# Patient Record
Sex: FEMALE | Race: WHITE | NOT HISPANIC OR LATINO | Employment: OTHER | URBAN - METROPOLITAN AREA
[De-identification: names, ages, dates, MRNs, and addresses within clinical notes are randomized per-mention and may not be internally consistent; named-entity substitution may affect disease eponyms.]

---

## 2019-04-12 DIAGNOSIS — R52 PAIN: ICD-10-CM

## 2019-04-12 LAB
ANION GAP SERPL CALC-SCNC: 11 MEQ/L (ref 3–15)
BUN SERPL-MCNC: 19 MG/DL (ref 8–20)
CALCIUM SERPL-MCNC: 8.9 MG/DL (ref 8.9–10.3)
CHLORIDE SERPL-SCNC: 101 MEQ/L (ref 98–109)
CO2 SERPL-SCNC: 24 MEQ/L (ref 22–32)
CREAT SERPL-MCNC: 0.9 MG/DL
ERYTHROCYTE [DISTWIDTH] IN BLOOD BY AUTOMATED COUNT: 14.7 % (ref 11.7–14.4)
GFR SERPL CREATININE-BSD FRML MDRD: 59.2 ML/MIN/1.73M*2
GLUCOSE SERPL-MCNC: 89 MG/DL (ref 70–99)
HCT VFR BLDCO AUTO: 36.7 %
HGB BLD-MCNC: 11.7 G/DL
MCH RBC QN AUTO: 29.6 PG (ref 28–33.2)
MCHC RBC AUTO-ENTMCNC: 31.9 G/DL (ref 32.2–35.5)
MCV RBC AUTO: 92.9 FL (ref 83–98)
PDW BLD AUTO: 9.7 FL (ref 9.4–12.3)
PLATELET # BLD AUTO: 389 K/UL
POTASSIUM SERPL-SCNC: 4.8 MEQ/L (ref 3.6–5.1)
RBC # BLD AUTO: 3.95 M/UL (ref 3.93–5.22)
SODIUM SERPL-SCNC: 136 MEQ/L (ref 136–144)
WBC # BLD AUTO: 7.13 K/UL

## 2019-04-12 PROCEDURE — 85027 COMPLETE CBC AUTOMATED: CPT | Performed by: INTERNAL MEDICINE

## 2019-04-12 PROCEDURE — 80048 BASIC METABOLIC PNL TOTAL CA: CPT | Performed by: INTERNAL MEDICINE

## 2019-04-12 PROCEDURE — 36415 COLL VENOUS BLD VENIPUNCTURE: CPT

## 2019-04-18 DIAGNOSIS — I10 ESSENTIAL (PRIMARY) HYPERTENSION: ICD-10-CM

## 2019-04-18 DIAGNOSIS — R35.0 URINARY FREQUENCY: ICD-10-CM

## 2019-04-18 LAB
ANION GAP SERPL CALC-SCNC: 10 MEQ/L (ref 3–15)
BACTERIA URNS QL MICRO: ABNORMAL /HPF
BILIRUB UR QL STRIP.AUTO: NEGATIVE MG/DL
BUN SERPL-MCNC: 20 MG/DL (ref 8–20)
CALCIUM SERPL-MCNC: 8.9 MG/DL (ref 8.9–10.3)
CAOX CRY URNS QL MICRO: 2 /HPF
CHLORIDE SERPL-SCNC: 103 MEQ/L (ref 98–109)
CLARITY UR REFRACT.AUTO: ABNORMAL
CO2 SERPL-SCNC: 23 MEQ/L (ref 22–32)
COLOR UR AUTO: YELLOW
CREAT SERPL-MCNC: 0.8 MG/DL
ERYTHROCYTE [DISTWIDTH] IN BLOOD BY AUTOMATED COUNT: 14.6 % (ref 11.7–14.4)
GFR SERPL CREATININE-BSD FRML MDRD: >60 ML/MIN/1.73M*2
GLUCOSE SERPL-MCNC: 92 MG/DL (ref 70–99)
GLUCOSE UR STRIP.AUTO-MCNC: NEGATIVE MG/DL
HCT VFR BLDCO AUTO: 37.1 %
HGB BLD-MCNC: 12.4 G/DL
HGB UR QL STRIP.AUTO: 3
HYALINE CASTS #/AREA URNS LPF: ABNORMAL /LPF
KETONES UR STRIP.AUTO-MCNC: NEGATIVE MG/DL
LEUKOCYTE ESTERASE UR QL STRIP.AUTO: 2
MCH RBC QN AUTO: 29.8 PG (ref 28–33.2)
MCHC RBC AUTO-ENTMCNC: 33.4 G/DL (ref 32.2–35.5)
MCV RBC AUTO: 89.2 FL (ref 83–98)
MUCOUS THREADS URNS QL MICRO: 2 /LPF
NITRITE UR QL STRIP.AUTO: NEGATIVE
PDW BLD AUTO: 10.1 FL (ref 9.4–12.3)
PH UR STRIP.AUTO: 6 [PH]
PLATELET # BLD AUTO: 370 K/UL
POTASSIUM SERPL-SCNC: 4 MEQ/L (ref 3.6–5.1)
PROT UR QL STRIP.AUTO: 1
RBC # BLD AUTO: 4.16 M/UL (ref 3.93–5.22)
RBC #/AREA URNS HPF: ABNORMAL /HPF
SODIUM SERPL-SCNC: 136 MEQ/L (ref 136–144)
SP GR UR REFRACT.AUTO: 1.02
SQUAMOUS URNS QL MICRO: ABNORMAL /HPF
UROBILINOGEN UR STRIP-ACNC: 1 EU/DL
WBC # BLD AUTO: 6.07 K/UL
WBC #/AREA URNS HPF: ABNORMAL /HPF

## 2019-04-18 PROCEDURE — 85027 COMPLETE CBC AUTOMATED: CPT | Performed by: INTERNAL MEDICINE

## 2019-04-18 PROCEDURE — 36415 COLL VENOUS BLD VENIPUNCTURE: CPT | Performed by: INTERNAL MEDICINE

## 2019-04-18 PROCEDURE — 80048 BASIC METABOLIC PNL TOTAL CA: CPT | Mod: 59 | Performed by: INTERNAL MEDICINE

## 2019-04-18 PROCEDURE — 81003 URINALYSIS AUTO W/O SCOPE: CPT | Performed by: INTERNAL MEDICINE

## 2019-04-18 PROCEDURE — 87086 URINE CULTURE/COLONY COUNT: CPT | Performed by: INTERNAL MEDICINE

## 2021-04-14 DIAGNOSIS — Z23 ENCOUNTER FOR IMMUNIZATION: ICD-10-CM

## 2024-02-29 ENCOUNTER — APPOINTMENT (EMERGENCY)
Dept: RADIOLOGY | Facility: HOSPITAL | Age: 89
DRG: 536 | End: 2024-02-29
Payer: COMMERCIAL

## 2024-02-29 ENCOUNTER — APPOINTMENT (OUTPATIENT)
Dept: RADIOLOGY | Facility: HOSPITAL | Age: 89
Setting detail: OBSERVATION
DRG: 536 | End: 2024-02-29
Attending: SPEECH-LANGUAGE PATHOLOGIST
Payer: COMMERCIAL

## 2024-02-29 ENCOUNTER — HOSPITAL ENCOUNTER (INPATIENT)
Facility: HOSPITAL | Age: 89
LOS: 3 days | Discharge: NURSING, REHAB OR LONG-TERM CARE | DRG: 536 | End: 2024-03-04
Attending: EMERGENCY MEDICINE | Admitting: STUDENT IN AN ORGANIZED HEALTH CARE EDUCATION/TRAINING PROGRAM
Payer: COMMERCIAL

## 2024-02-29 DIAGNOSIS — R55 SYNCOPE AND COLLAPSE: Primary | ICD-10-CM
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
## 2024-02-29 DIAGNOSIS — S32.591A CLOSED FRACTURE OF RAMUS OF RIGHT PUBIS, INITIAL ENCOUNTER (CMS/HCC): ICD-10-CM

## 2024-02-29 PROBLEM — E03.9 HYPOTHYROID: Status: ACTIVE | Noted: 2024-02-29

## 2024-02-29 PROBLEM — S32.511A: Status: ACTIVE | Noted: 2024-02-29

## 2024-02-29 PROBLEM — D72.829 LEUKOCYTOSIS: Status: ACTIVE | Noted: 2024-02-29

## 2024-02-29 PROBLEM — I10 HTN (HYPERTENSION): Status: ACTIVE | Noted: 2024-02-29

## 2024-02-29 LAB
ALBUMIN SERPL-MCNC: 4.2 G/DL (ref 3.5–5.7)
ALP SERPL-CCNC: 77 IU/L (ref 34–125)
ALT SERPL-CCNC: 14 IU/L (ref 7–52)
ANION GAP SERPL CALC-SCNC: 10 MEQ/L (ref 3–15)
AST SERPL-CCNC: 16 IU/L (ref 13–39)
BACTERIA URNS QL MICRO: ABNORMAL /HPF
BASOPHILS # BLD: 0.05 K/UL (ref 0.01–0.1)
BASOPHILS NFR BLD: 0.4 %
BILIRUB SERPL-MCNC: 1.1 MG/DL (ref 0.3–1.2)
BILIRUB UR QL STRIP.AUTO: NEGATIVE MG/DL
BUN SERPL-MCNC: 27 MG/DL (ref 7–25)
CALCIUM SERPL-MCNC: 9.3 MG/DL (ref 8.6–10.3)
CHLORIDE SERPL-SCNC: 103 MEQ/L (ref 98–107)
CLARITY UR REFRACT.AUTO: CLEAR
CO2 SERPL-SCNC: 26 MEQ/L (ref 21–31)
COLOR UR AUTO: YELLOW
CREAT SERPL-MCNC: 0.8 MG/DL (ref 0.6–1.2)
DIFFERENTIAL METHOD BLD: ABNORMAL
EGFRCR SERPLBLD CKD-EPI 2021: >60 ML/MIN/1.73M*2
EOSINOPHIL # BLD: 0.09 K/UL (ref 0.04–0.36)
EOSINOPHIL NFR BLD: 0.8 %
ERYTHROCYTE [DISTWIDTH] IN BLOOD BY AUTOMATED COUNT: 14.1 % (ref 11.7–14.4)
GLUCOSE SERPL-MCNC: 111 MG/DL (ref 70–99)
GLUCOSE UR STRIP.AUTO-MCNC: NEGATIVE MG/DL
HCT VFR BLD AUTO: 42.4 % (ref 35–45)
HGB BLD-MCNC: 14 G/DL (ref 11.8–15.7)
HGB UR QL STRIP.AUTO: NEGATIVE
HYALINE CASTS #/AREA URNS LPF: ABNORMAL /LPF
IMM GRANULOCYTES # BLD AUTO: 0.11 K/UL (ref 0–0.08)
IMM GRANULOCYTES NFR BLD AUTO: 0.9 %
INR PPP: 0.9
KETONES UR STRIP.AUTO-MCNC: NEGATIVE MG/DL
LEUKOCYTE ESTERASE UR QL STRIP.AUTO: NEGATIVE
LYMPHOCYTES # BLD: 1.07 K/UL (ref 1.2–3.5)
LYMPHOCYTES NFR BLD: 9 %
MCH RBC QN AUTO: 28.7 PG (ref 28–33.2)
MCHC RBC AUTO-ENTMCNC: 33 G/DL (ref 32.2–35.5)
MCV RBC AUTO: 87.1 FL (ref 83–98)
MONOCYTES # BLD: 0.49 K/UL (ref 0.28–0.8)
MONOCYTES NFR BLD: 4.1 %
MUCOUS THREADS URNS QL MICRO: ABNORMAL /LPF
NEUTROPHILS # BLD: 10.02 K/UL (ref 1.7–7)
NEUTS SEG NFR BLD: 84.8 %
NITRITE UR QL STRIP.AUTO: NEGATIVE
NRBC BLD-RTO: 0 %
PDW BLD AUTO: 10.1 FL (ref 9.4–12.3)
PH UR STRIP.AUTO: 7 [PH]
PLATELET # BLD AUTO: 321 K/UL (ref 150–369)
POTASSIUM SERPL-SCNC: 3.9 MEQ/L (ref 3.5–5.1)
PROT SERPL-MCNC: 7.3 G/DL (ref 6–8.2)
PROT UR QL STRIP.AUTO: ABNORMAL
PROTHROMBIN TIME: 12.1 SEC (ref 12.2–14.5)
RBC # BLD AUTO: 4.87 M/UL (ref 3.93–5.22)
RBC #/AREA URNS HPF: ABNORMAL /HPF
SODIUM SERPL-SCNC: 139 MEQ/L (ref 136–145)
SP GR UR REFRACT.AUTO: 1.02
SQUAMOUS URNS QL MICRO: ABNORMAL /HPF
TROPONIN I SERPL HS-MCNC: 10.7 PG/ML
TROPONIN I SERPL HS-MCNC: 13.7 PG/ML
UROBILINOGEN UR STRIP-ACNC: 0.2 EU/DL
WBC # BLD AUTO: 11.83 K/UL (ref 3.8–10.5)
WBC #/AREA URNS HPF: ABNORMAL /HPF

## 2024-02-29 PROCEDURE — 80053 COMPREHEN METABOLIC PANEL: CPT

## 2024-02-29 PROCEDURE — 71275 CT ANGIOGRAPHY CHEST: CPT | Mod: ME

## 2024-02-29 PROCEDURE — 85025 COMPLETE CBC W/AUTO DIFF WBC: CPT

## 2024-02-29 PROCEDURE — 73502 X-RAY EXAM HIP UNI 2-3 VIEWS: CPT | Mod: RT

## 2024-02-29 PROCEDURE — 36415 COLL VENOUS BLD VENIPUNCTURE: CPT | Performed by: EMERGENCY MEDICINE

## 2024-02-29 PROCEDURE — 63600105 HC IODINE BASED CONTRAST: Mod: JZ | Performed by: SPEECH-LANGUAGE PATHOLOGIST

## 2024-02-29 PROCEDURE — 93005 ELECTROCARDIOGRAM TRACING: CPT

## 2024-02-29 PROCEDURE — G1004 CDSM NDSC: HCPCS

## 2024-02-29 PROCEDURE — 72125 CT NECK SPINE W/O DYE: CPT

## 2024-02-29 PROCEDURE — 85610 PROTHROMBIN TIME: CPT

## 2024-02-29 PROCEDURE — 25800000 HC PHARMACY IV SOLUTIONS

## 2024-02-29 PROCEDURE — G0378 HOSPITAL OBSERVATION PER HR: HCPCS

## 2024-02-29 PROCEDURE — 81001 URINALYSIS AUTO W/SCOPE: CPT

## 2024-02-29 PROCEDURE — 71045 X-RAY EXAM CHEST 1 VIEW: CPT

## 2024-02-29 PROCEDURE — 96361 HYDRATE IV INFUSION ADD-ON: CPT

## 2024-02-29 PROCEDURE — 84484 ASSAY OF TROPONIN QUANT: CPT | Mod: 91

## 2024-02-29 PROCEDURE — 99285 EMERGENCY DEPT VISIT HI MDM: CPT | Mod: 25

## 2024-02-29 PROCEDURE — 72192 CT PELVIS W/O DYE: CPT

## 2024-02-29 PROCEDURE — 70450 CT HEAD/BRAIN W/O DYE: CPT

## 2024-02-29 PROCEDURE — 63600000 HC DRUGS/DETAIL CODE: Mod: JZ

## 2024-02-29 PROCEDURE — 96360 HYDRATION IV INFUSION INIT: CPT | Mod: 59

## 2024-02-29 PROCEDURE — 84484 ASSAY OF TROPONIN QUANT: CPT

## 2024-02-29 PROCEDURE — 99223 1ST HOSP IP/OBS HIGH 75: CPT | Performed by: HOSPITALIST

## 2024-02-29 RX ORDER — AMLODIPINE BESYLATE 2.5 MG/1
2.5 TABLET ORAL DAILY
COMMUNITY
End: 2024-02-29

## 2024-02-29 RX ORDER — POTASSIUM CHLORIDE 750 MG/1
40 TABLET, EXTENDED RELEASE ORAL AS NEEDED
Status: DISCONTINUED | OUTPATIENT
Start: 2024-02-29 | End: 2024-03-04 | Stop reason: HOSPADM

## 2024-02-29 RX ORDER — POTASSIUM CHLORIDE 750 MG/1
20 TABLET, EXTENDED RELEASE ORAL AS NEEDED
Status: DISCONTINUED | OUTPATIENT
Start: 2024-02-29 | End: 2024-02-29

## 2024-02-29 RX ORDER — AMLODIPINE BESYLATE 2.5 MG/1
2.5 TABLET ORAL DAILY
Status: DISCONTINUED | OUTPATIENT
Start: 2024-03-01 | End: 2024-03-04 | Stop reason: HOSPADM

## 2024-02-29 RX ORDER — DEXTROSE 50 % IN WATER (D50W) INTRAVENOUS SYRINGE
25 AS NEEDED
Status: DISCONTINUED | OUTPATIENT
Start: 2024-02-29 | End: 2024-03-04 | Stop reason: HOSPADM

## 2024-02-29 RX ORDER — ONDANSETRON HYDROCHLORIDE 2 MG/ML
4 INJECTION, SOLUTION INTRAVENOUS ONCE
Status: COMPLETED | OUTPATIENT
Start: 2024-02-29 | End: 2024-02-29

## 2024-02-29 RX ORDER — DEXTROSE 40 %
15-30 GEL (GRAM) ORAL AS NEEDED
Status: DISCONTINUED | OUTPATIENT
Start: 2024-02-29 | End: 2024-03-04 | Stop reason: HOSPADM

## 2024-02-29 RX ORDER — MORPHINE SULFATE 2 MG/ML
2 INJECTION, SOLUTION INTRAMUSCULAR; INTRAVENOUS EVERY 4 HOURS PRN
Status: DISCONTINUED | OUTPATIENT
Start: 2024-02-29 | End: 2024-02-29

## 2024-02-29 RX ORDER — PRAVASTATIN SODIUM 20 MG/1
20 TABLET ORAL NIGHTLY
Status: DISCONTINUED | OUTPATIENT
Start: 2024-02-29 | End: 2024-03-04 | Stop reason: HOSPADM

## 2024-02-29 RX ORDER — PRAVASTATIN SODIUM 20 MG/1
20 TABLET ORAL NIGHTLY
COMMUNITY

## 2024-02-29 RX ORDER — POTASSIUM CHLORIDE 14.9 MG/ML
20 INJECTION INTRAVENOUS AS NEEDED
Status: DISCONTINUED | OUTPATIENT
Start: 2024-02-29 | End: 2024-03-04 | Stop reason: HOSPADM

## 2024-02-29 RX ORDER — MORPHINE SULFATE 2 MG/ML
2 INJECTION, SOLUTION INTRAMUSCULAR; INTRAVENOUS
Status: DISCONTINUED | OUTPATIENT
Start: 2024-02-29 | End: 2024-02-29

## 2024-02-29 RX ORDER — LEVOTHYROXINE SODIUM 88 UG/1
88 TABLET ORAL
Status: DISCONTINUED | OUTPATIENT
Start: 2024-03-01 | End: 2024-03-04 | Stop reason: HOSPADM

## 2024-02-29 RX ORDER — LIDOCAINE 560 MG/1
1 PATCH PERCUTANEOUS; TOPICAL; TRANSDERMAL DAILY
Status: DISCONTINUED | OUTPATIENT
Start: 2024-02-29 | End: 2024-03-04 | Stop reason: HOSPADM

## 2024-02-29 RX ORDER — AMOXICILLIN 250 MG
2 CAPSULE ORAL DAILY PRN
COMMUNITY

## 2024-02-29 RX ORDER — AMOXICILLIN 250 MG
2 CAPSULE ORAL DAILY PRN
Status: DISCONTINUED | OUTPATIENT
Start: 2024-02-29 | End: 2024-03-04 | Stop reason: HOSPADM

## 2024-02-29 RX ORDER — IOPAMIDOL 755 MG/ML
100 INJECTION, SOLUTION INTRAVASCULAR
Status: COMPLETED | OUTPATIENT
Start: 2024-02-29 | End: 2024-02-29

## 2024-02-29 RX ORDER — AMLODIPINE BESYLATE 2.5 MG/1
2.5 TABLET ORAL DAILY
COMMUNITY
End: 2024-10-09 | Stop reason: ENTERED-IN-ERROR

## 2024-02-29 RX ORDER — BETHANECHOL CHLORIDE 10 MG/1
10 TABLET ORAL 3 TIMES DAILY
COMMUNITY
End: 2024-10-16 | Stop reason: HOSPADM

## 2024-02-29 RX ORDER — LEVOTHYROXINE SODIUM 88 UG/1
88 TABLET ORAL
COMMUNITY

## 2024-02-29 RX ORDER — MORPHINE SULFATE 4 MG/ML
4 INJECTION, SOLUTION INTRAMUSCULAR; INTRAVENOUS ONCE
Status: COMPLETED | OUTPATIENT
Start: 2024-02-29 | End: 2024-02-29

## 2024-02-29 RX ORDER — FLUTICASONE PROPIONATE 50 MCG
1 SPRAY, SUSPENSION (ML) NASAL DAILY
COMMUNITY

## 2024-02-29 RX ORDER — ACETAMINOPHEN 325 MG/1
650 TABLET ORAL EVERY 6 HOURS
Status: DISCONTINUED | OUTPATIENT
Start: 2024-02-29 | End: 2024-03-04 | Stop reason: HOSPADM

## 2024-02-29 RX ORDER — BETHANECHOL CHLORIDE 10 MG/1
10 TABLET ORAL 3 TIMES DAILY
Status: DISCONTINUED | OUTPATIENT
Start: 2024-02-29 | End: 2024-03-04 | Stop reason: HOSPADM

## 2024-02-29 RX ORDER — MORPHINE SULFATE 4 MG/ML
4 INJECTION, SOLUTION INTRAMUSCULAR; INTRAVENOUS EVERY 4 HOURS PRN
Status: DISCONTINUED | OUTPATIENT
Start: 2024-02-29 | End: 2024-03-01

## 2024-02-29 RX ORDER — IBUPROFEN 200 MG
16-32 TABLET ORAL AS NEEDED
Status: DISCONTINUED | OUTPATIENT
Start: 2024-02-29 | End: 2024-03-04 | Stop reason: HOSPADM

## 2024-02-29 RX ORDER — ACETAMINOPHEN 325 MG/1
650 TABLET ORAL EVERY 6 HOURS
Status: DISCONTINUED | OUTPATIENT
Start: 2024-03-01 | End: 2024-02-29

## 2024-02-29 RX ORDER — DIAZEPAM 2 MG/1
5 TABLET ORAL EVERY 6 HOURS PRN
Status: DISCONTINUED | OUTPATIENT
Start: 2024-02-29 | End: 2024-03-04 | Stop reason: HOSPADM

## 2024-02-29 RX ORDER — NITROGLYCERIN 0.4 MG/1
0.4 TABLET SUBLINGUAL EVERY 5 MIN PRN
Status: DISCONTINUED | OUTPATIENT
Start: 2024-02-29 | End: 2024-03-04 | Stop reason: HOSPADM

## 2024-02-29 RX ADMIN — MORPHINE SULFATE 4 MG: 4 INJECTION, SOLUTION INTRAMUSCULAR; INTRAVENOUS at 16:47

## 2024-02-29 RX ADMIN — ONDANSETRON 4 MG: 2 INJECTION INTRAMUSCULAR; INTRAVENOUS at 16:47

## 2024-02-29 RX ADMIN — IOPAMIDOL 100 ML: 755 INJECTION, SOLUTION INTRAVENOUS at 21:03

## 2024-02-29 RX ADMIN — SODIUM CHLORIDE 500 ML: 9 INJECTION, SOLUTION INTRAVENOUS at 16:08

## 2024-02-29 NOTE — ED ATTESTATION NOTE
I have personally seen and examined the patient.  I have performed the key components of the encounter and provided a substantive portion of the care and medical decision making for the patient.     I reviewed and agree with resident / physician assistant / nurse practitioner’s assessment and plan of care, with any exceptions as documented below.     My focused history, examination, assessment, and plan of care of  Sofie Stewart is as follows:       Vital Signs Review: Vital signs have been reviewed. The oxygen saturation is  SpO2: 97 % which is normal.    The patient is a 89-year-old female with past medical history of ambulatory dysfunction, hard of hearing, who presented to the emergency department for evaluation of fall.  The patient was reported to have a syncopal episode today falling striking the left side of head.  The patient complains of left hip pain.  The patient normally uses a cane to ambulate.  Patient denies neck pain, chest pain, dyspnea, nausea, vomiting, new focal neurologic weakness or numbness.  Patient was unable to bear weight due to pain in the left hip.    Physical exam: Vital signs reviewed.  General: Patient appears comfortable sitting up in bed.  HEENT: Approximately 5 mm superficial laceration to the left frontal scalp.  No active bleeding.  No foreign body seen.  PERRLA, EOMI, MMM.  Neck: C-collar in place.  No focal bony tenderness.  Chest: Lungs clear to auscultation bilaterally, no rales.  Heart: Regular rate and rhythm, no murmurs.  Abdomen: Soft, nontender, no guarding, no rebound.  Extremities: No cyanosis, clubbing, edema, or deformity .  The patient has moderate tenderness to palpation over the lateral aspect of the right hip.  No palpable hematoma.  Pelvis is stable.  Patient is severe limitation range of motion of the right lower extremity due to pain at the right hip.  Patient neurovascular intact distally in the right lower extremity with 2+ DP pulses present.  Skin: Warm and  dry, no rash.  Neuro: GCS 15.  Affect: Normal.    Plan: Check labs, x-rays.  Rule out traumatic injuries.  CT scan rule out subdural.      Labs Reviewed   CBC AND DIFF - Abnormal       Result Value    WBC 11.83 (*)     RBC 4.87      Hemoglobin 14.0      Hematocrit 42.4      MCV 87.1      MCH 28.7      MCHC 33.0      RDW 14.1      Platelets 321      MPV 10.1      Differential Type Auto      nRBC 0.0      Immature Granulocytes 0.9      Neutrophils 84.8      Lymphocytes 9.0      Monocytes 4.1      Eosinophils 0.8      Basophils 0.4      Immature Granulocytes, Absolute 0.11 (*)     Neutrophils, Absolute 10.02 (*)     Lymphocytes, Absolute 1.07 (*)     Monocytes, Absolute 0.49      Eosinophils, Absolute 0.09      Basophils, Absolute 0.05     COMPREHENSIVE METABOLIC PANEL - Abnormal    Sodium 139      Potassium 3.9      Chloride 103      CO2 26      BUN 27 (*)     Creatinine 0.8      Glucose 111 (*)     Calcium 9.3      AST (SGOT) 16      ALT (SGPT) 14      Alkaline Phosphatase 77      Total Protein 7.3      Albumin 4.2      Bilirubin, Total 1.1      eGFR >60.0      Anion Gap 10     PROTIME-INR - Abnormal    PT 12.1 (*)     INR 0.9     HIGH SENSITIVE TROPONIN I (BASELINE - REFLEX 2HR) - Normal    High Sens Troponin I 10.7     URINALYSIS REFLEX CULTURE (ED AND OUTPATIENT ONLY)    Narrative:     The following orders were created for panel order UA w/ reflex culture (ED Only).  Procedure                               Abnormality         Status                     ---------                               -----------         ------                     UA Reflex to Culture (Ma...[[id number]]                                                   Please view results for these tests on the individual orders.   UA REFLEX CULTURE (MACROSCOPIC)   RAINBOW DRAW PANEL    Narrative:     The following orders were created for panel order [place] Draw Panel.  Procedure                               Abnormality         Status                      ---------                               -----------         ------                     RAINBOW RED[[id number]]                                      In process                 RAINBOW PINK[[id number]]                                     In process                 RAINBOW GOLD[[id number]]                                     In process                   Please view results for these tests on the individual orders.   HIGH SENSITIVE TROPONIN I (NO REFLEX)   RAINBOW RED   RAINBOW PINK   RAINBOW GOLD     X-RAY CHEST 1 VIEW   Final Result   IMPRESSION:         1. 9 mm nodular density right midlung.   2. Patchy opacities in the right upper lung which may be due to infection.   2. Further evaluation with CT chest is recommended.      X-RAY HIP WITH OR WITHOUT PELVIS 2-3 VW RIGHT   Final Result   IMPRESSION:   Possible nondisplaced fracture right superior pubic ramus. This could be better   evaluated with CT.               CT HEAD WITHOUT IV CONTRAST   Final Result   IMPRESSION:      1. No posttraumatic intracranial abnormality.   2. No acute cervical spine fractures.  As clinically indicated, MRI of the   cervical spine is recommended for further evaluation.   3. Other incidental findings noted under the comment.         CT CERVICAL SPINE WITHOUT IV CONTRAST   Final Result   IMPRESSION:      1. No posttraumatic intracranial abnormality.   2. No acute cervical spine fractures.  As clinically indicated, MRI of the   cervical spine is recommended for further evaluation.   3. Other incidental findings noted under the comment.         ECG 12 lead    (Results Pending)   CT PELVIS WITHOUT IV CONTRAST    (Results Pending)     ECG 12 lead   ED Interpretation   Rhythm: Normal sinus rhythm with PVCs  Rate: 99  P waves: 154 ms  QRS: 74 ms  Axis: 61 16 67  ST Segments: [no obvious ST elevation or ischemia]     Nonspecific T wave abnormality, abnormal EKG, no previous EKG for comparison.        CT PELVIS WITHOUT IV CONTRAST   Final Result    IMPRESSION:      Acute nondisplaced right superior pubic ramus fracture with associated right   pelvic sidewall/extraperitoneal hemorrhage.      X-RAY CHEST 1 VIEW   Final Result   IMPRESSION:         1. 9 mm nodular density right midlung.   2. Patchy opacities in the right upper lung which may be due to infection.   2. Further evaluation with CT chest is recommended.      X-RAY HIP WITH OR WITHOUT PELVIS 2-3 VW RIGHT   Final Result   IMPRESSION:   Possible nondisplaced fracture right superior pubic ramus. This could be better   evaluated with CT.               CT HEAD WITHOUT IV CONTRAST   Final Result   IMPRESSION:      1. No posttraumatic intracranial abnormality.   2. No acute cervical spine fractures.  As clinically indicated, MRI of the   cervical spine is recommended for further evaluation.   3. Other incidental findings noted under the comment.         CT CERVICAL SPINE WITHOUT IV CONTRAST   Final Result   IMPRESSION:      1. No posttraumatic intracranial abnormality.   2. No acute cervical spine fractures.  As clinically indicated, MRI of the   cervical spine is recommended for further evaluation.   3. Other incidental findings noted under the comment.         CT ANGIOGRAPHY CHEST PULMONARY EMBOLISM WITH IV CONTRAST    (Results Pending)   Transthoracic echo (TTE) complete    (Results Pending)         MDM: Patient test results reviewed.  Patient has persistent pain with any range of motion testing of the right lower extremity.  Plan hospitalize patient.  Acute syncopal episode.  CT scan results pending of the right hip and pelvis.    The patient CT scan results of the pelvis are reviewed.  The patient is hemodynamically stable.  No hypotension.  No hypoxia.  HMS to admit patient.    Impression: Acute fall.  Acute right hip pain.  Acute syncopal episode.  Acute right superior pubic ramus fracture.     I was physically present for the key/critical portions of the following procedures: None    This document  was created using Dragon dictation software.  There may be some typographical errors due to this technology.     Iván Lazo MD  02/29/24 1320

## 2024-02-29 NOTE — ED PROVIDER NOTES
HPI    HISTORY OF PRESENT ILLNESS     HPI     89-year-old female with past medical history significant for hypertension, hyponatremia, arrives to the emergency department via EMS from the [facility] after sustaining a fall while ambulating with her walker in the facility.  Patient is awake alert and answering questions appropriately, and is hard of hearing at baseline.  Patient denies mechanical fall, endorses syncope.  Patient denies dizziness preceding syncopal event, states she was ambulating with her walker and then she remembers waking up on the floor with staff at her side.  Patient is endorsing left hip pain.  Small abrasion is noted to left side of her forehead, no repairable injury.  Patient rates her pain 5 out of 10, with external rotation and shortening noted.  Neurovascularly intact.  Denies headache, dizziness, fevers, recent illness, chest pain, shortness of breath, abdominal pain, nausea, vomiting, diarrhea, constipation, numbness, tingling.      Patient History   PAST HISTORY     Reviewed from Nursing Triage:       No past medical history on file.    No past surgical history on file.    No family history on file.           Review of Systems   REVIEW OF SYSTEMS     Review of Systems      VITALS     ED Vitals    Date/Time Temp Pulse Resp BP SpO2 [facility]   02/29/24 1408 36.4 °C (97.6 °F) 73 16 176/79 97 % FFS                       Physical Exam   PHYSICAL EXAM     Physical Exam  Constitutional:       Appearance: Normal appearance. She is normal weight. She is not ill-appearing or toxic-appearing.   HENT:      Head: Normocephalic.   Eyes:      Extraocular Movements: Extraocular movements intact.      Pupils: Pupils are equal, round, and reactive to light.   Cardiovascular:      Rate and Rhythm: Normal rate and regular rhythm.      Pulses: Normal pulses.      Heart sounds: Normal heart sounds.      No S3 or S4 sounds.   Pulmonary:      Effort: Pulmonary effort is normal.       Breath sounds: Examination of the right-upper field reveals decreased breath sounds. Examination of the left-upper field reveals decreased breath sounds. Decreased breath sounds present.   Abdominal:      Palpations: Abdomen is soft.      Tenderness: There is no abdominal tenderness. There is no guarding.   Musculoskeletal:      Cervical back: Normal range of motion and neck supple. No rigidity or tenderness.      Right hip: Tenderness and bony tenderness present. No deformity. Decreased range of motion. Decreased strength.      Right lower leg: No edema.      Left lower leg: No edema.   Skin:     General: Skin is warm and dry.      Capillary Refill: Capillary refill takes less than 2 seconds.   Neurological:      General: No focal deficit present.      Mental Status: She is alert and oriented to person, place, and time.           PROCEDURES     Procedures     DATA     Results     None          Imaging Results          CT HEAD WITHOUT IV CONTRAST (In process)                CT CERVICAL SPINE WITHOUT IV CONTRAST (In process)                 ECG 12 lead    (Results Pending)       Scoring tools                                  ED Course & MDM   MDM / ED COURSE / CLINICAL IMPRESSION / DISPO   Vital Signs Review: Vital signs have been reviewed.   The oxygen saturation is SpO2: 97 % which is normal.    Medical Decision Making  Chief complaint: Syncope, right hip pain.    Impression: 89-year-old nontoxic appearing female patient presents via EMS with symptoms consistent with syncope, with unknown etiology.  Presentation not consistent with seizures given short time course, no postictal state, no seizure activity or history of seizure.  Low suspicion for PE, thoracic aortic dissection, AAA rupture.  Presentation not consistent with acute life-threatening arrhythmia, EKG reassuring, heart score 4.  Right lower extremity is notably shortened with external rotation secondary to fall.  Given age, cardiovascular risk  factors, history and physical, will workup and admit to hospitalist service.    Plan: CBC, CMP, troponin, EKG, chest x-ray, CT head and C-spine, plain films pelvis and right hip, IV fluids, pain management, cardiac monitoring    Dispo: Admit to [facility]    Closed fracture of ramus of right pubis, initial encounter (CMS/[facility]): acute illness or injury  Fall, initial encounter: acute illness or injury  Syncope and collapse: acute illness or injury  Amount and/or Complexity of Data Reviewed  External Data Reviewed: labs and notes.  Labs: ordered. Decision-making details documented in ED Course.  Radiology: ordered. Decision-making details documented in ED Course.  ECG/medicine tests: ordered and independent interpretation performed. Decision-making details documented in ED Course.      Risk  Prescription drug management.  Decision regarding hospitalization.            ED Course as of 03/03/24 1700   Thu Feb 29, 2024   1650 Negative x-ray findings, concern for occult fracture, order placed for CT pelvis without contrast. [JM]   1743 Rhythm: Normal sinus rhythm with PVCs  Rate: 99  P waves: 154 ms  QRS: 74 ms  Axis: 61 16 67  ST Segments: [no obvious ST elevation or ischemia]     Nonspecific T wave abnormality, abnormal EKG, no previous EKG for comparison. [JM]   1821 CLINICAL HISTORY:    Rule out occult hip fracture     COMPARISON:    None        COMMENT:  Technique: CT of the pelvis without contrast:     CT DOSE:  One or more dose reduction techniques (e.g. automated exposure  control, adjustment of the mA and/or kV according to patient size, use of  iterative reconstruction technique) utilized for this examination.        COMMENT:     Bones: Osteopenia. Acute nondisplaced right superior pubic ramus fracture.  Joints: No osseous malalignment. Severe osteoarthritis of the right hip. Mild  left hip osteoarthritis  Soft Tissues: There is right extra peritoneal hematoma noted in the space of  Retzius/right pelvic sidewall. A  right adnexal cyst measuring 3.8 cm     --  IMPRESSION:     Acute nondisplaced right superior pubic ramus fracture with associated right  pelvic sidewall/extraperitoneal hemorrhage. [JM]   1921 Ortho made aware of close nondisplaced fracture of pubis ramus.  Case discussed with Dr. Harrison. Reviewed patient's presentation, ED course, and relevant data. Hospitalist accepts patient on their service and will see/admit.   [JM]      ED Course User Index  [JM] Radha Casey CRNP     Clinical Impression      None   Syncope and collapse  Fall initial encounter  Closed fracture of ramus of right pubis, initial encounter.           This document was created using Dragon dictation software.  There might be some typographical errors due to this technology.    We are in a period of time with increased volumes and decreased capacity.      Radha Casey CRNP  03/03/24 0414

## 2024-03-01 ENCOUNTER — APPOINTMENT (OUTPATIENT)
Dept: CARDIOLOGY | Facility: HOSPITAL | Age: 89
Setting detail: OBSERVATION
DRG: 536 | End: 2024-03-01
Attending: SPEECH-LANGUAGE PATHOLOGIST
Payer: COMMERCIAL

## 2024-03-01 PROBLEM — R55 SYNCOPE AND COLLAPSE: Status: ACTIVE | Noted: 2024-03-01

## 2024-03-01 LAB
ANION GAP SERPL CALC-SCNC: 8 MEQ/L (ref 3–15)
AORTIC ROOT ANNULUS: 2.8 CM
ASCENDING AORTA: 3.3 CM
ATRIAL RATE: 93
BSA FOR ECHO PROCEDURE: 1.81 M2
BUN SERPL-MCNC: 21 MG/DL (ref 7–25)
CALCIUM SERPL-MCNC: 9.2 MG/DL (ref 8.6–10.3)
CHLORIDE SERPL-SCNC: 103 MEQ/L (ref 98–107)
CO2 SERPL-SCNC: 27 MEQ/L (ref 21–31)
CREAT SERPL-MCNC: 0.7 MG/DL (ref 0.6–1.2)
E WAVE DECELERATION TIME: 230 MS
E/A RATIO: 0.8
E/E' RATIO: 15.8
E/LAT E' RATIO: 11.5
EDV (BP): 47.6 CM3
EF (A4C): 72.2 %
EF A2C: 76.4 %
EGFRCR SERPLBLD CKD-EPI 2021: >60 ML/MIN/1.73M*2
EJECTION FRACTION: 75 %
ERYTHROCYTE [DISTWIDTH] IN BLOOD BY AUTOMATED COUNT: 13.9 % (ref 11.7–14.4)
ESV (BP): 11.9 CM3
FRACTIONAL SHORTENING: 34.23 %
GLUCOSE SERPL-MCNC: 126 MG/DL (ref 70–99)
HCT VFR BLD AUTO: 38.5 % (ref 35–45)
HGB BLD-MCNC: 12.8 G/DL (ref 11.8–15.7)
INTERVENTRICULAR SEPTUM: 1.27 CM
LA ESV (BP): 45.6 CM3
LA ESV INDEX (A2C): 24.53 CM3/M2
LA ESV INDEX (BP): 25.19 CM3/M2
LA/AORTA RATIO: 1.04
LAAS-AP2: 18.5 CM2
LAAS-AP4: 19 CM2
LAD 2D: 2.9 CM
LALD A4C: 6.2 CM
LALD A4C: 6.42 CM
LAV-S: 44.4 CM3
LEFT ATRIUM VOLUME INDEX: 25.08 CM3/M2
LEFT ATRIUM VOLUME: 45.4 CM3
LEFT INTERNAL DIMENSION IN SYSTOLE: 2.19 CM (ref 2.44–3.69)
LEFT VENTRICLE DIASTOLIC VOLUME INDEX: 25.41 CM3/M2
LEFT VENTRICLE DIASTOLIC VOLUME: 46 CM3
LEFT VENTRICLE SYSTOLIC VOLUME INDEX: 7.07 CM3/M2
LEFT VENTRICLE SYSTOLIC VOLUME: 12.8 CM3
LEFT VENTRICULAR INTERNAL DIMENSION IN DIASTOLE: 3.33 CM (ref 4.11–5.7)
LEFT VENTRICULAR POSTERIOR WALL IN END DIASTOLE: 0.88 CM (ref 0.54–1)
LV DIASTOLIC VOLUME: 45.3 CM3
LV ESV (APICAL 2 CHAMBER): 10.7 CM3
LVAD-AP2: 19.1 CM2
LVAD-AP4: 20.1 CM2
LVAS-AP2: 7.65 CM2
LVAS-AP4: 9.06 CM2
LVEDVI(A2C): 25.03 CM3/M2
LVEDVI(BP): 26.3 CM3/M2
LVESVI(A2C): 5.91 CM3/M2
LVESVI(BP): 6.57 CM3/M2
LVLD-AP2: 6.58 CM
LVLD-AP4: 7.21 CM
LVLS-AP2: 5.16 CM
LVLS-AP4: 5.55 CM
LVOT 2D: 2.1 CM
LVOT A: 3.46 CM2
MCH RBC QN AUTO: 28.7 PG (ref 28–33.2)
MCHC RBC AUTO-ENTMCNC: 33.2 G/DL (ref 32.2–35.5)
MCV RBC AUTO: 86.3 FL (ref 83–98)
MV E'TISSUE VEL-LAT: 0.07 M/S
MV E'TISSUE VEL-MED: 0.05 M/S
MV PEAK A VEL: 0.95 M/S
MV PEAK E VEL: 0.78 M/S
MV STENOSIS PRESSURE HALF TIME: 67 MS
MV VALVE AREA P 1/2 METHOD: 3.28 CM2
P AXIS: 78
PDW BLD AUTO: 10.4 FL (ref 9.4–12.3)
PLATELET # BLD AUTO: 311 K/UL (ref 150–369)
POSTERIOR WALL: 0.88 CM
POTASSIUM SERPL-SCNC: 3.8 MEQ/L (ref 3.5–5.1)
PR INTERVAL: 148
QRS DURATION: 78
QT INTERVAL: 350
QTC CALCULATION(BAZETT): 435
R AXIS: 19
RBC # BLD AUTO: 4.46 M/UL (ref 3.93–5.22)
SODIUM SERPL-SCNC: 138 MEQ/L (ref 136–145)
T WAVE AXIS: 65
TAPSE: 1.59 CM
TSH SERPL DL<=0.05 MIU/L-ACNC: 0.92 MIU/L (ref 0.34–5.6)
VENTRICULAR RATE: 93
WBC # BLD AUTO: 10.1 K/UL (ref 3.8–10.5)
Z-SCORE OF LEFT VENTRICULAR DIMENSION IN END DIASTOLE: -3.74
Z-SCORE OF LEFT VENTRICULAR DIMENSION IN END SYSTOLE: -2.48
Z-SCORE OF LEFT VENTRICULAR POSTERIOR WALL IN END DIASTOLE: 0.95

## 2024-03-01 PROCEDURE — 36415 COLL VENOUS BLD VENIPUNCTURE: CPT | Performed by: SPEECH-LANGUAGE PATHOLOGIST

## 2024-03-01 PROCEDURE — 97162 PT EVAL MOD COMPLEX 30 MIN: CPT | Mod: GP

## 2024-03-01 PROCEDURE — 97530 THERAPEUTIC ACTIVITIES: CPT | Mod: GP

## 2024-03-01 PROCEDURE — G0378 HOSPITAL OBSERVATION PER HR: HCPCS

## 2024-03-01 PROCEDURE — 97166 OT EVAL MOD COMPLEX 45 MIN: CPT | Mod: GO

## 2024-03-01 PROCEDURE — 63700000 HC SELF-ADMINISTRABLE DRUG: Performed by: HOSPITALIST

## 2024-03-01 PROCEDURE — 93005 ELECTROCARDIOGRAM TRACING: CPT | Performed by: SPEECH-LANGUAGE PATHOLOGIST

## 2024-03-01 PROCEDURE — 97535 SELF CARE MNGMENT TRAINING: CPT | Mod: GO

## 2024-03-01 PROCEDURE — 80048 BASIC METABOLIC PNL TOTAL CA: CPT | Performed by: SPEECH-LANGUAGE PATHOLOGIST

## 2024-03-01 PROCEDURE — 63700000 HC SELF-ADMINISTRABLE DRUG: Performed by: STUDENT IN AN ORGANIZED HEALTH CARE EDUCATION/TRAINING PROGRAM

## 2024-03-01 PROCEDURE — 63700000 HC SELF-ADMINISTRABLE DRUG: Performed by: SPEECH-LANGUAGE PATHOLOGIST

## 2024-03-01 PROCEDURE — 85027 COMPLETE CBC AUTOMATED: CPT | Performed by: SPEECH-LANGUAGE PATHOLOGIST

## 2024-03-01 PROCEDURE — 84443 ASSAY THYROID STIM HORMONE: CPT | Performed by: INTERNAL MEDICINE

## 2024-03-01 PROCEDURE — 93306 TTE W/DOPPLER COMPLETE: CPT

## 2024-03-01 PROCEDURE — 99233 SBSQ HOSP IP/OBS HIGH 50: CPT | Performed by: STUDENT IN AN ORGANIZED HEALTH CARE EDUCATION/TRAINING PROGRAM

## 2024-03-01 PROCEDURE — 21400000 HC ROOM AND CARE CCU/INTERMEDIATE

## 2024-03-01 RX ORDER — TRAMADOL HYDROCHLORIDE 50 MG/1
25 TABLET ORAL EVERY 6 HOURS PRN
Status: DISCONTINUED | OUTPATIENT
Start: 2024-03-01 | End: 2024-03-04 | Stop reason: HOSPADM

## 2024-03-01 RX ADMIN — LIDOCAINE 1 PATCH: 4 PATCH TOPICAL at 08:53

## 2024-03-01 RX ADMIN — AMLODIPINE BESYLATE 2.5 MG: 2.5 TABLET ORAL at 08:49

## 2024-03-01 RX ADMIN — ACETAMINOPHEN 650 MG: 325 TABLET, FILM COATED ORAL at 00:16

## 2024-03-01 RX ADMIN — ACETAMINOPHEN 650 MG: 325 TABLET, FILM COATED ORAL at 13:36

## 2024-03-01 RX ADMIN — PRAVASTATIN SODIUM 20 MG: 20 TABLET ORAL at 22:58

## 2024-03-01 RX ADMIN — TRAMADOL HYDROCHLORIDE 25 MG: 50 TABLET, COATED ORAL at 15:34

## 2024-03-01 RX ADMIN — POTASSIUM CHLORIDE 20 MEQ: 750 TABLET, EXTENDED RELEASE ORAL at 08:49

## 2024-03-01 RX ADMIN — ACETAMINOPHEN 650 MG: 325 TABLET, FILM COATED ORAL at 17:51

## 2024-03-01 RX ADMIN — LEVOTHYROXINE SODIUM 88 MCG: 0.09 TABLET ORAL at 06:28

## 2024-03-01 RX ADMIN — ACETAMINOPHEN 650 MG: 325 TABLET, FILM COATED ORAL at 06:28

## 2024-03-01 RX ADMIN — ACETAMINOPHEN 650 MG: 325 TABLET, FILM COATED ORAL at 22:58

## 2024-03-01 RX ADMIN — PRAVASTATIN SODIUM 20 MG: 20 TABLET ORAL at 00:17

## 2024-03-01 ASSESSMENT — COGNITIVE AND FUNCTIONAL STATUS - GENERAL
MOVING TO AND FROM BED TO CHAIR: 2 - A LOT
CLIMB 3 TO 5 STEPS WITH RAILING: 1 - TOTAL
WALKING IN HOSPITAL ROOM: 2 - A LOT
WALKING IN HOSPITAL ROOM: 2 - A LOT
MOVING TO AND FROM BED TO CHAIR: 2 - A LOT
HELP NEEDED FOR PERSONAL GROOMING: 3 - A LITTLE
STANDING UP FROM CHAIR USING ARMS: 2 - A LOT
EATING MEALS: 3 - A LITTLE
DRESSING REGULAR UPPER BODY CLOTHING: 3 - A LITTLE
STANDING UP FROM CHAIR USING ARMS: 2 - A LOT
DRESSING REGULAR LOWER BODY CLOTHING: 1 - TOTAL
HELP NEEDED FOR BATHING: 2 - A LOT
MOVING TO AND FROM BED TO CHAIR: 2 - A LOT
STANDING UP FROM CHAIR USING ARMS: 2 - A LOT
WALKING IN HOSPITAL ROOM: 1 - TOTAL
TOILETING: 2 - A LOT
AFFECT: WFL;CONFUSED
WALKING IN HOSPITAL ROOM: 1 - TOTAL
CLIMB 3 TO 5 STEPS WITH RAILING: 1 - TOTAL
MOVING TO AND FROM BED TO CHAIR: 2 - A LOT
AFFECT: WFL
CLIMB 3 TO 5 STEPS WITH RAILING: 1 - TOTAL
STANDING UP FROM CHAIR USING ARMS: 2 - A LOT
CLIMB 3 TO 5 STEPS WITH RAILING: 1 - TOTAL

## 2024-03-01 NOTE — H&P
Hospital Medicine Service -  History & Physical        CHIEF COMPLAINT   Fall with R hip pain     HISTORY OF PRESENT ILLNESS      Sofie Stewart is a 89 y.o. female with a past medical history of HTN, hyponatremia, hypothyroid, HLD who presents following a fall earlier today.  Patient reports loss of consciousness and hitting head.  She lives at assisted living facility at [place]; caregivers reported mechanical fall.  Patient has limited memory of events surrounding fall, but denies dizziness or lightheadedness prior to the fall.  She denies any numbness or tingling in her lower extremities, denies any other recent falls.  States pain level is currently 7/10 in right hip.  She denies recent fever/chills, chest pain/palpitations, shortness of breath, cough, abdominal pain, N/V/D, dysuria.    ED course  Vital signs: Heart rate 90s to low 100s, all other vital signs stable  Labs: K3.9, WBC 11.83, troponin 10.7--> 13.7  Imaging: CT pelvis shows acute nondisplaced right superior pubic ramus fracture with right sidewall/extraperitoneal hemorrhage.,  CT head negative, CT C-spine negative, chest x-ray shows 9 mm nodular density right midlung, patchy opacities in the right upper lung.  MAR given 500 cc bolus, Zofran, morphine 4 mg IV    Surrogate decision maker is Raymond son  Patient clearly states she wishes to be a DNR    PAST MEDICAL AND SURGICAL HISTORY      History reviewed. No pertinent past medical history.    History reviewed. No pertinent surgical history.    PCP: Elba Patiño NP    MEDICATIONS      Prior to Admission medications    Medication Sig Start Date End Date Taking? Authorizing Provider   amLODIPine (NORVASC) 2.5 mg tablet Take 2.5 mg by mouth daily.   Yes Provider, Brisa Daniel MD   bethanechol (URECHOLINE) 10 mg tablet Take 10 mg by mouth 3 (three) times a day.   Yes ProviderBrisa MD   fluticasone propionate (FLONASE) 50 mcg/actuation nasal spray Administer 1 spray into each nostril  daily.   Yes ProviderBrisa MD   levothyroxine (SYNTHROID) 88 mcg tablet Take 88 mcg by mouth daily.   Yes Brisa Rodriguez MD   pravastatin (PRAVACHOL) 20 mg tablet Take 20 mg by mouth nightly.   Yes Brisa Rodriguez MD   sennosides-docusate sodium (SENOKOT-S) 8.6-50 mg Take 2 tablets by mouth daily as needed for constipation.   Yes Brisa Rodriguez MD   amLODIPine (NORVASC) 2.5 mg tablet Take 2.5 mg by mouth daily.  2/29/24  ProviderBrisa MD   BETHANECHOL CHLORIDE ORAL Take by mouth.  2/29/24  ProviderBrisa MD       ALLERGIES      Patient has no known allergies.    FAMILY HISTORY      History reviewed. No pertinent family history.    SOCIAL HISTORY      Social History     Socioeconomic History   • Marital status:      Spouse name: None   • Number of children: None   • Years of education: None   • Highest education level: None     Social Determinants of Health     Food Insecurity: No Food Insecurity (2/29/2024)    Hunger Vital Sign    • Worried About Running Out of Food in the Last Year: Never true    • Ran Out of Food in the Last Year: Never true       REVIEW OF SYSTEMS      All other systems reviewed and negative except as noted in HPI    PHYSICAL EXAMINATION      Temp:  [36.4 °C (97.6 °F)] 36.4 °C (97.6 °F)  Heart Rate:  [] 102  Resp:  [16-20] 20  BP: (138-176)/(72-81) 169/81  There is no height or weight on file to calculate BMI.    Physical Exam  Vitals reviewed.   Constitutional:       Comments: Very [place]   Eyes:      Extraocular Movements: Extraocular movements intact.   Cardiovascular:      Rate and Rhythm: Normal rate.      Pulses: Normal pulses.      Comments: Frequent PVCs heard on auscultation  Pulmonary:      Effort: Pulmonary effort is normal.      Comments: diminished throughout, clear.  Abdominal:      General: Bowel sounds are normal.      Palpations: Abdomen is soft.      Tenderness: There is no abdominal tenderness. There  is no guarding or rebound.      Comments: Mild distension   Musculoskeletal:      Right lower leg: No edema.      Left lower leg: No edema.      Comments: Reduced R ROM   Skin:     General: Skin is warm and dry.   Neurological:      Mental Status: She is alert.      Comments: Oriented to self, time, situation. Required prompting to recall current location. Forgetful at times.         LABS / IMAGING / EKG        Labs  .  Results from last 7 days   Lab Units 02/29/24  1600   WBC K/uL 11.83*   HEMOGLOBIN g/dL 14.0   HEMATOCRIT % 42.4   PLATELETS K/uL 321     .  Results from last 7 days   Lab Units 02/29/24  1600   SODIUM mEQ/L 139   POTASSIUM mEQ/L 3.9   CHLORIDE mEQ/L 103   CO2 mEQ/L 26   BUN mg/dL 27*   CREATININE mg/dL 0.8   CALCIUM mg/dL 9.3   ALBUMIN g/dL 4.2   BILIRUBIN TOTAL mg/dL 1.1   ALK PHOS IU/L 77   ALT IU/L 14   AST IU/L 16   GLUCOSE mg/dL 111*         Imaging  ECG 12 lead   ED Interpretation   Rhythm: Normal sinus rhythm with PVCs  Rate: 99  P waves: 154 ms  QRS: 74 ms  Axis: 61 16 67  ST Segments: [no obvious ST elevation or ischemia]     Nonspecific T wave abnormality, abnormal EKG, no previous EKG for comparison.        CT PELVIS WITHOUT IV CONTRAST   Final Result   IMPRESSION:      Acute nondisplaced right superior pubic ramus fracture with associated right   pelvic sidewall/extraperitoneal hemorrhage.      X-RAY CHEST 1 VIEW   Final Result   IMPRESSION:         1. 9 mm nodular density right midlung.   2. Patchy opacities in the right upper lung which may be due to infection.   2. Further evaluation with CT chest is recommended.      X-RAY HIP WITH OR WITHOUT PELVIS 2-3 VW RIGHT   Final Result   IMPRESSION:   Possible nondisplaced fracture right superior pubic ramus. This could be better   evaluated with CT.               CT HEAD WITHOUT IV CONTRAST   Final Result   IMPRESSION:      1. No posttraumatic intracranial abnormality.   2. No acute cervical spine fractures.  As clinically indicated, MRI of  the   cervical spine is recommended for further evaluation.   3. Other incidental findings noted under the comment.         CT CERVICAL SPINE WITHOUT IV CONTRAST   Final Result   IMPRESSION:      1. No posttraumatic intracranial abnormality.   2. No acute cervical spine fractures.  As clinically indicated, MRI of the   cervical spine is recommended for further evaluation.   3. Other incidental findings noted under the comment.         CT ANGIOGRAPHY CHEST PULMONARY EMBOLISM WITH IV CONTRAST    (Results Pending)   Transthoracic echo (TTE) complete    (Results Pending)           ECG/Telemetry  I have independently reviewed the ECG. Significant findings include sinus with presupraventricular complexes HR 99.    ASSESSMENT AND PLAN           Syncope  Assessment & Plan  Patient presenting after fall at assisted living facility.  Patient states she had a syncopal episode, with LOC.  Caregivers had reported to EMS however it was a mechanical fall.  EKG shows sinus rhythm with previous supraventricular complexes, heart rate 99.  Patient currently denying chest pain.  Troponin 10.7--> 13.7     -Continuous telemetry monitoring  -Cardiology eval  -TTE  -Repeat EKG in a.m.  -Check orthostatic vitals, as able    * Fracture of right superior pubic ramus, closed, initial encounter (CMS/[facility])  Assessment & Plan  Presenting after a fall.  CT pelvis shows acute nondisplaced right superior pubic ramus fracture with right sidewall/extraperitoneal hemorrhage.  Ortho was consulted and saw patient in ED.    -Per Ortho WBAT with walker  -Pain control: Tylenol scheduled every 6 hours, lidocaine patch, morphine as needed  -PT/OT  -Case management  -Fall precautions  -SCDs for DVT prophylaxis only for now 2/2 extraperitoneal hemorrhage        Leukocytosis  Assessment & Plan  WBC 11.83.  Patient is afebrile.  Chest x-ray shows patchy opacities in right upper lung, recommend CT.  Patient denies recent cough, however occasional dry cough on exam.   Lungs clear, diminished to auscultation throughout.  UA appears negative for UTI.    -CTA chest ordered  -Follow CBC  -Monitor off antibiotics for now, pending CTA results    HTN (hypertension)  Assessment & Plan  Controlled    - Continue amlodipine    Hypothyroid  Assessment & Plan  Continue Synthroid       VTE Assessment: [place] VTE Score: 6  VTE Prophylaxis: Current anticoagulants:    •None      Code Status: Full Code  Palliative Care Screening Score: 1   Discussed advanced care planning.   Estimated Discharge Date: 3/2/2024  Disposition Planning: Pending hospital course     GUERO Silva  2/29/2024

## 2024-03-01 NOTE — CONSULTS
Orthopedic Consult Note    Subjective     Sofie Stewart is a 89 y.o. female who was admitted for   Fracture of right superior pubic ramus, closed, initial encounter (CMS/[facility]) [S32.362A].  She had a syncopal fall from standing height earlier today.  She landed on the right hip.  She believes that she became dizzy and then fell.  She is complaining of right hip and groin pain.  She has a small contusion on her forehead.  She denies numbness, tingling, other injuries, other complaints at this time.  She lives alone.  She always uses a walker.  She is also very hard of hearing.        Medical History:   History reviewed. No pertinent past medical history.    Surgical History:   History reviewed. No pertinent surgical history.    Social History:   Social History     Social History Narrative   • Not on file       Family History:   History reviewed. No pertinent family history.    Allergies: Patient has no known allergies.    No current facility-administered medications for this encounter.       Review of Systems  See HPI    Objective   Labs  CBC Results       02/29/24 12/04/23 06/05/23     1600 0808 0720    WBC 11.83 5.58 4.95    RBC 4.87 4.81 4.24    HGB 14.0 14.0 12.3    HCT 42.4 42.8 38.2    MCV 87.1 89.0 90.1    MCH 28.7 29.1 29.0    MCHC 33.0 32.7 32.2     344 274          BMP Results       02/29/24 12/06/23 12/04/23     1600 0700 0808     140 141    K 3.9 4.3 4.2    Cl 103 105 104    CO2 26 25 29    Glucose 111 87 97    BUN 27 28 20    Creatinine 0.8 0.7 0.7    Calcium 9.3 8.9 9.5    Anion Gap 10 10 8    EGFR >60.0 >60.0 >60.0         Comment for K at 1600 on 02/29/24: Results obtained on plasma. Plasma Potassium values may be up to 0.4 mEQ/L less than serum values. The differences may be greater for patients with high platelet or white cell counts.    Comment for EGFR at 1600 on 02/29/24: Calculation based on the Chronic Kidney Disease Epidemiology Collaboration (CKD-EPI) equation refit without  adjustment for race.    Comment for EGFR at 0700 on 12/06/23: Calculation based on the Chronic Kidney Disease Epidemiology Collaboration (CKD-EPI) equation refit without adjustment for race.    Comment for EGFR at 0808 on 12/04/23: Calculation based on the Chronic Kidney Disease Epidemiology Collaboration (CKD-EPI) equation refit without adjustment for race.          Microbiology Results     ** No results found for the last 720 hours. **           Imaging  XR pelvis: Right superior pubic rami fracture, severe right hip arthritis, evidence of healed left sacral ala fracture  CT pelvis: As above    Physical Exam  Temp:  [36.4 °C (97.6 °F)] 36.4 °C (97.6 °F)  Heart Rate:  [] 102  Resp:  [16-20] 20  BP: (138-176)/(72-81) 169/81  SpO2:  [94 %-98 %] 96 %     General: AVSS, NAD  Head: NC/AT  Resp: no labored breathing  Neuro: awake, alert  MSK:   RUE  Skin intact  No TTP of bony prominences  Compartments soft and compressible  No pain with ROM of shoulder/elbow/wrist/fingers  SILT median/ulnar/radial  Motor intact to axillary/musculocutaneous/radial/ulnar/median/AIN/PIN  Pulses 2+  LUE  Skin intact  No TTP of bony prominences  Compartments soft and compressible  No pain with ROM of shoulder/elbow/wrist/fingers  SILT median/ulnar/radial  Motor intact to axillary/musculocutaneous/radial/ulnar/median/AIN/PIN  Pulses 2+  RLE  Skin intact  TTP around right hip  Difficult pain with logroll  Unable to straight leg raise  Not tender around knee/ankle/foot  Compartments soft and compressible  No pain with ROM of ankle/toes  SILT in sural/saphenous/DP/SP/tibial distributions  Motor intact to EHL/FHL/TA/peroneals/GSC  Pulses 2+  LLE  Skin intact  No TTP of bony prominences  Compartments soft and compressible  No pain with ROM of hip/knee/ankle/toes  SILT in sural/saphenous/DP/SP/tibial distributions  Motor intact to quad/hamstrings/EHL/FHL/TA/peroneals/GSC  Pulses 2+    Assessment   89 y.o. female with right superior pubic rami  fracture after fall from standing height earlier today.    Plan   No orthopedic intervention at this time  Pain control  WBAT bilateral lower extremities  DVT PPX: SCDs  PT/OT  Dispo planning  Attending attestation to follow    Chip Scott, DO   Orthopedic Surgery PGY4  Pager: [phone]

## 2024-03-01 NOTE — CONSULTS
"CARDIOLOGY CONSULT NOTE    REASON FOR CONSULT: Syncope.    CONSULT FROM: Erich Caballero MD    HPI:  She is a very pleasant 89-year-old woman with suspected cognitive impairment, hypertension and hyperlipidemia who I am seeing after a fall versus syncope.    She is a poor historian.  She has a paucity of words.  She is hard of hearing.  It appears that she lives at the [facility] in [facility].  She is here as she \" fainted\".  She is unable to elucidate more about her history although it appears that occurred after having food.  She says that she felt dizzy.  However upon further questioning, she remembers hitting the ground.  She remembers striking her head.  She reports no current chest pain or shortness of breath.  She reports no current palpitations.  She has never fainted before.    PAST MEDICAL & SURGICAL HISTORY:  DJD  Frequent Falls.  HTN  HL  hypothyroid  MEDICATIONS:  Home medications    •  amLODIPine, Take 2.5 mg by mouth daily.  •  bethanechol, Take 10 mg by mouth 3 (three) times a day.  •  fluticasone propionate, Administer 1 spray into each nostril daily.  •  levothyroxine, Take 88 mcg by mouth daily.  •  pravastatin, Take 20 mg by mouth nightly.  •  sennosides-docusate sodium, Take 2 tablets by mouth daily as needed for constipation.  ALLERGIES:  Patient has no known allergies.    SOCIAL HISTORY:  Native of [place].  .  Lives in the [facility].  FAMILY HISTORY:  No premature CAD    REVIEW OF SYSTEMS:  Constitutional: denies weight gain and weight loss  HEENT: denies blurred vision and irritation sore throat and hoarseness  Respiratory: denies dyspnea on exertion and chest pain/tightness  Cardiovascular: d possible syncope.  Gastrointestinal: denies nausea, vomiting and diarrhea  Genitourinary: denies painful urination and frequency  Integument: denies itching and dryness  Hematologic/lymphatic:  denies easy bruising and petechiae  Musculoskeletal positive for hip pain.  Possible syncope.  " "Neurological: denies vertigo and tremors  Behavioral/Psych: denies anxiety and depression  Endocrine: denies cold intolerance and heat intolerance    PHYSICAL EXAM:  Temp:  [36.3 °C (97.4 °F)-36.8 °C (98.3 °F)] 36.3 °C (97.4 °F)  Heart Rate:  [] 68  Resp:  [16-20] 20  BP: (138-176)/(70-82) 158/74  No intake or output data in the 24 hours ending 03/01/24 0807  Physical exam:  General appearance: Pleasant.  Frail.  Temporal wasting.  Head: without obvious abnormality  Eyes: PERRLA, extraocular movements intact  Neck: no JVD, carotid bruits, thyromegaly  Lungs: clear to auscultation bilaterally, no crackles or wheezing  Heart: regular rate and rhythm, S1-S2 normal, no murmurs, clicks, rubs or gallops  Abdomen: soft, non-tender; bowel sounds normal; no masses  Extremities: no edema, peripheral pulses present  Skin: Skin color, texture, turgor normal. No rashes or lesions  Neurologic: Alert and oriented X 1.  LABS:   Results from last 7 days   Lab Units 03/01/24  0632 02/29/24  1600   SODIUM mEQ/L 138 139   POTASSIUM mEQ/L 3.8 3.9   CHLORIDE mEQ/L 103 103   CO2 mEQ/L 27 26   BUN mg/dL 21 27*   CREATININE mg/dL 0.7 0.8   AST IU/L  --  16   ALT IU/L  --  14     Results from last 7 days   Lab Units 03/01/24  0632 02/29/24  1600   WBC K/uL 10.10 11.83*   HEMOGLOBIN g/dL 12.8 14.0   HEMATOCRIT % 38.5 42.4   PLATELETS K/uL 311 321   INR   --  0.9     Lab Results   Component Value Date    TSH 1.45 12/04/2023     No results found for: \"CHOL\", \"LDLCALC\", \"HDL\", \"TRIG\"  No results found for: \"BNP\"    IMAGING:  CT    IMPRESSION:  1.  No evidence of pulmonary emboli.  2.  Small right pleural effusion.     --  IMPRESSION:        1. 9 mm nodular density right midlung.  2. Patchy opacities in the right upper lung which may be due to infection.  2. Further evaluation with CT chest is recommended.  ECG:  SR PAC. NST changes.      TELEMETRY: Sinus rhythm.      PROBLEM LIST:  Principal Problem:    Fracture of right superior pubic " rufus, closed, initial encounter (CMS/[facility])  Active Problems:    Syncope    Hypothyroid    Leukocytosis    HTN (hypertension)      ASSESSMENT AND PLAN:    She is a very pleasant 89-year-old woman with suspected cognitive impairment, hypertension and hyperlipidemia who I am seeing after a fall versus syncope.    With regards for her episode, as she is a poor historian, this is a challenge.  From the chart, it appears that this was witnessed and that she fell and did not pass out.  In fact, the patient upon further questioning did remember hitting the ground.  I cannot rule out syncope.  So far her testing is been unremarkable.  I would continue telemetry for 24 hours.  Her EKG shows no signs of prolonged AV delay, QT prolongation, or QRS prolongation.  Her telemetry has been unremarkable.  I do agree with an echocardiogram.  I would not favor long-term event monitoring unless I had more corroborative data to suggest that she actually passed out.    She is of low risk for repair of her hip fracture without further testing.    She does have hypertension.  I would be careful regarding over titration of medicines for her blood pressure.  Her blood pressure likely will be low postoperatively.  She is on amlodipine.    She is on Pravachol.  This certainly seems reasonable.  We will check a thyroid panel given her prior hypothyroidism.  We will follow-up on her echocardiogram.  If you have any questions or concerns, please do not hesitate to call.    Attila Beltre MD [facility]  [facility]    Primary Care Physician: Elba Patiño, PEDRO

## 2024-03-01 NOTE — ASSESSMENT & PLAN NOTE
Patient presenting after fall at assisted living facility.  Patient states she had a syncopal episode, with LOC.  Caregivers had reported to EMS however it was a mechanical fall.   Patient denied chest pain.  Troponin 10.7--> 13.7     -Continuous telemetry monitoring  -Echo- normal EF, no regional wall motion abnormalities  -Cardiology on board

## 2024-03-01 NOTE — PLAN OF CARE
Problem: Adult Inpatient Plan of Care  Goal: Plan of Care Review  Outcome: Progressing  Flowsheets (Taken 3/1/2024 1233)  Progress: improving  Outcome Evaluation: PT eval completed  Plan of Care Reviewed With: patient     Problem: Mobility Impairment  Goal: Optimal Mobility  Outcome: Progressing

## 2024-03-01 NOTE — HOSPITAL COURSE
Sofie is a 89 y.o. female admitted on 2/29/2024 with Syncope and collapse [R55]  Fall, initial encounter [W19.XXXA]  Fracture of right superior pubic ramus, closed, initial encounter (CMS/[facility]) [S32.511A]  Closed fracture of ramus of right pubis, initial encounter (CMS/[facility]) [S32.591A]. Principal problem is Fracture of right superior pubic ramus, closed, initial encounter (CMS/[facility]).    Past Medical History  Sofie has no past medical history on file.    History of Present Illness   Sofie Stewart is a 89 y.o. female with a past medical history of HTN, hyponatremia, hypothyroid, HLD who presents following a fall earlier today. CT head negative, CT C-spine negative, chest x-ray shows 9 mm nodular density right midlung, patchy opacities in the right upper lung.   CT pelvis shows acute nondisplaced right superior pubic ramus fracture with right sidewall/extraperitoneal hemorrhage. Per Ortho WBAT with walker

## 2024-03-01 NOTE — PLAN OF CARE
Care Coordination Admission Assessment Note    General Information:  Readmission Within the last 30 days: no previous admission in last 30 days    Living Arrangements:  Current Living Arrangements: assisted living facility  People in Home: alone    Housing Stability and Utility Access (SDOH):  In the last 12 months, was there a time when you were not able to pay the mortgage or rent on time?: No  In the last 12 months, how many places have you lived?:    In the last 12 months, was there a time when you did not have a steady place to sleep or slept in a shelter (including now)?: No  In the past 12 months has the electric, gas, oil, or water company threatened to shut off services in your home?: No    Functional Status Prior to Admission:   Assistive Device/Animal Currently Used at Home: walker, front-wheeled      Patient Choice:   Offered/Gave Vendor List: no    Anticipated Discharge Plan:  Met with patient. Provided education and contact information for Care Coordination services.: yes  Anticipated Discharge Disposition: skilled nursing facility     Transportation Needs (SDOH):  Transportation Concerns: none  Is Out of Hospital DNR needed at discharge?: yes    In the past 12 months, has lack of transportation kept you from medical appointments or from getting medications?: No  In the past 12 months, has lack of transportation kept you from meetings, work, or from getting things needed for daily living?: No    Concerns - comments: Met with pt's son, Chu, to discuss discharge plan.  Pt lives at [facility] in [place].  Pt used a rollator to ambulate PTA.  Pt independent with ADLs.  Pt has been to [facility] SNF in the past.  Pt's son, Chu, is pt's POA.  Pt's son is agreeable to referral to [facility].

## 2024-03-01 NOTE — PROGRESS NOTES
Physical Therapy -  Initial Evaluation     Patient: Sofie Stewart  Location: [facility] 3 Main 0334D  MRN: [id number]  Today's date: 3/1/2024    HISTORY OF PRESENT ILLNESS     Sofie is a 89 y.o. female admitted on 2/29/2024 with Syncope and collapse [R55]  Fall, initial encounter [W19.XXXA]  Fracture of right superior pubic ramus, closed, initial encounter (CMS/[facility]) [S32.511A]  Closed fracture of ramus of right pubis, initial encounter (CMS/[facility]) [S32.591A]. Principal problem is Fracture of right superior pubic ramus, closed, initial encounter (CMS/[facility]).    Past Medical History  Sofie has no past medical history on file.    History of Present Illness   Sofie Stewart is a 89 y.o. female with a past medical history of HTN, hyponatremia, hypothyroid, HLD who presents following a fall earlier today. CT head negative, CT C-spine negative, chest x-ray shows 9 mm nodular density right midlung, patchy opacities in the right upper lung.   CT pelvis shows acute nondisplaced right superior pubic ramus fracture with right sidewall/extraperitoneal hemorrhage. Per Ortho WBAT with walker    PRIOR LEVEL OF FUNCTION AND LIVING ENVIRONMENT     Prior Level of Function    Flowsheet Row Most Recent Value   Dominant Hand right   Ambulation assistive equipment   Transferring assistive equipment   Toileting assistive equipment   Bathing assistive equipment   Dressing independent   Eating independent   IADLs assistive person   Driving/Transportation friends/family   Communication understands/communicates without difficulty   Assistive Device Currently Used at Home walker, front-wheeled, shower chair, grab bar        Prior Living Environment    Flowsheet Row Most Recent Value   People in Home alone   Current Living Arrangements assisted living facility        VITALS AND PAIN     PT Vitals    Date/Time Pulse SpO2 Pt Activity O2 Therapy BP Pt Position Who   03/01/24 1050 75 93 % At rest None (Room air) 127/60 Lying ERS   03/01/24 1055  84 89 % Walking None (Room air) 131/63 Standing ERS      PT Pain    Date/Time Side/Orientation Location Rating: Rest Rating: Activity Interventions [facility]   03/01/24 1050 right pelvic 0 - no pain 6 - moderate-severe pain relaxation techniques promoted;quiet environment facilitated;breathing exercises utilized;diversional activity provided ERS           Objective   OBJECTIVE     Start time:  1047  End time:  1114  Session Length: 27 min  Mode of Treatment: co-treatment, physical therapy (co-eval with OT due to pt tolerance.  PT focus on mobility, balance, and LE's)    General Observations  Patient received in bed. She was agreeable to therapy, no issues or concerns identified by nurse prior to session.      Precautions: fall, cardiac, weight bearing  Extremity Weight-Bearing Status: left lower extremity, right lower extremity  Left LE Weight-Bearing Status: weight-bearing as tolerated (WBAT)  Right LE Weight-Bearing Status: weight-bearing as tolerated (WBAT)    Limitations/Impairments: safety/cognitive, hearing      PT Eval and Treat - 03/01/24 1047        Cognition    Orientation Status oriented to;person;time     Affect/Mental Status WFL     Follows Commands follows one-step commands;75-90% accuracy;increased processing time needed;delayed response/completion;physical/tactile prompts required;repetition of directions required;verbal cues/prompting required     Cognitive Function executive function deficit;safety deficit        Vision Assessment/Intervention    Vision Assessment corrective lenses for reading        Hearing Assessment    Hearing Status hearing aid, bilateral        Sensory Assessment    Sensory Assessment sensation intact, lower extremities        Lower Extremity Assessment    LE Assessment ROM and Strength WFL except        Lower Extremity Range of Motion    Hip, Right (ROM) flex 60 deg., limited by pain        Lower Extremity Strength    Hip, Right (Strength) 3-/5 flex     Knee, Right (Strength) 3+/5  ext        Bed Mobility    Bed Mobility Activities left;sit to supine;supine to sit     [place] moderate assist (50-74% patient effort);2 person assist     Assistive Device bed rails;draw sheet;head of bed elevated        Mobility Belt    Mobility Belt Used for All Out of Bed Activity yes        Sit/Stand Transfer    Surface edge of bed     [place] minimum assist (75% or more patient effort);2 person assist     Safety/Cues hand placement;technique;sequencing     Assistive Device walker, front-wheeled     Transfer Comments static stand with RW and min Ax2 for 60 seconds, unable to weight shift due to severe R pelvic pain        Surface-to-Surface Transfers    Transfer Location bed to chair;chair to bed     Transfer Technique stand step   moving to L    [place] moderate assist (50-74% patient effort);2 person assist     Safety/Cues increased time to complete;hand placement;technique;sequencing     Assistive Device none        Gait Training    [place], Gait unable to assess        Balance    Static Sitting Balance WFL     Dynamic Sitting Balance mild impairment     Sit to Stand Dynamic Balance moderate impairment;supported     Static Standing Balance mild impairment;supported     Dynamic Standing Balance severe impairment;supported     Comment, Balance with RW        Impairments/Safety Issues    Impairments Affecting Function balance;cognition;endurance/activity tolerance;pain;postural/trunk control;range of motion (ROM);strength     Cognitive Impairments, Safety/Performance judgment;sequencing abilities;problem-solving/reasoning;awareness, need for assistance;insight into deficits/self-awareness     Functional Endurance fair-, desaturation to 89% on RA with bed to[place] [facility], also limited by severe pain in R pelvis with standing/WBing                                Education Documentation  Unresolved/Worsening Symptoms, taught by Marie Brown PT at 3/1/2024 12:34 PM.  Learner:  Patient  Readiness: Acceptance  Method: Explanation, Demonstration  Response: Verbalizes Understanding, Demonstrated Understanding, Needs Reinforcement  Comment: role of PT, POC, safety, precautions    Joint Mobility/Strength, taught by Marie Brown PT at 3/1/2024 12:34 PM.  Learner: Patient  Readiness: Acceptance  Method: Explanation, Demonstration  Response: Verbalizes Understanding, Demonstrated Understanding, Needs Reinforcement  Comment: role of PT, POC, safety, precautions    Home Safety, taught by Marie Brown PT at 3/1/2024 12:34 PM.  Learner: Patient  Readiness: Acceptance  Method: Explanation, Demonstration  Response: Verbalizes Understanding, Demonstrated Understanding, Needs Reinforcement  Comment: role of PT, POC, safety, precautions    Energy Conservation, taught by Marie Brown PT at 3/1/2024 12:34 PM.  Learner: Patient  Readiness: Acceptance  Method: Explanation, Demonstration  Response: Verbalizes Understanding, Demonstrated Understanding, Needs Reinforcement  Comment: role of PT, POC, safety, precautions    Assistive/Adaptive Devices, taught by Marie Brown PT at 3/1/2024 12:34 PM.  Learner: Patient  Readiness: Acceptance  Method: Explanation, Demonstration  Response: Verbalizes Understanding, Demonstrated Understanding, Needs Reinforcement  Comment: role of PT, POC, safety, precautions    Signs/Symptoms, taught by Marie Brown PT at 3/1/2024 12:34 PM.  Learner: Patient  Readiness: Acceptance  Method: Explanation, Demonstration  Response: Verbalizes Understanding, Demonstrated Understanding, Needs Reinforcement  Comment: role of PT, POC, safety, precautions    Risk Factors, taught by Marie Brown PT at 3/1/2024 12:34 PM.  Learner: Patient  Readiness: Acceptance  Method: Explanation, Demonstration  Response: Verbalizes Understanding, Demonstrated Understanding, Needs Reinforcement  Comment: role of PT, POC, safety, precautions        Session Outcome  Patient in bed at  end of session, bed alarm on, personal items in reach, all needs met, call light in reach. Nursing notified about change in vital signs, patient's performance, patient's position, and patient's response to therapy/activity.    AM-PAC™ - Mobility (Current Function)     Turning form your back to your side while in flat bed without using bedrails 2 - A Lot   Moving from lying on your back to sitting on the side of a flat bed without using bedrails 2 - A Lot   Moving to and from a bed to a chair 2 - A Lot   Standing up from a chair using your arms 2 - A Lot   To walk in a hospital room 1 - Total   Climbing 3-5 steps with a railing 1 - Total   AM-PAC™ Mobility Score 10      ASSESSMENT AND PLAN     Progress Summary  Pt presents at mod/min Ax2 for functional mobility with use of RW, which is a decline from her baseline mod I functional level with use of RW.  Ampac= 10, which indicates SNF.  Pt will benefit from continued skilled PT to maximize independence, aerobic capacity, and safety.    Patient/Family Therapy Goals Statement: return to baseline    PT Plan    Flowsheet Row Most Recent Value   Rehab Potential good, to achieve stated therapy goals at 03/01/2024 1047   Therapy Frequency 5 times/wk at 03/01/2024 1047   Planned Therapy Interventions balance training, bed mobility training, gait training, home exercise program, patient/family education, postural re-education, ROM (range of motion), strengthening, transfer training, wheelchair management/propulsion training at 03/01/2024 1047          PT Discharge Recommendations    Flowsheet Row Most Recent Value   PT Recommended Discharge Disposition skilled nursing facility at 03/01/2024 1047   Anticipated Equipment Needs if Discharged Home (PT) wheelchair, commode, 3-in-1, wheelchair cushion  [use of RW, which pt owns] at 03/01/2024 1047               PT Goals    Flowsheet Row Most Recent Value   Bed Mobility Goal 1    Activity/Assistive Device sit to supine/supine to sit  at 03/01/2024 1047   [place] supervision required at 03/01/2024 1047   Time Frame by discharge at 03/01/2024 1047   Progress/Outcome goal ongoing at 03/01/2024 1047   Transfer Goal 1    Activity/Assistive Device sit-to-stand/stand-to-sit, walker, front-wheeled at 03/01/2024 1047   [place] supervision required at 03/01/2024 1047   Time Frame by discharge at 03/01/2024 1047   Progress/Outcome goal ongoing at 03/01/2024 1047   Gait Training Goal 1    Activity/Assistive Device gait (walking locomotion), walker, front-wheeled at 03/01/2024 1047   [place] minimum assist (75% or more patient effort) at 03/01/2024 1047   Distance 30 at 03/01/2024 1047   Time Frame by discharge at 03/01/2024 1047   Progress/Outcome goal ongoing at 03/01/2024 1047

## 2024-03-01 NOTE — PLAN OF CARE
Pt admitted for R superior pubic ramus fx. O x 3, extremely [place], no HAs. Pain managed with scheduled Tylenol. Plan for echo today. SR on tele monitor, VSS. Call bell in reach, bed alarm set.      Problem: Adult Inpatient Plan of Care  Goal: Plan of Care Review  Outcome: Progressing  Flowsheets (Taken 3/1/2024 4846)  Progress: no change  Plan of Care Reviewed With: patient  Goal: Patient-Specific Goal (Individualized)  Outcome: Progressing  Goal: Absence of Hospital-Acquired Illness or Injury  Outcome: Progressing  Goal: Optimal Comfort and Wellbeing  Outcome: Progressing  Goal: Readiness for Transition of Care  Outcome: Progressing     Problem: Fall Injury Risk  Goal: Absence of Fall and Fall-Related Injury  Outcome: Progressing     Problem: Hypertension Comorbidity  Goal: Blood Pressure in Desired Range  Outcome: Progressing     Problem: Pain Acute  Goal: Optimal Pain Control and Function  Outcome: Progressing     Problem: Mobility Impairment  Goal: Optimal Mobility  Outcome: Not progressing     Problem: Self-Care Deficit  Goal: Improved Ability to Complete Activities of Daily Living  Outcome: Not progressing

## 2024-03-01 NOTE — PROGRESS NOTES
Occupational Therapy -  Initial Evaluation     Patient: Sofie Stewart  Location: [facility] 3 Main [address]334D  MRN: [id number]  Today's date: 3/1/2024    HISTORY OF PRESENT ILLNESS     Sofie is a 89 y.o. female admitted on 2/29/2024 with Syncope and collapse [R55]  Fall, initial encounter [W19.XXXA]  Fracture of right superior pubic ramus, closed, initial encounter (CMS/[facility]) [S32.511A]  Closed fracture of ramus of right pubis, initial encounter (CMS/[facility]) [S32.591A]. Principal problem is Fracture of right superior pubic ramus, closed, initial encounter (CMS/[facility]).    Past Medical History  Sofie has no past medical history on file.    History of Present Illness   Sofie Stewart is a 89 y.o. female with a past medical history of HTN, hyponatremia, hypothyroid, HLD who presents following a fall earlier today. CT head negative, CT C-spine negative, chest x-ray shows 9 mm nodular density right midlung, patchy opacities in the right upper lung.   CT pelvis shows acute nondisplaced right superior pubic ramus fracture with right sidewall/extraperitoneal hemorrhage. Per Ortho WBAT with walker    PRIOR LEVEL OF FUNCTION AND LIVING ENVIRONMENT     Prior Level of Function    Flowsheet Row Most Recent Value   Dominant Hand right   Ambulation assistive equipment   Transferring assistive equipment   Toileting assistive equipment   Bathing assistive equipment   Dressing independent   Eating independent   IADLs assistive person   Driving/Transportation friends/family   Communication understands/communicates without difficulty   Assistive Device Currently Used at Home walker, front-wheeled        Prior Living Environment    Flowsheet Row Most Recent Value   People in Home alone   Current Living Arrangements assisted living facility        Occupational Profile    Flowsheet Row Most Recent Value   Occupational History/Life Experiences [place] [facility]        VITALS AND PAIN     OT Vitals    Date/Time Pulse SpO2 Pt Activity O2 Therapy  BP Pt Position [facility]   03/01/24 1050 75 93 % At rest None (Room air) 127/60 Lying ERS   03/01/24 1055 84 89 % Walking None (Room air) 131/63 Standing ERS      OT Pain    Date/Time Side/Orientation Location Rating: Rest Rating: Activity Interventions [facility]   03/01/24 1050 right pelvic 0 - no pain 6 - moderate-severe pain relaxation techniques promoted;quiet environment facilitated;breathing exercises utilized;diversional activity provided ERS           Objective   OBJECTIVE     Start time:  1046  End time:  1111  Session Length: 25 min  Mode of Treatment: co-treatment, occupational therapy (tolerance/pain. OT assessing ADL)    General Observations  Patient received in bed. She was no issues or concerns identified by nurse prior to session, agreeable to therapy.      Precautions: fall, cardiac, weight bearing  Extremity Weight-Bearing Status: left lower extremity, right lower extremity  Left LE Weight-Bearing Status: weight-bearing as tolerated (WBAT)  Right LE Weight-Bearing Status: weight-bearing as tolerated (WBAT)    Limitations/Impairments: safety/cognitive, hearing      OT Eval and Treat - 03/01/24 1046        Cognition    Orientation Status oriented to;person;time;disoriented to;place     Affect/Mental Status WFL;confused     Follows Commands follows one-step commands;75-90% accuracy     Cognitive Function safety deficit;executive function deficit     Executive Function Deficit moderate deficit;information processing;self-monitoring/self-correction     Comment, Executive Function limited by hearing deficits     Safety Deficit moderate deficit;awareness of need for assistance;safety precautions awareness     Comment, Cognition pleasantly confused. AOx2 requires repetition due to hearing deficits        Vision Assessment/Intervention    Vision Assessment corrective lenses for reading        Hearing Assessment    Hearing Status hearing aid, bilateral     Impact of Hearing Impairment on Functional Status moderate  deficit        Sensory Assessment    Sensory Assessment sensation intact, upper extremities        Upper Extremity Assessment    UE Assessment ROM and Strength WFL        Bed Mobility    Bed Mobility Activities left;supine to sit;sit to supine     [place] moderate assist (50-74% patient effort);2 person assist     Assistive Device head of bed elevated;bed rails     Comment assist BLE/trunk        Mobility Belt    Mobility Belt Used for All Out of Bed Activity yes        Sit/Stand Transfer    Surface edge of bed     [place] minimum assist (75% or more patient effort);2 person assist     Safety/Cues sequencing;hand placement;technique     Assistive Device walker, front-wheeled     Transfer Comments w/ rw limited by pain        Surface-to-Surface Transfers    Transfer Technique stand pivot     [place] moderate assist (50-74% patient effort);2 person assist     Safety/Cues sequencing;hand placement     Transfer Comments see toilet        Toilet Transfer    Transfer Technique stand pivot     [place] moderate assist (50-74% patient effort);2 person assist     Safety/Cues sequencing;technique     Assistive Device commode, 3-in-1     Comment drop arm commode MOD A x2 for pivot with anterior/posterior assist        Lower Body Dressing    Tasks socks     [place] dependent (less than 25% patient effort)        Toileting    Tasks perform bladder hygiene;adjust/manage clothing     [place] moderate assist (50-74% patient effort)     Position supported sitting     Adaptive Equipment commode, drop-arm     Comment (+) void        Balance    Static Sitting Balance WFL     Dynamic Sitting Balance mild impairment     Sit to Stand Dynamic Balance moderate impairment;supported     Static Standing Balance mild impairment;supported     Dynamic Standing Balance severe impairment;supported     Comment, Balance MOD A x2 SPT to chair        Impairments/Safety Issues    Impairments Affecting Function  balance;cognition;endurance/activity tolerance;pain;postural/trunk control;range of motion (ROM);strength     Cognitive Impairments, Safety/Performance judgment;sequencing abilities;problem-solving/reasoning;awareness, need for assistance;insight into deficits/self-awareness     Functional Endurance fair (-) spO2 desat 89% on RA, pain severe                                Education Documentation  Treatment Plan, taught by Marie Cazares OT at 3/1/2024  3:05 PM.  Learner: Patient  Readiness: Acceptance  Method: Explanation  Response: Verbalizes Understanding  Comment: OT POC, ADL safety, SPT, WBAT, reorientation        Session Outcome  Patient upright, in bed at end of session, bed alarm on, personal items in reach. Nursing notified about patient's performance and patient's position.    AM-PAC™ - ADL (Current Function)     Putting on/taking off regular lower body clothing 1 - Total   Bathing 2 - A Lot   Toileting 2 - A Lot   Putting on/taking off regular upper body clothing 3 - A Little   Help for taking care of personal grooming 3 - A Little   Eating meals 3 - A Little   AM-PAC™ ADL Score 14      ASSESSMENT AND PLAN     Progress Summary  OT eval complete. AMPAC 14. Pt admitted w/ fall (+) pubic fx WBAT BLE. Pt limited overall by pain, balance and endurance. Pt req MIN A x2 for STS w/ rw. Pt MOD A x2 for SPT to commode with anterior/posterior assist. Pt DEP for LBD. Requires MOD A for toilet hygiene. Rec cont OT to increase indep w/ ADL and transfer    Patient/Family Therapy Goal Statement: home    OT Plan    Flowsheet Row Most Recent Value   Rehab Potential fair, will monitor progress closely at 03/01/2024 1046   Therapy Frequency 3 times/wk at 03/01/2024 1046   Planned Therapy Interventions occupation/activity based interventions, transfer/mobility retraining, patient/caregiver education/training, BADL retraining, ROM/therapeutic exercise at 03/01/2024 1046          OT Discharge Recommendations    Flowsheet Row  Most Recent Value   OT Recommended Discharge Disposition skilled nursing facility at 03/01/2024 1046   Anticipated Equipment Needs if Discharged Home (OT) none at 03/01/2024 1046               OT Goals    Flowsheet Row Most Recent Value   Bed Mobility Goal 1    Activity/Assistive Device bed mobility activities, all at 03/01/2024 1046   [place] minimum assist (75% or more patient effort) at 03/01/2024 1046   Time Frame by discharge at 03/01/2024 1046   Progress/Outcome goal ongoing at 03/01/2024 1046   Transfer Goal 1    Activity/Assistive Device sit-to-stand/stand-to-sit, bed-to-chair/chair-to-bed, toilet, stand pivot at 03/01/2024 1046   [place] supervision required at 03/01/2024 1046   Time Frame by discharge at 03/01/2024 1046   Progress/Outcome goal ongoing at 03/01/2024 1046   Dressing Goal 1    Activity/Adaptive Equipment dressing skills, all at 03/01/2024 1046   [place] supervision required at 03/01/2024 1046   Time Frame by discharge at 03/01/2024 1046   Progress/Outcome goal ongoing at 03/01/2024 1046   Toileting Goal 1    Activity/Assistive Device toileting skills, all at 03/01/2024 1046   Time Frame by discharge at 03/01/2024 1046   Progress/Outcome goal ongoing at 03/01/2024 1046         MEDICINE

## 2024-03-01 NOTE — PROGRESS NOTES
[facility] Medicine Service -  Daily Progress Note       SUBJECTIVE   Interval History: She is [place], denied any pain during my evaluation. Does not appear to be in any distress.      OBJECTIVE      Vital signs in last 24 hours:  Temp:  [36.3 °C (97.4 °F)-36.8 °C (98.3 °F)] 36.3 °C (97.4 °F)  Heart Rate:  [] 84  Resp:  [19-20] 20  BP: (127-174)/(60-82) 131/63  No intake or output data in the 24 hours ending 03/01/24 1420    PHYSICAL EXAMINATION      Gen: Appears stated age, not in any distress, NC  Head: atraumatic, normocephalic  Eyes: PERRLA, EOMI, no pallor  Neck: supple, no Lymph nodes, no Thyromegaly, no JVD  CVS: RRR, No M/G, no JVD  Pulm: CTA B/l, no wheezing or rhonchi, no rales  Abd: Soft, NT, ND, normal bowel sounds  Extremities:no edema, no cyanosis   MSK: no DJD, no joint swellings, no joint tenderness   Neuro: AAO x3     LINES, CATHETERS, DRAINS, AIRWAYS, AND WOUNDS   Lines, Drains, Airways, Wounds:  Peripheral IV (Adult) 02/29/24 Right Antecubital (Active)   Number of days: 1       Peripheral IV (Adult) 03/01/24 Anterior;Left;Proximal Forearm (Active)   Number of days: 0       Wound Laceration Anterior;Upper Head (Active)   Number of days: 0       Comments:      LABS / IMAGING / TELE      Labs  Results from last 7 days   Lab Units 03/01/24  0632   WBC K/uL 10.10   HEMOGLOBIN g/dL 12.8   HEMATOCRIT % 38.5   PLATELETS K/uL 311     Results from last 7 days   Lab Units 03/01/24  0632   SODIUM mEQ/L 138   POTASSIUM mEQ/L 3.8   CHLORIDE mEQ/L 103   CO2 mEQ/L 27   BUN mg/dL 21   CREATININE mg/dL 0.7   GLUCOSE mg/dL 126*   CALCIUM mg/dL 9.2         Micro  Microbiology Results     ** No results found for the last 720 hours. **          Imaging  Transthoracic echo (TTE) complete    Result Date: 3/1/2024  Narrative: 1.  Normal LV size and function with ejection fraction of 65 to 70%. 2.  No regional wall motion abnormalities identified . 3.  Great I diastolic dysfunction . 4.  No significant valvular heart  disease seen.     CT ANGIOGRAPHY CHEST PULMONARY EMBOLISM WITH IV CONTRAST    Result Date: 2/29/2024  Narrative: CLINICAL HISTORY: Dizziness and left hip pain. COMMENT: CT angiogram examination of the chest was performed using contiguous 3 mm transaxial sections. Reformats were made in the coronal and sagittal planes by the technologist. Images were acquired after the uneventful administration of 100 cc of Isovue-370 intravenously. 3D MIP and/or volume rendered image reconstruction performed and reviewed. CT DOSE:  One or more dose reduction techniques (e.g. automated exposure control, adjustment of the mA and/or kV according to patient size, use of iterative reconstruction technique) utilized for this examination. Comparison Studies: None. Lower Neck: Thyroid nodules, largest in the right lobe measuring 1.6 cm Axilla: Negative. Lung parenchyma: Partial atelectasis of the right upper lobe and both lower lobes. Pleura: Small right pleural effusion.  No pneumothorax. Mediastinum and mariel: No evidence of pulmonary emboli.  Atherosclerotic calcifications of the thoracic aorta. Heart and pericardium: Cardiomegaly.  No pericardial thickening or significant effusion. Chest wall: Large hiatal hernia. Bones: Multilevel thoracic spine degenerative changes.  No suspicious osteolytic or osteoblastic lesions.  Glenohumeral joint degenerative changes. Osteochondral loose bodies in the subcoracoid region on the right.  S-shaped thoracolumbar scoliosis. Visualized portions of Upper Abdomen: Negative.     Impression: IMPRESSION: 1.  No evidence of pulmonary emboli. 2.  Small right pleural effusion.     CT PELVIS WITHOUT IV CONTRAST    Result Date: 2/29/2024  Narrative: CLINICAL HISTORY:    Rule out occult hip fracture COMPARISON:    None COMMENT: Technique: CT of the pelvis without contrast: CT DOSE:  One or more dose reduction techniques (e.g. automated exposure control, adjustment of the mA and/or kV according to patient size, use  of iterative reconstruction technique) utilized for this examination. COMMENT: Bones: Osteopenia. Acute nondisplaced right superior pubic ramus fracture. Joints: No osseous malalignment. Severe osteoarthritis of the right hip. Mild left hip osteoarthritis Soft Tissues: There is right extra peritoneal hematoma noted in the space of Retzius/right pelvic sidewall. A right adnexal cyst measuring 3.8 cm     Impression: IMPRESSION: Acute nondisplaced right superior pubic ramus fracture with associated right pelvic sidewall/extraperitoneal hemorrhage.    X-RAY HIP WITH OR WITHOUT PELVIS 2-3 VW RIGHT    Result Date: 2/29/2024  Narrative: CLINICAL HISTORY: Traumatic hip pain post fall TECHNIQUE: AP pelvis. AP and 2 frog leg lateral radiographs right hip. COMPARISON: None available FINDINGS: Diffuse osteopenia. Question fracture of the right superior pubic ramus. Proximal right femur intact. Alignment maintained. Severe degenerative changes right hip. Sacrum obscured by bowel gas.     Impression: IMPRESSION: Possible nondisplaced fracture right superior pubic ramus. This could be better evaluated with CT.     X-RAY CHEST 1 VIEW    Result Date: 2/29/2024  Narrative: CLINICAL HISTORY: Syncope. AP portable view of the chest. COMPARISON: None available. COMMENT: The heart is enlarged. The cardiomediastinal silhouette is unremarkable. The lungs are hyperinflated suggesting COPD. There is a subtle 9 mm nodular density right midlung. Patchy opacities in the right upper lung may be due to infection. There is a hiatal hernia. There are degenerative changes of the spine and shoulders.     Impression: IMPRESSION: 1. 9 mm nodular density right midlung. 2. Patchy opacities in the right upper lung which may be due to infection. 2. Further evaluation with CT chest is recommended.    CT HEAD WITHOUT IV CONTRAST, CT CERVICAL SPINE WITHOUT IV CONTRAST    Result Date: 2/29/2024  Narrative: CLINICAL HISTORY: Status post fall. COMMENT: Contiguous  axial CT images of the brain and cervical spine are performed with sagittal and coronal reformatted images. COMPARISON:  None CT DOSE: One or more dose reduction technique (e.g.automated exposure control, adjustment of the kV and/or mA according to the patient's size, use of iterative reconstruction technique) utilized for this examination. CT BRAIN: Generalized cerebral volume loss is noted.  Decrease in density is seen in the periventricular white matter tracts most likely representing chronic small vessel ischemic disease.  No intraparenchymal mass, acute infarcts or hemorrhages are seen.  The ventricles and the cisterns are normal. No extra-axial masses or focal fluid collections are seen. There is no midline shift. Visualized portions of the paranasal sinuses and the mastoid air cells are clear. No acute fractures are seen. CT CERVICAL SPINE: Diffuse osteopenia is noted.  No acute fractures or posttraumatic spondylolisthesis are identified. No soft tissue edema is seen.  Minimal degenerative spondylolisthesis is seen at C3-C4 and C4-C5 levels where the inferior vertebral bodies are anterior to superior vertebral bodies. Degenerative changes are identified most prominent at C4-C5 level where there is mild to moderate disc osteophyte bulge with bilateral uncovertebral and facet joint hypertrophy causing mild central canal stenosis and moderate bilateral neural foraminal stenosis. The sagittal and coronal reformatted images demonstrate normal facet joint alignment. Evaluation of lower cervical spine is limited due bilateral shoulders. Visualized lung apices demonstrate linear opacity in the posterior portion of the right upper lobe possibly attending atelectasis.  Of note, evaluation of the lung parenchyma is somewhat limited secondary to patient motion.     Impression: IMPRESSION: 1. No posttraumatic intracranial abnormality. 2. No acute cervical spine fractures.  As clinically indicated, MRI of the cervical  spine is recommended for further evaluation. 3. Other incidental findings noted under the comment.           ASSESSMENT AND PLAN      HTN (hypertension)  Assessment & Plan  Controlled    - Continue amlodipine    Leukocytosis  Assessment & Plan  -resolved       Hypothyroid  Assessment & Plan  Continue Synthroid    Syncope  Assessment & Plan  Patient presenting after fall at assisted living facility.  Patient states she had a syncopal episode, with LOC.  Caregivers had reported to EMS however it was a mechanical fall.   Patient denied chest pain.  Troponin 10.7--> 13.7     -Continuous telemetry monitoring  -Echo- normal EF, no regional wall motion abnormalities  -Cardiology on board     * Fracture of right superior pubic ramus, closed, initial encounter (CMS/[facility], [facility])  Assessment & Plan  Presenting after a fall.  CT pelvis shows acute nondisplaced right superior pubic ramus fracture with right sidewall/extraperitoneal hemorrhage.  Ortho was consulted and saw patient in ED.    -Per Ortho WBAT with walker  -Pain control: Tylenol scheduled every 6 hours, lidocaine patch, tramadol for severe pain   -PT/OT  -Case management  -Fall precautions  -SCDs for DVT prophylaxis only for now 2/2 extraperitoneal hemorrhage           VTE Assessment: [place] VTE Score: 6  VTE Prophylaxis Plan: SCD  Code Status: DNR (A.N.D.)  Estimated Discharge Date: 3/2/2024  Disposition Planning: pending improvement      Erich Caballero MD  3/1/2024

## 2024-03-01 NOTE — ASSESSMENT & PLAN NOTE
Presenting after a fall.  CT pelvis shows acute nondisplaced right superior pubic ramus fracture with right sidewall/extraperitoneal hemorrhage.  Ortho was consulted and saw patient in ED.    -Per Ortho WBAT with walker  -Pain control: Tylenol scheduled every 6 hours, lidocaine patch, tramadol for severe pain   -PT/OT  -Case management  -Fall precautions  -SCDs for DVT prophylaxis only for now 2/2 extraperitoneal hemorrhage    Stable for dc- awaiting placement to [facility]

## 2024-03-01 NOTE — PLAN OF CARE
Problem: Adult Inpatient Plan of Care  Goal: Plan of Care Review  Outcome: Progressing  Flowsheets (Taken 3/1/2024 1501)  Progress: improving  Outcome Evaluation: OT eval complete  Plan of Care Reviewed With: patient

## 2024-03-01 NOTE — CONSULTS
Nutrition Note    Nutrition Status Classification: Mild nutritional compromise     Clinical Course: Patient is a 89 y.o. female who was admitted on 2/29/2024 with a diagnosis of Syncope and collapse [R55]  Fall, initial encounter [W19.XXXA]  Fracture of right superior pubic ramus, closed, initial encounter (CMS/[facility]) [S32.511A]  Closed fracture of ramus of right pubis, initial encounter (CMS/[facility]) [S32.591A].   History reviewed. No pertinent past medical history.  History reviewed. No pertinent surgical history.    Nutrition Interventions/Recommendations:   1. Continue cardiac diet as tolerated    - monitor and document % intake   2. Labs/lytes - monitor and treat prn   3. Weekly weights   4. Monitor GI function   - LBM unknown          Dietary Orders   (From admission, onward)             Start     Ordered    02/29/24 2054  Adult Diet Regular; Cardiac (Low Sodium/Low Fat); RD/LDN may adjust order  Diet effective now        References:    IDDSI Diet reference   Question Answer Comment   Diet Texture Regular    Other Restriction(s): Cardiac (Low Sodium/Low Fat)    Delegation of Authority. Diet orders written by PA/CRRadha may not be adjusted by RD/LDNs. RD/LDN may adjust order        02/29/24 2053              Reason for Assessment  Reason For Assessment: identified at risk by screening criteria, per organizational policy (MST >/=2)  Diagnosis:  (fx of R superior pubic ramus)    [facility] Nutrition Screen Tool  Has patient lost weight without trying?: 2-->Unsure  Has patient been eating poorly due to decreased appetite?: 0-->No  [facility] Nutrition Screen Score: 2    Nutrition/Diet History  Typical Food/Fluid Intake: From [facility]  Diet Prior to Admission: 3 meals/day  Appetite Prior to Admission: Good-50-75%  Supplemental Drinks/Foods/Additives: none  Food Allergies: no known food allergies    Physical Findings  Overall Physical Appearance: overweight  Gastrointestinal:  (denies GI distress)  Skin:  (laceration to head)      "    Nutrition Order  Nutrition Order: meets nutritional requirements  Nutrition Order Comments: cardiac  Current TF/TPN Regimen: none    Anthropometrics  Height: 154.9 cm (5' 1\")    Admit Weight  Admit Weight Method: measured  Admit Weight: 76.2 kg (167 lb 15.9 oz)    Current Weight  Weight Method: Bed scale  Weight: 76.2 kg (168 lb)    Ideal Body Weight (IBW)  Ideal Body Weight (IBW) (kg): 48.15  % Ideal Body Weight: 158.28    Usual Body Weight (UBW)  Usual Body Weight: 68 kg (150 lb)  Weight Loss: no weight change    Body Mass Index (BMI)  BMI (Calculated): 31.8  BMI Assessment: 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         Labs/Procedures/Meds  Lab Results Reviewed: reviewed, pertinent      Results from last 7 days   Lab Units 03/01/24  0632 02/29/24  1600   SODIUM mEQ/L 138 139   POTASSIUM mEQ/L 3.8 3.9   CHLORIDE mEQ/L 103 103   CO2 mEQ/L 27 26   BUN mg/dL 21 27*   CREATININE mg/dL 0.7 0.8   GLUCOSE mg/dL 126* 111*   CALCIUM mg/dL 9.2 9.3      Results from last 7 days   Lab Units 02/29/24  1600   ALK PHOS IU/L 77   BILIRUBIN TOTAL mg/dL 1.1   ALBUMIN g/dL 4.2   ALT IU/L 14   AST IU/L 16     No results found for: \"HGBA1C\"  No results found for: \"NCSMBJBM06\"  Lab Results   Component Value Date    CALCIUM 9.2 03/01/2024     Results from last 7 days   Lab Units 03/01/24  0632 02/29/24  1600   WBC K/uL 10.10 11.83*   HEMOGLOBIN g/dL 12.8 14.0   HEMATOCRIT % 38.5 42.4   PLATELETS K/uL 311 321      Diagnostic Tests/Procedures  Diagnostic Test/Procedure Reviewed: reviewed, pertinent    Medications  Pertinent Medications Reviewed: reviewed, pertinent  Pertinent Medications Comments: amlodipine, synthroid, pravastatin  • acetaminophen  650 mg oral q6h MATY   • amLODIPine  2.5 mg oral Daily   • [Provider Managed Hold] bethanechol  10 mg oral TID   • levothyroxine  88 mcg oral Daily (6:30a)   • lidocaine  1 patch Topical Daily   • pravastatin  20 mg oral Nightly       Weights (last 7 days)     Date/Time Weight    03/01/24 1232 76.2 " kg (168 lb)    03/01/24 00:14:42 76.5 kg (168 lb 10.4 oz)          Clinical Comments:  89 y.o. female admitted 2/29/2024 for fracture of right syperior pubic ramus, screened for MST >/=2.  PMH includes HTN, HLD, hypothyroidism, hyponatremia, and hard of hearing. Presented with fall resulting in pubis fracture, seen by ortho.  On cardiac diet.  Chart reviewed and patient seen at bedside.  Patient reports she typically eats 3 meals/day but is not as interested in food. Denies skipping meals, but states portion depends on what food is offered at meal.  No po intake documented in flowsheet during admission.  Does not use ONS at baseline. Denies c/o n/v/d/c, LBM unknown.  Patient reports UBW of 150 lb and denies recent weight changes. CBW in chart elevated compared to stated; no additional body weight in chart to assess. No nutrition questions/concerns at this time.  Labs/meds reviewed.  Will continue to follow per policy.     Date: 03/01/24  Signature: TWILA Melendez

## 2024-03-02 LAB
BASOPHILS # BLD: 0.05 K/UL (ref 0.01–0.1)
BASOPHILS NFR BLD: 0.5 %
DIFFERENTIAL METHOD BLD: ABNORMAL
EOSINOPHIL # BLD: 0.37 K/UL (ref 0.04–0.36)
EOSINOPHIL NFR BLD: 3.9 %
ERYTHROCYTE [DISTWIDTH] IN BLOOD BY AUTOMATED COUNT: 14 % (ref 11.7–14.4)
HCT VFR BLD AUTO: 36.7 % (ref 35–45)
HGB BLD-MCNC: 12 G/DL (ref 11.8–15.7)
IMM GRANULOCYTES # BLD AUTO: 0.03 K/UL (ref 0–0.08)
IMM GRANULOCYTES NFR BLD AUTO: 0.3 %
LYMPHOCYTES # BLD: 1.19 K/UL (ref 1.2–3.5)
LYMPHOCYTES NFR BLD: 12.6 %
MCH RBC QN AUTO: 28.5 PG (ref 28–33.2)
MCHC RBC AUTO-ENTMCNC: 32.7 G/DL (ref 32.2–35.5)
MCV RBC AUTO: 87.2 FL (ref 83–98)
MONOCYTES # BLD: 0.43 K/UL (ref 0.28–0.8)
MONOCYTES NFR BLD: 4.6 %
NEUTROPHILS # BLD: 7.36 K/UL (ref 1.7–7)
NEUTS SEG NFR BLD: 78.1 %
NRBC BLD-RTO: 0 %
PDW BLD AUTO: 10.3 FL (ref 9.4–12.3)
PLATELET # BLD AUTO: 243 K/UL (ref 150–369)
RBC # BLD AUTO: 4.21 M/UL (ref 3.93–5.22)
WBC # BLD AUTO: 9.43 K/UL (ref 3.8–10.5)

## 2024-03-02 PROCEDURE — 97535 SELF CARE MNGMENT TRAINING: CPT | Mod: GO

## 2024-03-02 PROCEDURE — 85025 COMPLETE CBC W/AUTO DIFF WBC: CPT | Performed by: STUDENT IN AN ORGANIZED HEALTH CARE EDUCATION/TRAINING PROGRAM

## 2024-03-02 PROCEDURE — 97530 THERAPEUTIC ACTIVITIES: CPT | Mod: GO

## 2024-03-02 PROCEDURE — 63700000 HC SELF-ADMINISTRABLE DRUG: Performed by: SPEECH-LANGUAGE PATHOLOGIST

## 2024-03-02 PROCEDURE — 97530 THERAPEUTIC ACTIVITIES: CPT | Mod: GP

## 2024-03-02 PROCEDURE — 21400000 HC ROOM AND CARE CCU/INTERMEDIATE

## 2024-03-02 PROCEDURE — 63700000 HC SELF-ADMINISTRABLE DRUG: Performed by: HOSPITALIST

## 2024-03-02 PROCEDURE — 36415 COLL VENOUS BLD VENIPUNCTURE: CPT | Performed by: STUDENT IN AN ORGANIZED HEALTH CARE EDUCATION/TRAINING PROGRAM

## 2024-03-02 PROCEDURE — 99233 SBSQ HOSP IP/OBS HIGH 50: CPT | Performed by: STUDENT IN AN ORGANIZED HEALTH CARE EDUCATION/TRAINING PROGRAM

## 2024-03-02 RX ADMIN — LEVOTHYROXINE SODIUM 88 MCG: 0.09 TABLET ORAL at 06:19

## 2024-03-02 RX ADMIN — ACETAMINOPHEN 650 MG: 325 TABLET, FILM COATED ORAL at 12:22

## 2024-03-02 RX ADMIN — LIDOCAINE 1 PATCH: 4 PATCH TOPICAL at 09:23

## 2024-03-02 RX ADMIN — PRAVASTATIN SODIUM 20 MG: 20 TABLET ORAL at 21:28

## 2024-03-02 RX ADMIN — AMLODIPINE BESYLATE 2.5 MG: 2.5 TABLET ORAL at 09:22

## 2024-03-02 RX ADMIN — ACETAMINOPHEN 650 MG: 325 TABLET, FILM COATED ORAL at 06:19

## 2024-03-02 RX ADMIN — ACETAMINOPHEN 650 MG: 325 TABLET, FILM COATED ORAL at 17:53

## 2024-03-02 ASSESSMENT — COGNITIVE AND FUNCTIONAL STATUS - GENERAL
CLIMB 3 TO 5 STEPS WITH RAILING: 1 - TOTAL
MOVING TO AND FROM BED TO CHAIR: 2 - A LOT
MOVING TO AND FROM BED TO CHAIR: 2 - A LOT
CLIMB 3 TO 5 STEPS WITH RAILING: 1 - TOTAL
TOILETING: 2 - A LOT
DRESSING REGULAR UPPER BODY CLOTHING: 3 - A LITTLE
WALKING IN HOSPITAL ROOM: 1 - TOTAL
STANDING UP FROM CHAIR USING ARMS: 2 - A LOT
HELP NEEDED FOR BATHING: 2 - A LOT
DRESSING REGULAR LOWER BODY CLOTHING: 1 - TOTAL
WALKING IN HOSPITAL ROOM: 1 - TOTAL
AFFECT: WFL
AFFECT: WFL
EATING MEALS: 3 - A LITTLE
HELP NEEDED FOR PERSONAL GROOMING: 3 - A LITTLE
STANDING UP FROM CHAIR USING ARMS: 3 - A LITTLE

## 2024-03-02 NOTE — PROGRESS NOTES
Occupational Therapy -  Daily Treatment/Progress Note     Patient: Sofie Stewart  Location: [facility] 3 Main 0329  MRN: [id number]  Today's date: 3/2/2024    HISTORY OF PRESENT ILLNESS     Sofie is a 89 y.o. female admitted on 2/29/2024 with Syncope and collapse [R55]  Fall, initial encounter [W19.XXXA]  Fracture of right superior pubic ramus, closed, initial encounter (CMS/[facility]) [S32.511A]  Closed fracture of ramus of right pubis, initial encounter (CMS/[facility]) [S32.591A]. Principal problem is Fracture of right superior pubic ramus, closed, initial encounter (CMS/[facility]).    Past Medical History  Sofie has no past medical history on file.    History of Present Illness   Sofie Stewart is a 89 y.o. female with a past medical history of HTN, hyponatremia, hypothyroid, HLD who presents following a fall earlier today. CT head negative, CT C-spine negative, chest x-ray shows 9 mm nodular density right midlung, patchy opacities in the right upper lung.   CT pelvis shows acute nondisplaced right superior pubic ramus fracture with right sidewall/extraperitoneal hemorrhage. Per Ortho WBAT with walker    PRIOR LEVEL OF FUNCTION AND LIVING ENVIRONMENT     Prior Level of Function    Flowsheet Row Most Recent Value   Dominant Hand right   Ambulation assistive equipment   Transferring assistive equipment   Toileting assistive equipment   Bathing assistive equipment   Dressing independent   Eating independent   IADLs assistive person   Driving/Transportation friends/family   Communication understands/communicates without difficulty   Assistive Device Currently Used at Home walker, front-wheeled        Prior Living Environment    Flowsheet Row Most Recent Value   People in Home alone   Current Living Arrangements assisted living facility        Occupational Profile    Flowsheet Row Most Recent Value   Successful Occupations [employer] Retired   Occupational History/Life Experiences Resident of [facility]   Environmental Supports and  Barriers Supportive Community        VITALS AND PAIN     OT Vitals    Date/Time Pulse HR Source SpO2 Pt Activity O2 Therapy O2 Del Method O2 Flow Rate BP BP Location BP Method Pt Position [facility]   03/02/24 1345 85 Monitor 94 % At rest Supplemental oxygen Nasal cannula 2 L/min 145/67 Right upper arm Automatic Sitting RK      OT Pain    Date/Time Pain Type Rating: Rest Rating: Activity [facility]   03/02/24 1345 Pain Assessment 10 10 RK           Objective   OBJECTIVE     Start time:  1342  End time:  1414  Session Length: 32 min  Mode of Treatment: co-treatment, occupational therapy (Priority)    General Observations  Patient received supine, in bed. She was agreeable to therapy, no issues or concerns identified by nurse prior to session. Engaged throughout session/ good sence of humor    Precautions: fall, cardiac, weight bearing  Extremity Weight-Bearing Status: left lower extremity, right lower extremity  Left LE Weight-Bearing Status: weight-bearing as tolerated (WBAT)  Right LE Weight-Bearing Status: weight-bearing as tolerated (WBAT)    Limitations/Impairments: safety/cognitive, hearing   Services  Do You Speak a Language Other Than English at Home?: no      OT Eval and Treat - 03/02/24 1342        Cognition    Orientation Status oriented to;person;place     Affect/Mental Status WFL     Behavioral Issues difficulty managing stress     Follows Commands follows one-step commands;75-90% accuracy;increased processing time needed;verbal cues/prompting required;physical/tactile prompts required     Cognitive Function safety deficit;executive function deficit     Attention Deficit minimal deficit;focused/sustained attention;requires cues/redirection to task     Executive Function Deficit moderate deficit;insight/awareness of deficits;information processing;problem-solving/reasoning;self-monitoring/self-correction     Comment, Executive Function USe of Speak to Text at Bedside Helpful / Overwhelms quickly benefits  from slower pace     Safety Deficit moderate deficit;ability to follow commands;insight into deficits/self-awareness;problem-solving;safety precautions awareness     Cognitive Interventions/Strategies occupation/activity based interventions        Bed Mobility    Bed Mobility Activities left;sit to supine;supine to sit;rolling;right     [place] close supervision;moderate assist (50-74% patient effort);1 person assist;maximum assist (25-49% patient effort)     Safety/Cues increased time to complete;minimal;verbal cues;tactile cues;hand placement;preparatory posture;proper use of assistive device;technique     Assistive Device bed rails;head of bed elevated;draw sheet     Comment Increased time for Supine to Sit Transfer + Effort Sat EOB / Mod Assit x 1 Sit to supine with BLE / Rolling L/R Max Assist x 1 Bedrails difficulty clearing mattress for incont care        Mobility Belt    Mobility Belt Used for All Out of Bed Activity no     Reason Mobility Belt Not Used patient refused        Sit/Stand Transfer    Surface edge of bed     [place] minimum assist (75% or more patient effort);1 person assist     Safety/Cues increased time to complete;moderate;verbal cues;tactile cues;hand placement;preparatory posture;sequencing;technique     Assistive Device walker, front-wheeled     Transfer Comments Increased Pelvic pain Refused Physical support from Clinician Weight shift unsuccessful / Unable to progress to Recliner due to Bathroom needs Insisit upon returning to bed for Bedpan use Declined Commode as availiable        Toileting    Tasks adjust/manage clothing;perform bladder hygiene     [place] moderate assist (50-74% patient effort);maximum assist (25-49% patient effort)     Safety/Cues compensatory techniques     Position supine     Setup Assistance change pad/brief;obtain supplies     Adaptive Equipment bedpan     Comment + Void Required extended time for rolling and mobility associated with bedpan use  Max Assist with Pads and Incont Products        Balance    Static Sitting Balance WFL;unsupported;sitting, edge of bed     Dynamic Sitting Balance mild impairment;unsupported;sitting, edge of bed     Sit to Stand Dynamic Balance mild impairment;supported;standing     Static Standing Balance mild impairment;supported;standing     Dynamic Standing Balance severe impairment;supported;standing     Balance Interventions occupation based/functional task     Comment, Balance RW for BUE Support        Impairments/Safety Issues    Impairments Affecting Function balance;cognition;endurance/activity tolerance;pain;range of motion (ROM);strength     Cognitive Impairments, Safety/Performance attention;insight into deficits/self-awareness;judgment;impulsivity;problem-solving/reasoning;safety precaution awareness     Functional Endurance Fair - Pain is a limiting factor                                Education Documentation  Self-Care, taught by Rachel Holm, OT at 3/2/2024  3:11 PM.  Learner: Patient  Readiness: Acceptance  Method: Demonstration, Explanation  Response: Verbalizes Understanding, Needs Reinforcement  Comment: OT POC Compensatory Strategies for Meeting Personal Care Needs        Session Outcome  Patient reclined, in bed at end of session, bed alarm on, call light in reach, all needs met, personal items in reach. Nursing notified about patient's response to therapy/activity, patient's performance, and patient's position.    AM-PAC™ - ADL (Current Function)     Putting on/taking off regular lower body clothing 1 - Total   Bathing 2 - A Lot   Toileting 2 - A Lot   Putting on/taking off regular upper body clothing 3 - A Little   Help for taking care of personal grooming 3 - A Little   Eating meals 3 - A Little   AM-PAC™ ADL Score 14      ASSESSMENT AND PLAN     Progress Summary  OT Evaluation Complete [facility] 14 Reflects Need for Min Assist for Upper body BADL and Mod to Max Assit for Lower Extermity BADL  While Functional Bed Mobility is achieved with Min to Mod Assit x 1 due to Pain, Diminished Endurance, Functional Balance, Strength and ROM coupled with Mild Cognitive Challenges/ Continue Acute Care OT / Recommend SNF for continued Rehab when Medically Appropriate    Patient/Family Therapy Goal Statement: Return home following rehab    OT Plan    Flowsheet Row Most Recent Value   Rehab Potential fair, will monitor progress closely at 03/02/2024 1342   Therapy Frequency 4 times/wk at 03/02/2024 1342   Planned Therapy Interventions activity tolerance training, BADL retraining, occupation/activity based interventions, patient/caregiver education/training, functional balance retraining, strengthening exercise, transfer/mobility retraining at 03/02/2024 1342          OT Discharge Recommendations    Flowsheet Row Most Recent Value   OT Recommended Discharge Disposition skilled nursing facility at 03/02/2024 1342   Anticipated Equipment Needs if Discharged Home (OT) none at 03/02/2024 1342               OT Goals    Flowsheet Row Most Recent Value   Bed Mobility Goal 1    Activity/Assistive Device bed mobility activities, all at 03/01/2024 1046   [place] minimum assist (75% or more patient effort) at 03/01/2024 1046   Time Frame by discharge at 03/01/2024 1046   Progress/Outcome goal ongoing at 03/01/2024 1046   Transfer Goal 1    Activity/Assistive Device sit-to-stand/stand-to-sit, bed-to-chair/chair-to-bed, toilet, stand pivot at 03/01/2024 1046   [place] supervision required at 03/01/2024 1046   Time Frame by discharge at 03/01/2024 1046   Progress/Outcome goal ongoing at 03/01/2024 1046   Dressing Goal 1    Activity/Adaptive Equipment dressing skills, all at 03/01/2024 1046   [place] supervision required at 03/01/2024 1046   Time Frame by discharge at 03/01/2024 1046   Progress/Outcome goal ongoing at 03/01/2024 1046   Toileting Goal 1    Activity/Assistive Device toileting skills, all at 03/01/2024 1046    Time Frame by discharge at 03/01/2024 1046   Progress/Outcome goal ongoing at 03/01/2024 1043

## 2024-03-02 NOTE — PLAN OF CARE
Plan of Care Review  Plan of Care Reviewed With: patient  Progress: no change  Outcome Evaluation: Patient very hard of hearing and forgetful. SR on monitor. Reports no pain at rest but R hip pain with movement. Scheduled Tylenol, lidocaine patch, and  PRN Tramadol given. Bed alarm on.

## 2024-03-02 NOTE — PROGRESS NOTES
Echo is normal.  No events tele.    BP reasonable. No plan for surgery.    I suspect this was a fall and doubt syncope.    We are signing off.    Hay Beltre MD FACC

## 2024-03-02 NOTE — PROGRESS NOTES
Physical Therapy -  Daily Treatment/Progress Note     Patient: Sofie Stewart  Location: [facility] 3 Main 0329  MRN: [id number]  Today's date: 3/2/2024    HISTORY OF PRESENT ILLNESS     Sofie is a 89 y.o. female admitted on 2/29/2024 with Syncope and collapse [R55]  Fall, initial encounter [W19.XXXA]  Fracture of right superior pubic ramus, closed, initial encounter (CMS/[facility]) [S32.511A]  Closed fracture of ramus of right pubis, initial encounter (CMS/[facility]) [S32.591A]. Principal problem is Fracture of right superior pubic ramus, closed, initial encounter (CMS/[facility]).    Past Medical History  Sofie has no past medical history on file.    History of Present Illness   Sofie Stewart is a 89 y.o. female with a past medical history of HTN, hyponatremia, hypothyroid, HLD who presents following a fall earlier today. CT head negative, CT C-spine negative, chest x-ray shows 9 mm nodular density right midlung, patchy opacities in the right upper lung.   CT pelvis shows acute nondisplaced right superior pubic ramus fracture with right sidewall/extraperitoneal hemorrhage. Per Ortho WBAT with walker    PRIOR LEVEL OF FUNCTION AND LIVING ENVIRONMENT     Prior Level of Function    Flowsheet Row Most Recent Value   Dominant Hand right   Ambulation assistive equipment   Transferring assistive equipment   Toileting assistive equipment   Bathing assistive equipment   Dressing independent   Eating independent   IADLs assistive person   Driving/Transportation friends/family   Communication understands/communicates without difficulty   Assistive Device Currently Used at Home walker, front-wheeled        Prior Living Environment    Flowsheet Row Most Recent Value   People in Home alone   Current Living Arrangements assisted living facility        VITALS AND PAIN     PT Vitals    Date/Time Pulse HR Source SpO2 Pt Activity O2 Therapy O2 Del Method O2 Flow Rate BP BP Location BP Method Pt Position [facility]   03/02/24 1345 85 Monitor 94 % At  rest Supplemental oxygen Nasal cannula 2 L/min 145/67 Right upper arm Automatic Sitting RK      PT Pain    Date/Time Pain Type Rating: Rest Rating: Activity [facility]   03/02/24 1345 Pain Assessment 10 10 RK           Objective   OBJECTIVE     Start time:  1341  End time:  1413  Session Length: 32 min  Mode of Treatment: co-treatment, physical therapy (for expediated DC. PT focus on mobility)    General Observations  Patient received supine, in bed. She was no issues or concerns identified by nurse prior to session, agreeable to therapy.      Precautions: fall, cardiac, weight bearing  Extremity Weight-Bearing Status: left lower extremity, right lower extremity  Left LE Weight-Bearing Status: weight-bearing as tolerated (WBAT)  Right LE Weight-Bearing Status: weight-bearing as tolerated (WBAT)    Limitations/Impairments: safety/cognitive, hearing   Services  Do You Speak a Language Other Than English at Home?: no      PT Eval and Treat - 03/02/24 1341        Cognition    Orientation Status oriented to;person;place     Affect/Mental Status WFL     Follows Commands follows one-step commands;75-90% accuracy;increased processing time needed;repetition of directions required;verbal cues/prompting required;delayed response/completion     Cognitive Function safety deficit;executive function deficit     Executive Function Deficit moderate deficit;insight/awareness of deficits;information processing;self-monitoring/self-correction     Safety Deficit moderate deficit;insight into deficits/self-awareness;judgment;awareness of need for assistance     Comment, Cognition Pt pleasantly confused and semi-cooperative t/o PT session. Decreased insight into deficits and frequently refusing therapists assistance for mobility despite severe pain and LE weakness        Bed Mobility    Bed Mobility Activities left;supine to sit;sit to supine;right;rolling     [place] close supervision;moderate assist (50-74% patient effort);1  person assist     Safety/Cues increased time to complete;technique;sequencing;verbal cues     Assistive Device bed rails;head of bed elevated     Comment C SUP and increased time + effort for supine to sit EOB. MOD Ax1 for sit to supine for LE management. Rolling L/R w/ max Ax1 multiple reps ea. side using bedrails for hygiene and repositioning        Mobility Belt    Mobility Belt Used for All Out of Bed Activity no     Reason Mobility Belt Not Used patient refused        Sit/Stand Transfer    Surface edge of bed     [place] minimum assist (75% or more patient effort);1 person assist     Safety/Cues increased time to complete;proper use of assistive device;technique;preparatory posture;hand placement     Assistive Device walker, front-wheeled     Transfer Comments Slow to rise and requires inc time and effort to completion. Refuses therapists assistance for completion of transfer. Attempts to weight shift while standing however unable to tolerate 2/2 increased R pelvic pain        Gait Training    [place], Gait unable to assess     Comment (Gait/Stairs) Pt unable to tolerate lateral weight shifts and advance LEs for steps at this time 2/2 increased pain        Balance    Static Sitting Balance WFL;sitting, edge of bed     Dynamic Sitting Balance mild impairment;sitting, edge of bed     Sit to Stand Dynamic Balance mild impairment;supported     Static Standing Balance mild impairment     Dynamic Standing Balance severe impairment;supported     Comment, Balance w/ RW        Impairments/Safety Issues    Impairments Affecting Function balance;cognition;endurance/activity tolerance;pain;postural/trunk control;range of motion (ROM);strength     Cognitive Impairments, Safety/Performance judgment;sequencing abilities;problem-solving/reasoning;awareness, need for assistance;insight into deficits/self-awareness     Functional Endurance Fair (-) limited by pain                                Education  Documentation  Joint Mobility/Strength, taught by Leona Dutta PT at 3/2/2024  2:43 PM.  Learner: Patient  Readiness: Acceptance  Method: Explanation  Response: Verbalizes Understanding  Comment: pt educated on PT role, PT POC, proper RW management, safe functional mobility techniques, self-pacing, and DC recs    Energy Conservation, taught by Leona Dutta PT at 3/2/2024  2:43 PM.  Learner: Patient  Readiness: Acceptance  Method: Explanation  Response: Verbalizes Understanding  Comment: pt educated on PT role, PT POC, proper RW management, safe functional mobility techniques, self-pacing, and DC recs    Assistive/Adaptive Devices, taught by Loena Dutta PT at 3/2/2024  2:43 PM.  Learner: Patient  Readiness: Acceptance  Method: Explanation  Response: Verbalizes Understanding  Comment: pt educated on PT role, PT POC, proper RW management, safe functional mobility techniques, self-pacing, and DC recs        Session Outcome  Patient reclined, in bed at end of session, bed alarm on, all needs met, call light in reach, personal items in reach. Nursing notified about patient's performance and patient's position.    AM-PAC™ - Mobility (Current Function)     Turning form your back to your side while in flat bed without using bedrails 2 - A Lot   Moving from lying on your back to sitting on the side of a flat bed without using bedrails 2 - A Lot   Moving to and from a bed to a chair 2 - A Lot   Standing up from a chair using your arms 3 - A Little   To walk in a hospital room 1 - Total   Climbing 3-5 steps with a railing 1 - Total   AM-PAC™ Mobility Score 11      ASSESSMENT AND PLAN     Progress Summary  Pt seen for f/u PT treatment this date. [facility] 11/24. Pt requires C SUP for supine to sit EOB w/ HOB elevated, MAX Ax1 for rolling L/R, MOD A for sit to supine for LE management and min A for STS transfer w/ RW. Attempted to take lateral steps along the EOB however pt unable to weight shift onto RLE for limb  advancement 2/2 severe R pelvis pain. Increased time spent t/o PT session for hygiene following urinary incontinence. Recommend SNF when medically stable for DC    Patient/Family Therapy Goals Statement: to have less pain    PT Plan    Flowsheet Row Most Recent Value   Rehab Potential good, to achieve stated therapy goals at 03/02/2024 1341   Therapy Frequency 5 times/wk at 03/02/2024 1341   Planned Therapy Interventions balance training, bed mobility training, gait training, home exercise program, patient/family education, postural re-education, ROM (range of motion), strengthening, transfer training, wheelchair management/propulsion training at 03/02/2024 1341          PT Discharge Recommendations    Flowsheet Row Most Recent Value   PT Recommended Discharge Disposition skilled nursing facility at 03/02/2024 1341   Anticipated Equipment Needs if Discharged Home (PT) wheelchair, commode, 3-in-1, wheelchair cushion at 03/02/2024 1341               PT Goals    Flowsheet Row Most Recent Value   Bed Mobility Goal 1    Activity/Assistive Device sit to supine/supine to sit at 03/01/2024 1047   [place] supervision required at 03/01/2024 1047   Time Frame by discharge at 03/01/2024 1047   Progress/Outcome goal ongoing at 03/01/2024 1047   Transfer Goal 1    Activity/Assistive Device sit-to-stand/stand-to-sit, walker, front-wheeled at 03/01/2024 1047   [place] supervision required at 03/01/2024 1047   Time Frame by discharge at 03/01/2024 1047   Progress/Outcome goal ongoing at 03/01/2024 1047   Gait Training Goal 1    Activity/Assistive Device gait (walking locomotion), walker, front-wheeled at 03/01/2024 1047   [place] minimum assist (75% or more patient effort) at 03/01/2024 1047   Distance 30 at 03/01/2024 1047   Time Frame by discharge at 03/01/2024 1047   Progress/Outcome goal ongoing at 03/01/2024 1047

## 2024-03-02 NOTE — PLAN OF CARE
Plan of Care Review  Plan of Care Reviewed With: patient  Progress: improving  Outcome Evaluation: Pt aaox2-3. SR on monitor. VSS. Scheduled tylenol administered as ordered. Using bedpan at request. Bed alarm set and call bell within reach.

## 2024-03-02 NOTE — PROGRESS NOTES
[facility] Medicine Service -  Daily Progress Note       SUBJECTIVE   Interval History: Has mild pain with movement. [place]. Tolerating diet.      OBJECTIVE      Vital signs in last 24 hours:  Temp:  [36.4 °C (97.6 °F)-36.7 °C (98.1 °F)] 36.5 °C (97.7 °F)  Heart Rate:  [64-77] 66  Resp:  [18] 18  BP: (135-159)/(65-72) 155/65    Intake/Output Summary (Last 24 hours) at 3/2/2024 1336  Last data filed at 3/2/2024 0938  Gross per 24 hour   Intake 1080 ml   Output 750 ml   Net 330 ml       PHYSICAL EXAMINATION      Gen: Appears stated age, not in any distress, [place]  Head: atraumatic, normocephalic  Eyes: PERRLA, EOMI, no pallor  Neck: supple, no Lymph nodes, no Thyromegaly, no JVD  CVS: RRR, No M/G, no JVD  Pulm: CTA B/l, no wheezing or rhonchi, no rales  Abd: Soft, NT, ND, normal bowel sounds  Extremities:no edema, no cyanosis   MSK: no DJD, no joint swellings, no joint tenderness   Neuro: Awake     LINES, CATHETERS, DRAINS, AIRWAYS, AND WOUNDS   Lines, Drains, Airways, Wounds:  Peripheral IV (Adult) 02/29/24 Right Antecubital (Active)   Number of days: 1       Peripheral IV (Adult) 03/01/24 Anterior;Left;Proximal Forearm (Active)   Number of days: 0       Wound Laceration Anterior;Upper Head (Active)   Number of days: 0       Comments:      LABS / IMAGING / TELE      Labs  Results from last 7 days   Lab Units 03/02/24  0258   WBC K/uL 9.43   HEMOGLOBIN g/dL 12.0   HEMATOCRIT % 36.7   PLATELETS K/uL 243     Results from last 7 days   Lab Units 03/01/24  0632   SODIUM mEQ/L 138   POTASSIUM mEQ/L 3.8   CHLORIDE mEQ/L 103   CO2 mEQ/L 27   BUN mg/dL 21   CREATININE mg/dL 0.7   GLUCOSE mg/dL 126*   CALCIUM mg/dL 9.2         Micro  Microbiology Results     ** No results found for the last 720 hours. **          Imaging  Transthoracic echo (TTE) complete    Result Date: 3/1/2024  Narrative: 1.  Normal LV size and function with ejection fraction of 65 to 70%. 2.  No regional wall motion abnormalities identified . 3.  Great I  diastolic dysfunction . 4.  No significant valvular heart disease seen.     CT ANGIOGRAPHY CHEST PULMONARY EMBOLISM WITH IV CONTRAST    Result Date: 2/29/2024  Narrative: CLINICAL HISTORY: Dizziness and left hip pain. COMMENT: CT angiogram examination of the chest was performed using contiguous 3 mm transaxial sections. Reformats were made in the coronal and sagittal planes by the technologist. Images were acquired after the uneventful administration of 100 cc of Isovue-370 intravenously. 3D MIP and/or volume rendered image reconstruction performed and reviewed. CT DOSE:  One or more dose reduction techniques (e.g. automated exposure control, adjustment of the mA and/or kV according to patient size, use of iterative reconstruction technique) utilized for this examination. Comparison Studies: None. Lower Neck: Thyroid nodules, largest in the right lobe measuring 1.6 cm Axilla: Negative. Lung parenchyma: Partial atelectasis of the right upper lobe and both lower lobes. Pleura: Small right pleural effusion.  No pneumothorax. Mediastinum and mariel: No evidence of pulmonary emboli.  Atherosclerotic calcifications of the thoracic aorta. Heart and pericardium: Cardiomegaly.  No pericardial thickening or significant effusion. Chest wall: Large hiatal hernia. Bones: Multilevel thoracic spine degenerative changes.  No suspicious osteolytic or osteoblastic lesions.  Glenohumeral joint degenerative changes. Osteochondral loose bodies in the subcoracoid region on the right.  S-shaped thoracolumbar scoliosis. Visualized portions of Upper Abdomen: Negative.     Impression: IMPRESSION: 1.  No evidence of pulmonary emboli. 2.  Small right pleural effusion.     CT PELVIS WITHOUT IV CONTRAST    Result Date: 2/29/2024  Narrative: CLINICAL HISTORY:    Rule out occult hip fracture COMPARISON:    None COMMENT: Technique: CT of the pelvis without contrast: CT DOSE:  One or more dose reduction techniques (e.g. automated exposure control,  adjustment of the mA and/or kV according to patient size, use of iterative reconstruction technique) utilized for this examination. COMMENT: Bones: Osteopenia. Acute nondisplaced right superior pubic ramus fracture. Joints: No osseous malalignment. Severe osteoarthritis of the right hip. Mild left hip osteoarthritis Soft Tissues: There is right extra peritoneal hematoma noted in the space of Retzius/right pelvic sidewall. A right adnexal cyst measuring 3.8 cm     Impression: IMPRESSION: Acute nondisplaced right superior pubic ramus fracture with associated right pelvic sidewall/extraperitoneal hemorrhage.    X-RAY HIP WITH OR WITHOUT PELVIS 2-3 VW RIGHT    Result Date: 2/29/2024  Narrative: CLINICAL HISTORY: Traumatic hip pain post fall TECHNIQUE: AP pelvis. AP and 2 frog leg lateral radiographs right hip. COMPARISON: None available FINDINGS: Diffuse osteopenia. Question fracture of the right superior pubic ramus. Proximal right femur intact. Alignment maintained. Severe degenerative changes right hip. Sacrum obscured by bowel gas.     Impression: IMPRESSION: Possible nondisplaced fracture right superior pubic ramus. This could be better evaluated with CT.     X-RAY CHEST 1 VIEW    Result Date: 2/29/2024  Narrative: CLINICAL HISTORY: Syncope. AP portable view of the chest. COMPARISON: None available. COMMENT: The heart is enlarged. The cardiomediastinal silhouette is unremarkable. The lungs are hyperinflated suggesting COPD. There is a subtle 9 mm nodular density right midlung. Patchy opacities in the right upper lung may be due to infection. There is a hiatal hernia. There are degenerative changes of the spine and shoulders.     Impression: IMPRESSION: 1. 9 mm nodular density right midlung. 2. Patchy opacities in the right upper lung which may be due to infection. 2. Further evaluation with CT chest is recommended.    CT HEAD WITHOUT IV CONTRAST, CT CERVICAL SPINE WITHOUT IV CONTRAST    Result Date:  2/29/2024  Narrative: CLINICAL HISTORY: Status post fall. COMMENT: Contiguous axial CT images of the brain and cervical spine are performed with sagittal and coronal reformatted images. COMPARISON:  None CT DOSE: One or more dose reduction technique (e.g.automated exposure control, adjustment of the kV and/or mA according to the patient's size, use of iterative reconstruction technique) utilized for this examination. CT BRAIN: Generalized cerebral volume loss is noted.  Decrease in density is seen in the periventricular white matter tracts most likely representing chronic small vessel ischemic disease.  No intraparenchymal mass, acute infarcts or hemorrhages are seen.  The ventricles and the cisterns are normal. No extra-axial masses or focal fluid collections are seen. There is no midline shift. Visualized portions of the paranasal sinuses and the mastoid air cells are clear. No acute fractures are seen. CT CERVICAL SPINE: Diffuse osteopenia is noted.  No acute fractures or posttraumatic spondylolisthesis are identified. No soft tissue edema is seen.  Minimal degenerative spondylolisthesis is seen at C3-C4 and C4-C5 levels where the inferior vertebral bodies are anterior to superior vertebral bodies. Degenerative changes are identified most prominent at C4-C5 level where there is mild to moderate disc osteophyte bulge with bilateral uncovertebral and facet joint hypertrophy causing mild central canal stenosis and moderate bilateral neural foraminal stenosis. The sagittal and coronal reformatted images demonstrate normal facet joint alignment. Evaluation of lower cervical spine is limited due bilateral shoulders. Visualized lung apices demonstrate linear opacity in the posterior portion of the right upper lobe possibly attending atelectasis.  Of note, evaluation of the lung parenchyma is somewhat limited secondary to patient motion.     Impression: IMPRESSION: 1. No posttraumatic intracranial abnormality. 2. No  acute cervical spine fractures.  As clinically indicated, MRI of the cervical spine is recommended for further evaluation. 3. Other incidental findings noted under the comment.           ASSESSMENT AND PLAN      HTN (hypertension)  Assessment & Plan  Controlled    - Continue amlodipine    Leukocytosis  Assessment & Plan  -resolved       Hypothyroid  Assessment & Plan  Continue Synthroid    Syncope  Assessment & Plan  Patient presenting after fall at assisted living facility.  Patient states she had a syncopal episode, with LOC.  Caregivers had reported to EMS however it was a mechanical fall.   Patient denied chest pain.  Troponin 10.7--> 13.7     -Continuous telemetry monitoring  -Echo- normal EF, no regional wall motion abnormalities  -Cardiology on board     * Fracture of right superior pubic ramus, closed, initial encounter (CMS/[facility])  Assessment & Plan  Presenting after a fall.  CT pelvis shows acute nondisplaced right superior pubic ramus fracture with right sidewall/extraperitoneal hemorrhage.  Ortho was consulted and saw patient in ED.    -Per Ortho WBAT with walker  -Pain control: Tylenol scheduled every 6 hours, lidocaine patch, tramadol for severe pain   -PT/OT  -Case management  -Fall precautions  -SCDs for DVT prophylaxis only for now 2/2 extraperitoneal hemorrhage    Stable for dc- awaiting placement to [facility]            VTE Assessment: [place] VTE Score: 6  VTE Prophylaxis Plan: SCD  Code Status: DNR (A.N.D.)  Estimated Discharge Date: 3/3/2024  Disposition Planning: pending improvement      Erich Caballero MD  3/2/2024

## 2024-03-02 NOTE — PLAN OF CARE
Care Coordination Discharge Plan Note     Discharge Needs Assessment  Concerns to be Addressed: discharge planning  Current Discharge Risk:      Anticipated Discharge Plan  Anticipated Discharge Disposition: skilled nursing facility       Patient Choice  Offered/Gave Vendor List: yes  Patient's Choice of Community Agency(s): [facility]    Patient and/or patient guardian/advocate was made aware of their right to choose a provider. A list of eligible providers was presented and reviewed with the patient and/or patient guardian/advocate in written and/or verbal form. The list delineates providers in the patient’s desired geographic area who are participating in the Medicare program and/or providers contracted with the patient’s primary insurance. The Medicare list and quality ratings were obtained from the Medicare.gov [medicare.gov] website.    ---------------------------------------------------------------------------------------------------------------------    Interdisciplinary Discharge Plan Review:  Participants:advanced practice provider, pharmacy, physical therapy, physician, nursing, family/lay caregiver, occupational therapy, patient, social work/services    Concerns Comments: LISS called [facility] covering weekend immanuel Mosqeuda #[phone] to discuss discharge. Immanuel Mon would like patient to be seen again by PT/OT due to patients pain level while working with therapy on 03/01/24. LISS asked therapy to meet with patient as priority. [facility] liaison has a bed available on Sunday, 03/03/24. Patient will require an IBC insurance authorization after therapy re-evaluates patient. LISS requested transport for Sunday, 03/03/24 at 15:30 through [facility] #[id number]. Patient will require an OOH DNR form placed in chart. LISS updated patients son, Raymond Stewart. ADDENDUM: Therapy met with patient and notes are in. LISS contacted immanuel Mon to notify and she stated they will complete IBC  Noted pt currently in ER, left vm veronica Martin making aware that pt presently in ER. insurance authorization on Robbin morning. -DI Ross    Discharge Plan:   Disposition/Destination: Skilled Nursing Facility - Other  / Skilled Nursing Facility  Discharge Facility:   Destination - Admitted Since 2/29/2024     Service Provider Selected Services Address Phone Fax Patient Preferred Last Updated    [facility] [address], [place] [address] [phone] -- Gabriele Tejada MSW 3/2/2024 1111       Internal Comment last updated by Gabriele Tejada MSW 3/2/2024 1111    Will need insurance authorization                    Community Resources:      Discharge Transportation:  Is Out of Hospital DNR needed at Discharge: yes  Does patient need discharge transport? Yes

## 2024-03-02 NOTE — PLAN OF CARE
Plan of Care Review  Plan of Care Reviewed With: patient  Progress: no change  Outcome Evaluation: vss pt denies pain until movement will only take tylenol, worked with PT/OT today waiting for insurance auth for [facility] , call bell within reach

## 2024-03-03 LAB
ATRIAL RATE: 99
BASOPHILS # BLD: 0.04 K/UL (ref 0.01–0.1)
BASOPHILS NFR BLD: 0.5 %
DIFFERENTIAL METHOD BLD: ABNORMAL
EOSINOPHIL # BLD: 0.29 K/UL (ref 0.04–0.36)
EOSINOPHIL NFR BLD: 3.3 %
ERYTHROCYTE [DISTWIDTH] IN BLOOD BY AUTOMATED COUNT: 13.7 % (ref 11.7–14.4)
HCT VFR BLD AUTO: 36.3 % (ref 35–45)
HGB BLD-MCNC: 11.9 G/DL (ref 11.8–15.7)
IMM GRANULOCYTES # BLD AUTO: 0.04 K/UL (ref 0–0.08)
IMM GRANULOCYTES NFR BLD AUTO: 0.5 %
LYMPHOCYTES # BLD: 0.94 K/UL (ref 1.2–3.5)
LYMPHOCYTES NFR BLD: 10.8 %
MCH RBC QN AUTO: 28.4 PG (ref 28–33.2)
MCHC RBC AUTO-ENTMCNC: 32.8 G/DL (ref 32.2–35.5)
MCV RBC AUTO: 86.6 FL (ref 83–98)
MONOCYTES # BLD: 0.53 K/UL (ref 0.28–0.8)
MONOCYTES NFR BLD: 6.1 %
NEUTROPHILS # BLD: 6.88 K/UL (ref 1.7–7)
NEUTS SEG NFR BLD: 78.8 %
NRBC BLD-RTO: 0 %
P AXIS: 61
PDW BLD AUTO: 10.2 FL (ref 9.4–12.3)
PLATELET # BLD AUTO: 254 K/UL (ref 150–369)
PR INTERVAL: 154
QRS DURATION: 74
QT INTERVAL: 364
QTC CALCULATION(BAZETT): 467
R AXIS: 16
RBC # BLD AUTO: 4.19 M/UL (ref 3.93–5.22)
T WAVE AXIS: 67
VENTRICULAR RATE: 99
WBC # BLD AUTO: 8.72 K/UL (ref 3.8–10.5)

## 2024-03-03 PROCEDURE — 63700000 HC SELF-ADMINISTRABLE DRUG: Performed by: HOSPITALIST

## 2024-03-03 PROCEDURE — 63700000 HC SELF-ADMINISTRABLE DRUG: Performed by: SPEECH-LANGUAGE PATHOLOGIST

## 2024-03-03 PROCEDURE — 36415 COLL VENOUS BLD VENIPUNCTURE: CPT | Performed by: STUDENT IN AN ORGANIZED HEALTH CARE EDUCATION/TRAINING PROGRAM

## 2024-03-03 PROCEDURE — 93010 ELECTROCARDIOGRAM REPORT: CPT | Performed by: INTERNAL MEDICINE

## 2024-03-03 PROCEDURE — 85025 COMPLETE CBC W/AUTO DIFF WBC: CPT | Performed by: STUDENT IN AN ORGANIZED HEALTH CARE EDUCATION/TRAINING PROGRAM

## 2024-03-03 PROCEDURE — 63700000 HC SELF-ADMINISTRABLE DRUG: Performed by: STUDENT IN AN ORGANIZED HEALTH CARE EDUCATION/TRAINING PROGRAM

## 2024-03-03 PROCEDURE — 21400000 HC ROOM AND CARE CCU/INTERMEDIATE

## 2024-03-03 PROCEDURE — 99232 SBSQ HOSP IP/OBS MODERATE 35: CPT | Performed by: STUDENT IN AN ORGANIZED HEALTH CARE EDUCATION/TRAINING PROGRAM

## 2024-03-03 RX ADMIN — ACETAMINOPHEN 650 MG: 325 TABLET, FILM COATED ORAL at 17:38

## 2024-03-03 RX ADMIN — ACETAMINOPHEN 650 MG: 325 TABLET, FILM COATED ORAL at 12:56

## 2024-03-03 RX ADMIN — LIDOCAINE 1 PATCH: 4 PATCH TOPICAL at 09:40

## 2024-03-03 RX ADMIN — TRAMADOL HYDROCHLORIDE 25 MG: 50 TABLET, COATED ORAL at 12:55

## 2024-03-03 RX ADMIN — ACETAMINOPHEN 650 MG: 325 TABLET, FILM COATED ORAL at 06:17

## 2024-03-03 RX ADMIN — PRAVASTATIN SODIUM 20 MG: 20 TABLET ORAL at 20:31

## 2024-03-03 RX ADMIN — LEVOTHYROXINE SODIUM 88 MCG: 0.09 TABLET ORAL at 06:16

## 2024-03-03 RX ADMIN — AMLODIPINE BESYLATE 2.5 MG: 2.5 TABLET ORAL at 09:40

## 2024-03-03 RX ADMIN — TRAMADOL HYDROCHLORIDE 25 MG: 50 TABLET, COATED ORAL at 20:31

## 2024-03-03 ASSESSMENT — HEART SCORE
AGE: 65+
ECG: NORMAL
HISTORY: MODERATELY SUSPICIOUS
TROPONIN: LESS THAN OR EQUAL TO NORMAL LIMIT
RISK FACTORS: 1-2 RISK FACTORS
HEART SCORE: 4

## 2024-03-03 ASSESSMENT — COGNITIVE AND FUNCTIONAL STATUS - GENERAL
STANDING UP FROM CHAIR USING ARMS: 2 - A LOT
CLIMB 3 TO 5 STEPS WITH RAILING: 1 - TOTAL
MOVING TO AND FROM BED TO CHAIR: 2 - A LOT
WALKING IN HOSPITAL ROOM: 1 - TOTAL

## 2024-03-03 NOTE — PROGRESS NOTES
Hospital Medicine Service -  Daily Progress Note       SUBJECTIVE   Interval History: No new symptoms. Her pain is controlled.   Awaiting placement      OBJECTIVE      Vital signs in last 24 hours:  Temp:  [36.4 °C (97.6 °F)-36.9 °C (98.5 °F)] 36.6 °C (97.9 °F)  Heart Rate:  [75-87] 75  Resp:  [16-19] 18  BP: (133-178)/(63-84) 138/73    Intake/Output Summary (Last 24 hours) at 3/3/2024 1323  Last data filed at 3/3/2024 1130  Gross per 24 hour   Intake 240 ml   Output 1675 ml   Net -1435 ml       PHYSICAL EXAMINATION      Gen: Appears stated age, not in any distress, [place]  Head: atraumatic, normocephalic  Eyes: PERRLA, EOMI, no pallor  Neck: supple, no Lymph nodes, no Thyromegaly, no JVD  CVS: RRR, No M/G, no JVD  Pulm: CTA B/l, no wheezing or rhonchi, no rales  Abd: Soft, NT, ND, normal bowel sounds  Extremities:no edema, no cyanosis   MSK: no DJD, no joint swellings, no joint tenderness   Neuro: Awake     LINES, CATHETERS, DRAINS, AIRWAYS, AND WOUNDS   Lines, Drains, Airways, Wounds:  Peripheral IV (Adult) 02/29/24 Right Antecubital (Active)   Number of days: 1       Peripheral IV (Adult) 03/01/24 Anterior;Left;Proximal Forearm (Active)   Number of days: 0       Wound Laceration Anterior;Upper Head (Active)   Number of days: 0       Comments:      LABS / IMAGING / TELE      Labs  Results from last 7 days   Lab Units 03/03/24  0349   WBC K/uL 8.72   HEMOGLOBIN g/dL 11.9   HEMATOCRIT % 36.3   PLATELETS K/uL 254     Results from last 7 days   Lab Units 03/01/24  0632   SODIUM mEQ/L 138   POTASSIUM mEQ/L 3.8   CHLORIDE mEQ/L 103   CO2 mEQ/L 27   BUN mg/dL 21   CREATININE mg/dL 0.7   GLUCOSE mg/dL 126*   CALCIUM mg/dL 9.2         Micro  Microbiology Results       ** No results found for the last 720 hours. **            Imaging  Transthoracic echo (TTE) complete    Result Date: 3/1/2024  Narrative: 1.  Normal LV size and function with ejection fraction of 65 to 70%. 2.  No regional wall motion abnormalities identified  . 3.  Great I diastolic dysfunction . 4.  No significant valvular heart disease seen.     CT ANGIOGRAPHY CHEST PULMONARY EMBOLISM WITH IV CONTRAST    Result Date: 2/29/2024  Narrative: CLINICAL HISTORY: Dizziness and left hip pain. COMMENT: CT angiogram examination of the chest was performed using contiguous 3 mm transaxial sections. Reformats were made in the coronal and sagittal planes by the technologist. Images were acquired after the uneventful administration of 100 cc of Isovue-370 intravenously. 3D MIP and/or volume rendered image reconstruction performed and reviewed. CT DOSE:  One or more dose reduction techniques (e.g. automated exposure control, adjustment of the mA and/or kV according to patient size, use of iterative reconstruction technique) utilized for this examination. Comparison Studies: None. Lower Neck: Thyroid nodules, largest in the right lobe measuring 1.6 cm Axilla: Negative. Lung parenchyma: Partial atelectasis of the right upper lobe and both lower lobes. Pleura: Small right pleural effusion.  No pneumothorax. Mediastinum and mariel: No evidence of pulmonary emboli.  Atherosclerotic calcifications of the thoracic aorta. Heart and pericardium: Cardiomegaly.  No pericardial thickening or significant effusion. Chest wall: Large hiatal hernia. Bones: Multilevel thoracic spine degenerative changes.  No suspicious osteolytic or osteoblastic lesions.  Glenohumeral joint degenerative changes. Osteochondral loose bodies in the subcoracoid region on the right.  S-shaped thoracolumbar scoliosis. Visualized portions of Upper Abdomen: Negative.     Impression: IMPRESSION: 1.  No evidence of pulmonary emboli. 2.  Small right pleural effusion.     CT PELVIS WITHOUT IV CONTRAST    Result Date: 2/29/2024  Narrative: CLINICAL HISTORY:    Rule out occult hip fracture COMPARISON:    None COMMENT: Technique: CT of the pelvis without contrast: CT DOSE:  One or more dose reduction techniques (e.g. automated  exposure control, adjustment of the mA and/or kV according to patient size, use of iterative reconstruction technique) utilized for this examination. COMMENT: Bones: Osteopenia. Acute nondisplaced right superior pubic ramus fracture. Joints: No osseous malalignment. Severe osteoarthritis of the right hip. Mild left hip osteoarthritis Soft Tissues: There is right extra peritoneal hematoma noted in the space of Retzius/right pelvic sidewall. A right adnexal cyst measuring 3.8 cm     Impression: IMPRESSION: Acute nondisplaced right superior pubic ramus fracture with associated right pelvic sidewall/extraperitoneal hemorrhage.    X-RAY HIP WITH OR WITHOUT PELVIS 2-3 VW RIGHT    Result Date: 2/29/2024  Narrative: CLINICAL HISTORY: Traumatic hip pain post fall TECHNIQUE: AP pelvis. AP and 2 frog leg lateral radiographs right hip. COMPARISON: None available FINDINGS: Diffuse osteopenia. Question fracture of the right superior pubic ramus. Proximal right femur intact. Alignment maintained. Severe degenerative changes right hip. Sacrum obscured by bowel gas.     Impression: IMPRESSION: Possible nondisplaced fracture right superior pubic ramus. This could be better evaluated with CT.     X-RAY CHEST 1 VIEW    Result Date: 2/29/2024  Narrative: CLINICAL HISTORY: Syncope. AP portable view of the chest. COMPARISON: None available. COMMENT: The heart is enlarged. The cardiomediastinal silhouette is unremarkable. The lungs are hyperinflated suggesting COPD. There is a subtle 9 mm nodular density right midlung. Patchy opacities in the right upper lung may be due to infection. There is a hiatal hernia. There are degenerative changes of the spine and shoulders.     Impression: IMPRESSION: 1. 9 mm nodular density right midlung. 2. Patchy opacities in the right upper lung which may be due to infection. 2. Further evaluation with CT chest is recommended.    CT HEAD WITHOUT IV CONTRAST, CT CERVICAL SPINE WITHOUT IV CONTRAST    Result  Date: 2/29/2024  Narrative: CLINICAL HISTORY: Status post fall. COMMENT: Contiguous axial CT images of the brain and cervical spine are performed with sagittal and coronal reformatted images. COMPARISON:  None CT DOSE: One or more dose reduction technique (e.g.automated exposure control, adjustment of the kV and/or mA according to the patient's size, use of iterative reconstruction technique) utilized for this examination. CT BRAIN: Generalized cerebral volume loss is noted.  Decrease in density is seen in the periventricular white matter tracts most likely representing chronic small vessel ischemic disease.  No intraparenchymal mass, acute infarcts or hemorrhages are seen.  The ventricles and the cisterns are normal. No extra-axial masses or focal fluid collections are seen. There is no midline shift. Visualized portions of the paranasal sinuses and the mastoid air cells are clear. No acute fractures are seen. CT CERVICAL SPINE: Diffuse osteopenia is noted.  No acute fractures or posttraumatic spondylolisthesis are identified. No soft tissue edema is seen.  Minimal degenerative spondylolisthesis is seen at C3-C4 and C4-C5 levels where the inferior vertebral bodies are anterior to superior vertebral bodies. Degenerative changes are identified most prominent at C4-C5 level where there is mild to moderate disc osteophyte bulge with bilateral uncovertebral and facet joint hypertrophy causing mild central canal stenosis and moderate bilateral neural foraminal stenosis. The sagittal and coronal reformatted images demonstrate normal facet joint alignment. Evaluation of lower cervical spine is limited due bilateral shoulders. Visualized lung apices demonstrate linear opacity in the posterior portion of the right upper lobe possibly attending atelectasis.  Of note, evaluation of the lung parenchyma is somewhat limited secondary to patient motion.     Impression: IMPRESSION: 1. No posttraumatic intracranial abnormality. 2.  No acute cervical spine fractures.  As clinically indicated, MRI of the cervical spine is recommended for further evaluation. 3. Other incidental findings noted under the comment.           ASSESSMENT AND PLAN      HTN (hypertension)  Assessment & Plan  Controlled    - Continue amlodipine    Leukocytosis  Assessment & Plan  -resolved       Hypothyroid  Assessment & Plan  Continue Synthroid    Syncope  Assessment & Plan  Patient presenting after fall at assisted living facility.  Patient states she had a syncopal episode, with LOC.  Caregivers had reported to EMS however it was a mechanical fall.   Patient denied chest pain.  Troponin 10.7--> 13.7     -Continuous telemetry monitoring  -Echo- normal EF, no regional wall motion abnormalities  -Cardiology on board     * Fracture of right superior pubic ramus, closed, initial encounter (CMS/[facility])  Assessment & Plan  Presenting after a fall.  CT pelvis shows acute nondisplaced right superior pubic ramus fracture with right sidewall/extraperitoneal hemorrhage.  Ortho was consulted and saw patient in ED.    -Per Ortho WBAT with walker  -Pain control: Tylenol scheduled every 6 hours, lidocaine patch, tramadol for severe pain   -PT/OT  -Case management  -Fall precautions  -SCDs for DVT prophylaxis only for now 2/2 extraperitoneal hemorrhage    Stable for dc- awaiting placement to [facility]              VTE Assessment: [place] VTE Score: 6  VTE Prophylaxis Plan: SCD  Code Status: DNR (A.N.D.)  Estimated Discharge Date: 3/4/2024  Disposition Planning: pending improvement      Erich Caballero MD  3/3/2024

## 2024-03-03 NOTE — PLAN OF CARE
Care Coordination Discharge Plan Note     Discharge Needs Assessment  Concerns to be Addressed: discharge planning  Current Discharge Risk: physical impairment    Anticipated Discharge Plan  Anticipated Discharge Disposition: skilled nursing facility  Type of Skilled Nursing Care Services: OT, PT, nursing      Patient Choice  Offered/Gave Vendor List: yes  Patient's Choice of Community Agency(s): [facility]    Patient and/or patient guardian/advocate was made aware of their right to choose a provider. A list of eligible providers was presented and reviewed with the patient and/or patient guardian/advocate in written and/or verbal form. The list delineates providers in the patient’s desired geographic area who are participating in the Medicare program and/or providers contracted with the patient’s primary insurance. The Medicare list and quality ratings were obtained from the Medicare.gov [medicare.gov] website.    ---------------------------------------------------------------------------------------------------------------------    Interdisciplinary Discharge Plan Review:  Participants:, nursing, physical therapy, physician, family/lay caregiver    Concerns Comments: RNCC Spoke to Yaa at [facility] at 0830.  Auth approved #[id number].  Called Ambuln at 9am and again at 1200 when there was no transport time assigned.  Given a 10pm  at that tme.  \[facility]\"" transport scheduled for 1100 3/4/24 trip #[id number].  [facility] requested updated PT notes at that time to update the patient's skilled rehabilitation authorization. OOH DNR placed on chart and IMM letter signed.  Provider team and bedside nurse updated on discharge plan.  RNCC spoke with shamika Andrade on the phone and provided an update.  RNCC will continue to follow and support discharge.    Discharge Plan:   Disposition/Destination: Skilled Nursing Facility - Other  / Skilled Nursing Facility  Discharge Facility:   Destination - Admitted Since  2/29/2024       Service Provider Selected Services Address Phone Fax Patient Preferred Last Updated    [facility] [address], [place] [id number] [phone] -- Gabriele Tejada MSW 3/2/2024 1111       Internal Comment last updated by Gabriele Tejada MSW 3/2/2024 1111    Will need insurance authorization                      Community Resources:      Discharge Transportation:  Is Out of Hospital DNR needed at Discharge: yes  Does patient need discharge transport? Yes  Type of Transportation: basic life support  What day is the transport expected?: 03/04/24  What time is the transport expected?: 1100

## 2024-03-03 NOTE — PLAN OF CARE
Plan of Care Review  Plan of Care Reviewed With: patient, child  Progress: no change  Outcome Evaluation: vss pain better control on tramadol, there was a bed available at [facility] but was unable to arrange transport until tomorrow am call bell within reach

## 2024-03-03 NOTE — PLAN OF CARE
Plan of Care Review  Plan of Care Reviewed With: patient  Progress: no change  Outcome Evaluation: VsS. Fall precautions in place. Call bell within reach.

## 2024-03-04 VITALS
RESPIRATION RATE: 18 BRPM | OXYGEN SATURATION: 93 % | SYSTOLIC BLOOD PRESSURE: 146 MMHG | TEMPERATURE: 97.3 F | DIASTOLIC BLOOD PRESSURE: 71 MMHG | HEART RATE: 77 BPM | WEIGHT: 168 LBS | BODY MASS INDEX: 31.72 KG/M2 | HEIGHT: 61 IN

## 2024-03-04 PROCEDURE — 63700000 HC SELF-ADMINISTRABLE DRUG: Performed by: STUDENT IN AN ORGANIZED HEALTH CARE EDUCATION/TRAINING PROGRAM

## 2024-03-04 PROCEDURE — 97530 THERAPEUTIC ACTIVITIES: CPT | Mod: GO

## 2024-03-04 PROCEDURE — 63700000 HC SELF-ADMINISTRABLE DRUG: Performed by: SPEECH-LANGUAGE PATHOLOGIST

## 2024-03-04 PROCEDURE — 97530 THERAPEUTIC ACTIVITIES: CPT | Mod: GP,CQ

## 2024-03-04 PROCEDURE — 63700000 HC SELF-ADMINISTRABLE DRUG: Performed by: HOSPITALIST

## 2024-03-04 PROCEDURE — 97535 SELF CARE MNGMENT TRAINING: CPT | Mod: GO

## 2024-03-04 PROCEDURE — 99238 HOSP IP/OBS DSCHRG MGMT 30/<: CPT | Performed by: STUDENT IN AN ORGANIZED HEALTH CARE EDUCATION/TRAINING PROGRAM

## 2024-03-04 RX ORDER — TRAMADOL HYDROCHLORIDE 50 MG/1
25 TABLET ORAL EVERY 6 HOURS PRN
Qty: 8 TABLET | Refills: 0 | Status: SHIPPED | OUTPATIENT
Start: 2024-03-04 | End: 2024-03-09

## 2024-03-04 RX ORDER — LIDOCAINE 560 MG/1
1 PATCH PERCUTANEOUS; TOPICAL; TRANSDERMAL DAILY
Start: 2024-03-04 | End: 2024-10-09 | Stop reason: ENTERED-IN-ERROR

## 2024-03-04 RX ORDER — ACETAMINOPHEN 325 MG/1
650 TABLET ORAL EVERY 6 HOURS PRN
Start: 2024-03-04 | End: 2024-04-03

## 2024-03-04 RX ORDER — BISACODYL 10 MG/1
10 SUPPOSITORY RECTAL DAILY PRN
Start: 2024-03-04 | End: 2024-10-09 | Stop reason: ENTERED-IN-ERROR

## 2024-03-04 RX ADMIN — AMLODIPINE BESYLATE 2.5 MG: 2.5 TABLET ORAL at 09:16

## 2024-03-04 RX ADMIN — ACETAMINOPHEN 650 MG: 325 TABLET, FILM COATED ORAL at 13:11

## 2024-03-04 RX ADMIN — ACETAMINOPHEN 650 MG: 325 TABLET, FILM COATED ORAL at 05:34

## 2024-03-04 RX ADMIN — LEVOTHYROXINE SODIUM 88 MCG: 0.09 TABLET ORAL at 05:34

## 2024-03-04 RX ADMIN — LIDOCAINE 1 PATCH: 4 PATCH TOPICAL at 09:17

## 2024-03-04 RX ADMIN — TRAMADOL HYDROCHLORIDE 25 MG: 50 TABLET, COATED ORAL at 09:16

## 2024-03-04 ASSESSMENT — COGNITIVE AND FUNCTIONAL STATUS - GENERAL
STANDING UP FROM CHAIR USING ARMS: 2 - A LOT
EATING MEALS: 4 - NONE
MOVING TO AND FROM BED TO CHAIR: 2 - A LOT
MOVING TO AND FROM BED TO CHAIR: 2 - A LOT
HELP NEEDED FOR PERSONAL GROOMING: 3 - A LITTLE
AFFECT: ANXIOUS
DRESSING REGULAR LOWER BODY CLOTHING: 2 - A LOT
HELP NEEDED FOR BATHING: 2 - A LOT
WALKING IN HOSPITAL ROOM: 1 - TOTAL
DRESSING REGULAR UPPER BODY CLOTHING: 3 - A LITTLE
CLIMB 3 TO 5 STEPS WITH RAILING: 1 - TOTAL
TOILETING: 2 - A LOT
WALKING IN HOSPITAL ROOM: 1 - TOTAL
STANDING UP FROM CHAIR USING ARMS: 2 - A LOT
CLIMB 3 TO 5 STEPS WITH RAILING: 1 - TOTAL
AFFECT: ANXIOUS

## 2024-03-04 NOTE — NURSING NOTE
Report called to Becky Maajno. All questions answered. Patient's son   Raymond notified of discharge time.

## 2024-03-04 NOTE — PROGRESS NOTES
Occupational Therapy -  Daily Treatment/Progress Note     Patient: Sofie Stewart  Location: [place] [address] [address]  MRN: [id number]  Today's date: 3/4/2024    HISTORY OF PRESENT ILLNESS     Sofie is a 89 y.o. female admitted on 2/29/2024 with Syncope and collapse [R55]  Fall, initial encounter [W19.XXXA]  Fracture of right superior pubic ramus, closed, initial encounter (CMS/[facility]) [S32.511A]  Closed fracture of ramus of right pubis, initial encounter (CMS/[facility]) [S32.591A]. Principal problem is Fracture of right superior pubic ramus, closed, initial encounter (CMS/[facility]).    Past Medical History  Sofie has no past medical history on file.    History of Present Illness   Sofie Stewart is a 89 y.o. female with a past medical history of HTN, hyponatremia, hypothyroid, HLD who presents following a fall earlier today. CT head negative, CT C-spine negative, chest x-ray shows 9 mm nodular density right midlung, patchy opacities in the right upper lung.   CT pelvis shows acute nondisplaced right superior pubic ramus fracture with right sidewall/extraperitoneal hemorrhage. Per Ortho WBAT with walker  PRIOR LEVEL OF FUNCTION AND LIVING ENVIRONMENT     Prior Level of Function      Flowsheet Row Most Recent Value   Dominant Hand right   Ambulation assistive equipment   Transferring assistive equipment   Toileting assistive equipment   Bathing assistive equipment   Dressing independent   Eating independent   IADLs assistive person   Driving/Transportation friends/family   Communication understands/communicates without difficulty   Prior Level of Function Comment RW use   Assistive Device Currently Used at Home walker, front-wheeled          Prior Living Environment      Flowsheet Row Most Recent Value   People in Home alone   Current Living Arrangements assisted living facility   Living Environment Comment shannondell, elevator, shower stall          Occupational Profile      Flowsheet Row Most Recent Value   Successful  Occupations [employer] Retired   Occupational History/Life Experiences Resident of [employer]   Environmental Supports and Barriers Supportive Community          VITALS AND PAIN     OT Vitals      Date/Time Pulse SpO2 Pt Activity O2 Therapy BP BP Location BP Method Pt Position [facility]   03/04/24 0944 77 93 % At rest None (Room air) 146/71 Right upper arm Automatic Lying KK          OT Pain      Date/Time Pain Type Side/Orientation Location Rating: Rest Rating: Activity Interventions [facility]   03/04/24 0944 Pain Assessment right pelvic 0 6 breathing exercises utilized KK             Objective   OBJECTIVE     Start time:  0941  End time:  1005  Session Length: 24 min  Mode of Treatment: co-treatment, occupational therapy (priority request for DC planning     ADL focus)    General Observations  Patient received supine, in bed. She was agreeable to therapy, no issues or concerns identified by nurse prior to session.      Precautions: fall, cardiac, weight bearing (WBAT B/L LE per ortho note)  Extremity Weight-Bearing Status: left lower extremity, right lower extremity  Left LE Weight-Bearing Status: weight-bearing as tolerated (WBAT)  Right LE Weight-Bearing Status: weight-bearing as tolerated (WBAT)           OT Eval and Treat - 03/04/24 0941          Cognition    Orientation Status verbal cues/prompts needed for orientation;oriented to;person;time   place= [facility]    Affect/Mental Status anxious     Follows Commands follows one-step commands;50-74% accuracy;repetition of directions required   hard of hearing requiring loud voice/repeating commands    Cognitive Function executive function deficit;safety deficit;attention deficit     Attention Deficit minimal deficit;concentration     Executive Function Deficit moderate deficit;insight/awareness of deficits;information processing     Safety Deficit moderate deficit;awareness of need for assistance;insight into deficits/self-awareness     Comment, Cognition appears  fearful of falling with activity        Bed Mobility    Bed Mobility Activities left;supine to sit     [place] close supervision     Safety/Cues increased time to complete     Assistive Device bed rails        Mobility Belt    Mobility Belt Used for All Out of Bed Activity yes        Sit/Stand Transfer    Surface chair with arm rests;edge of bed     [place] maximum assist (25-49% patient effort);2 person assist;minimum assist (75% or more patient effort)     Assistive Device walker, front-wheeled;none     Transfer Comments MAX A of 2 to stand, MIN A of 2 to sit        Surface-to-Surface Transfers    Transfer Location bed to chair     Transfer Technique stand pivot     [place] maximum assist (25-49% patient effort);2 person assist     Safety/Cues increased time to complete;technique     Assistive Device none        Lower Body Dressing    Tasks don;doff;socks     [place] moderate assist (50-74% patient effort)     Safety/Cues increased time to complete     Position supported sitting     Comment increased difficulty with R sock        Grooming    Tasks brushes/mclean hair     [place] close supervision     Safety/Cues increased time to complete     Position unsupported sitting;edge of bed sitting        Toileting    Comment no needs time of treatment        Balance    Static Sitting Balance WFL;supported;sitting in chair     Dynamic Sitting Balance mild impairment;unsupported;sitting, edge of bed     Sit to Stand Dynamic Balance severe impairment;supported     Static Standing Balance severe impairment;supported     Dynamic Standing Balance severe impairment;supported        Impairments/Safety Issues    Impairments Affecting Function balance;cognition;endurance/activity tolerance;pain     Cognitive Impairments, Safety/Performance attention;insight into deficits/self-awareness     Safety Issues Affecting Function ability to follow commands;insight into deficits/self-awareness;awareness of need for  assistance        Upper Extremity (Therapeutic Exercise)    Range of Motion Exercises bilateral;shoulder flexion/extension;wrist flexion/extension;elbow flexion/extension     Reps and Sets 10 reps 1 set                                    Education Documentation  Self-Care, taught by Gretel Portillo OT at 3/4/2024 10:28 AM.  Learner: Patient  Readiness: Acceptance  Method: Explanation, Demonstration  Response: Needs Reinforcement  Comment: UE Lizet, safe functional transfers, LB Dressing, call bell use        Session Outcome  Patient reclined, in chair at end of session, chair alarm on, call light in reach, all needs met, personal items in reach. Nursing notified about patient's performance, patient's response to therapy/activity, and patient's position.    AM-PAC™ - ADL (Current Function)     Putting on/taking off regular lower body clothing 2 - A Lot   Bathing 2 - A Lot   Toileting 2 - A Lot   Putting on/taking off regular upper body clothing 3 - A Little   Help for taking care of personal grooming 3 - A Little   Eating meals 4 - None   AM-PAC™ ADL Score 16      ASSESSMENT AND PLAN     Progress Summary  [facility] 16, close SUP supine to sit, MAX A of 2 sit/stand with no AD and RW, MAX A of 2 OOB to chair with no AD, MOD A LB Dressing, close SUP grooming, difficulty with commands due to hard of hearing, ADLs not at baseline, Rec continued OT and SNF    Patient/Family Therapy Goal Statement: Return home following rehab    OT Plan      Flowsheet Row Most Recent Value   Rehab Potential good, to achieve stated therapy goals at 03/04/2024 0941   Therapy Frequency 4 times/wk at 03/02/2024 1342   Planned Therapy Interventions activity tolerance training, BADL retraining, occupation/activity based interventions, patient/caregiver education/training, functional balance retraining, strengthening exercise, transfer/mobility retraining, adaptive equipment training at 03/04/2024 0941            OT Discharge Recommendations       Flowsheet Row Most Recent Value   OT Recommended Discharge Disposition skilled nursing facility at 03/02/2024 1342   Anticipated Equipment Needs if Discharged Home (OT) commode, 3-in-1, dressing equipment at 03/04/2024 0941                 OT Goals      Flowsheet Row Most Recent Value   Bed Mobility Goal 1    Activity/Assistive Device bed mobility activities, all at 03/01/2024 1046   [place] modified independence at 03/04/2024 0941   Time Frame by discharge at 03/01/2024 1046   Progress/Outcome goal ongoing at 03/01/2024 1046   Transfer Goal 1    Activity/Assistive Device toilet at 03/04/2024 0941   [place] supervision required at 03/01/2024 1046   Time Frame by discharge at 03/01/2024 1046   Progress/Outcome goal ongoing at 03/01/2024 1046   Dressing Goal 1    Activity/Adaptive Equipment dressing skills, all at 03/01/2024 1046   [place] supervision required at 03/01/2024 1046   Time Frame by discharge at 03/01/2024 1046   Progress/Outcome goal ongoing at 03/01/2024 1046   Toileting Goal 1    Activity/Assistive Device toileting skills, all at 03/01/2024 1046   [place] supervision required at 03/04/2024 0941   Time Frame by discharge at 03/01/2024 1046   Progress/Outcome goal ongoing at 03/01/2024 1046

## 2024-03-04 NOTE — DISCHARGE INSTRUCTIONS
Repeat complete blood count to monitor hemoglobin in 5-7  days. Follow up with your primary care physician.     Continue bowel regimen at [place].      Orthopedic Discharge Instructions             Surgeon:  Dr. Shawn Bryan         You were seen in the Emergency department on 2/29 for fractures of your pelvis. These were treated nonoperativaley. You may bear weight on your bilateral lower extremities to tolerance with assistance and a walking aid. Please follow up in office with Dr. Bryan or a nonoperative orthopedic surgeon within the [facility] group for repeat x-rays of your pelvis.     Orthopedic Follow up APPOINTMENT:  please call [phone] if you do not have one scheduled to see us in office in about 2 weeks after your discharge from the hospital.

## 2024-03-04 NOTE — PLAN OF CARE
Plan of Care Review  Plan of Care Reviewed With: patient  Progress: no change  Outcome Evaluation: VsS. Fall precautions in place. Tramadol given with good results. Pt turned and repositioned. Call bell within reach.

## 2024-03-04 NOTE — DISCHARGE SUMMARY
Hospital Medicine Service -  Inpatient Discharge Summary        BRIEF OVERVIEW   Admitting Provider: Erich Caballero MD  Attending Provider: Erich Caballero MD Attending phys phone: [phone]    PCP: Elba Patiño [phone]    Admission Date: 2/29/2024  Discharge Date: 3/4/2024     DISCHARGE DIAGNOSES      Primary Discharge Diagnosis  Fracture of right superior pubic ramus, closed, initial encounter (CMS/[facility])    Secondary Discharge Diagnoses  Active Hospital Problems    Diagnosis Date Noted   • Syncope and collapse 03/01/2024   • Fracture of right superior pubic ramus, closed, initial encounter (CMS/[facility]) 02/29/2024   • Syncope 02/29/2024   • Hypothyroid 02/29/2024   • Leukocytosis 02/29/2024   • HTN (hypertension) 02/29/2024      Resolved Hospital Problems   No resolved problems to display.       Problem List on Day of Discharge  HTN (hypertension)  Assessment & Plan  Controlled    - Continue amlodipine    Leukocytosis  Assessment & Plan  -resolved       Hypothyroid  Assessment & Plan  Continue Synthroid    Syncope  Assessment & Plan  Patient presenting after fall at assisted living facility.  Patient states she had a syncopal episode, with LOC.  Caregivers had reported to EMS however it was a mechanical fall.   Patient denied chest pain.  Troponin 10.7--> 13.7     -Continuous telemetry monitoring  -Echo- normal EF, no regional wall motion abnormalities  -Cardiology on board     * Fracture of right superior pubic ramus, closed, initial encounter (CMS/[facility])  Assessment & Plan  Presenting after a fall.  CT pelvis shows acute nondisplaced right superior pubic ramus fracture with right sidewall/extraperitoneal hemorrhage.  Ortho was consulted and saw patient in ED.    -Per Ortho WBAT with walker  -Pain control: Tylenol scheduled every 6 hours, lidocaine patch, tramadol for severe pain   -PT/OT  -Case management  -Fall precautions  -SCDs for DVT prophylaxis only for now 2/2 extraperitoneal  hemorrhage    Stable for dc- awaiting placement to [facility]           SUMMARY OF HOSPITALIZATION      Presenting Problem/History of Present Illness  This is a 89 y.o. year-old female admitted on 2/29/2024 with Syncope and collapse [R55]  Fall, initial encounter [W19.XXXA]  Fracture of right superior pubic ramus, closed, initial encounter (CMS/[facility]) [S32.511A]  Closed fracture of ramus of right pubis, initial encounter (CMS/[facility]) [S32.591A].      Hospital Course  Sofie Stewart is a 89 y.o. female with a past medical history of HTN, hyponatremia, hypothyroid, HLD who presented to the ED following a fall. She lives at assisted living facility at [facility]; caregivers reported mechanical fall. CT pelvis shows acute nondisplaced right superior pubic ramus fracture with right sidewall/extraperitoneal hemorrhage. Her hemoglobin was stable. Orthopedics evaluated the patient and recommended conservative care, WBAT. She was seen by PT/OT and recommended SNF placement. She had echocardiogram done- which revealed normal EF, and no regional wall motion abnormalities. She did not have any arrhythmias during her stay. She is medically stable to be discharged.     Exam on Day of Discharge  PE:   Gen- in no acute distress,  CV: RRR, No M/G, no JVD  Pulm: CTA B/l, no wheezes or rhonchi, no rales  Abd: Soft, NT, ND, normal bowel sounds  Extremities: normal range of motion      Consults During Admission  IP CONSULT TO ORTHOPEDIC SURGERY  IP CONSULT TO CARDIOLOGY  IP CONSULT TO CASE MANAGEMENT    DISCHARGE MEDICATIONS               Medication List      START taking these medications    acetaminophen 325 mg tablet  Commonly known as: TYLENOL  Take 2 tablets (650 mg total) by mouth every 6 (six) hours as needed for moderate pain or mild pain.  Dose: 650 mg     lidocaine 4 % adhesive patch,medicated topical patch  Commonly known as: ASPERCREME  Apply 1 patch topically daily for 7 days. Remove & discard patch within 12 hours or as  directed by prescriber.  Dose: 1 patch     traMADoL 50 mg tablet  Commonly known as: ULTRAM  Take 0.5 tablets (25 mg total) by mouth every 6 (six) hours as needed for severe pain (for severe pain) for up to 5 days.  Dose: 25 mg        CONTINUE taking these medications    amLODIPine 2.5 mg tablet  Commonly known as: NORVASC  Take 2.5 mg by mouth daily.  Dose: 2.5 mg     bethanechol 10 mg tablet  Commonly known as: URECHOLINE  Take 10 mg by mouth 3 (three) times a day.  Dose: 10 mg     fluticasone propionate 50 mcg/actuation nasal spray  Commonly known as: FLONASE  Administer 1 spray into each nostril daily.  Dose: 1 spray     levothyroxine 88 mcg tablet  Commonly known as: SYNTHROID  Take 88 mcg by mouth daily.  Dose: 88 mcg     pravastatin 20 mg tablet  Commonly known as: PRAVACHOL  Take 20 mg by mouth nightly.  Dose: 20 mg     sennosides-docusate sodium 8.6-50 mg  Commonly known as: SENOKOT-S  Take 2 tablets by mouth daily as needed for constipation.  Dose: 2 tablet                PROCEDURES / LABS / IMAGING          Pertinent Labs,   Pertinent Imaging  Results from last 7 days   Lab Units 03/03/24  0349   WBC K/uL 8.72   HEMOGLOBIN g/dL 11.9   HEMATOCRIT % 36.3   PLATELETS K/uL 254     Results from last 7 days   Lab Units 03/01/24  0632   SODIUM mEQ/L 138   POTASSIUM mEQ/L 3.8   CHLORIDE mEQ/L 103   CO2 mEQ/L 27   BUN mg/dL 21   CREATININE mg/dL 0.7   GLUCOSE mg/dL 126*   CALCIUM mg/dL 9.2     Microbiology Results     ** No results found for the last 720 hours. **            CT ANGIOGRAPHY CHEST PULMONARY EMBOLISM WITH IV CONTRAST    Result Date: 2/29/2024  IMPRESSION: 1.  No evidence of pulmonary emboli. 2.  Small right pleural effusion.     CT PELVIS WITHOUT IV CONTRAST    Result Date: 2/29/2024  IMPRESSION: Acute nondisplaced right superior pubic ramus fracture with associated right pelvic sidewall/extraperitoneal hemorrhage.    X-RAY HIP WITH OR WITHOUT PELVIS 2-3 VW RIGHT    Result Date:  2/29/2024  IMPRESSION: Possible nondisplaced fracture right superior pubic ramus. This could be better evaluated with CT.     X-RAY CHEST 1 VIEW    Result Date: 2/29/2024  IMPRESSION: 1. 9 mm nodular density right midlung. 2. Patchy opacities in the right upper lung which may be due to infection. 2. Further evaluation with CT chest is recommended.    CT HEAD WITHOUT IV CONTRAST    Result Date: 2/29/2024  IMPRESSION: 1. No posttraumatic intracranial abnormality. 2. No acute cervical spine fractures.  As clinically indicated, MRI of the cervical spine is recommended for further evaluation. 3. Other incidental findings noted under the comment.     CT CERVICAL SPINE WITHOUT IV CONTRAST    Result Date: 2/29/2024  IMPRESSION: 1. No posttraumatic intracranial abnormality. 2. No acute cervical spine fractures.  As clinically indicated, MRI of the cervical spine is recommended for further evaluation. 3. Other incidental findings noted under the comment.       OUTPATIENT  FOLLOW-UP / REFERRALS / PENDING TESTS        Outpatient Follow-Up Appointments  Encounter Information    This patient does not currently have any appointments scheduled.         Referrals  No orders of the defined types were placed in this encounter.      Test Results Pending at Discharge  Unresulted Labs (From admission, onward)     Start     Ordered    02/29/24 1602  [facility] Draw Panel  STAT        Question Answer Comment   Red Top 1 Label    Gold Top 1 Label    Light Blue No Labels    Lavender Top No Labels    Pink Top 1 Label    Yellow - Urine Tall No Labels    Urine Culture Tube No Labels    Blood Culture No Labels        02/29/24 1601                Important Issues to Address in Follow-Up  F/u with PCP, Orthopedics    DISCHARGE DISPOSITION AND DESTINATION      Disposition: Skilled Nursing Facility - Other   Destination: Skilled Nursing Facility                            Code Status At Discharge: DNR (A.N.D.)    Physician Order for Life-Sustaining  Treatment Document Status      No documents found

## 2024-03-04 NOTE — PROGRESS NOTES
Physical Therapy -  Daily Treatment/Progress Note     Patient: Sofie Stewart  Location: [place] [address] [address]  MRN: [id number]  Today's date: 3/4/2024    HISTORY OF PRESENT ILLNESS     Sofie is a 89 y.o. female admitted on 2/29/2024 with Syncope and collapse [R55]  Fall, initial encounter [W19.XXXA]  Fracture of right superior pubic ramus, closed, initial encounter (CMS/[facility]) [S32.511A]  Closed fracture of ramus of right pubis, initial encounter (CMS/[facility]) [S32.591A]. Principal problem is Fracture of right superior pubic ramus, closed, initial encounter (CMS/[facility]).    Past Medical History  Sofie has no past medical history on file.    History of Present Illness   Sofie Stewart is a 89 y.o. female with a past medical history of HTN, hyponatremia, hypothyroid, HLD who presents following a fall earlier today. CT head negative, CT C-spine negative, chest x-ray shows 9 mm nodular density right midlung, patchy opacities in the right upper lung.   CT pelvis shows acute nondisplaced right superior pubic ramus fracture with right sidewall/extraperitoneal hemorrhage. Per Ortho WBAT with walker  PRIOR LEVEL OF FUNCTION AND LIVING ENVIRONMENT     Prior Level of Function      Flowsheet Row Most Recent Value   Dominant Hand right   Ambulation assistive equipment   Transferring assistive equipment   Toileting assistive equipment   Bathing assistive equipment   Dressing independent   Eating independent   IADLs assistive person   Driving/Transportation friends/family   Communication understands/communicates without difficulty   Prior Level of Function Comment RW use   Assistive Device Currently Used at Home walker, front-wheeled          Prior Living Environment      Flowsheet Row Most Recent Value   People in Home alone   Current Living Arrangements assisted living facility   Living Environment Comment [place], elevator, shower stall          VITALS AND PAIN     PT Vitals      Date/Time Pulse SpO2 Pt Activity O2 Therapy  BP BP Location BP Method Pt Position [facility]   03/04/24 0944 77 93 % At rest None (Room air) 146/71 Right upper arm Automatic Lying KK          PT Pain      Date/Time Pain Type Side/Orientation Location Rating: Rest Rating: Activity Interventions [facility]   03/04/24 0944 Pain Assessment right pelvic 0 6 breathing exercises utilized KK             Objective   OBJECTIVE     Start time:  0942  End time:  1003  Session Length: 21 min  Mode of Treatment: individual therapy, co-treatment, physical therapy (co-tx in part 2/2 priority request for d/c and complexity/safety)    General Observations  Patient received supine, in bed. She was agreeable to therapy, no issues or concerns identified by nurse prior to session. OT present; pt very [place]    Precautions: fall, cardiac, weight bearing  Extremity Weight-Bearing Status: left lower extremity, right lower extremity  Left LE Weight-Bearing Status: weight-bearing as tolerated (WBAT)  Right LE Weight-Bearing Status: weight-bearing as tolerated (WBAT)    Limitations/Impairments: hearing, safety/cognitive   Services  Do You Speak a Language Other Than English at Home?: no      PT Eval and Treat - 03/04/24 0942          Cognition    Orientation Status oriented to;person;time;verbal cues/prompts needed for orientation;place     Affect/Mental Status anxious     Follows Commands follows one-step commands;repetition of directions required;verbal cues/prompting required     Cognitive Function executive function deficit;attention deficit;safety deficit     Attention Deficit minimal deficit;concentration     Executive Function Deficit moderate deficit;insight/awareness of deficits;self-monitoring/self-correction     Safety Deficit moderate deficit;insight into deficits/self-awareness;awareness of need for assistance;problem-solving     Comment, Cognition cdeficits compounded by hearing deficit        Hearing Assessment    Hearing Status hearing impairment, bilaterally     Impact of  Hearing Impairment on Functional Status moderate deficit; speak slowly, clearly, loudly; see/hear  may be benficial        Bed Mobility    Bed Mobility Activities left;supine to sit     [place] close supervision     Safety/Cues increased time to complete     Assistive Device bed rails;head of bed elevated;mattress firmed     Comment HOB 24deg; OOB (L); pt insistant on performing unasst'd        Mobility Belt    Mobility Belt Used for All Out of Bed Activity yes        Sit/Stand Transfer    Surface edge of bed;chair with arm rests     [place] 2 person assist;other (see comments)     Safety/Cues increased time to complete;moderate;verbal cues;hand placement;maintaining center of gravity over base of support;preparatory posture     Assistive Device walker, front-wheeled     Transfer Comments Max 2 to stand from bed and chair; Min A x 2 to sit and bed and chair; fair eccentric control; cue mechs and maintaining wt over VANESSA; additionally, hand plcmt all trxfs        Surface-to-Surface Transfers    Transfer Location bed to chair   R to L    Transfer Technique stand pivot     [place] maximum assist (25-49% patient effort);2 person assist     Safety/Cues increased time to complete;moderate;verbal cues;technique;preparatory posture;maintaining center of gravity over base of support     Assistive Device none     Transfer Comments requires A/P sppt; unable to tolearate adequate WB RLE to safely transition bed to chair w/ RW        Gait Training    [place], Gait other (see comments);unable to assess     Comment (Gait/Stairs) attempted; requires A/P sppt; unable to tolearate adequate WB RLE to safely transition w/ RW        Balance    Static Sitting Balance WFL;supported;sitting, edge of bed   self    Dynamic Sitting Balance other (see comments)   assessed by OT    Sit to Stand Dynamic Balance severe impairment;supported     Static Standing Balance severe impairment;supported     Dynamic Standing  Balance severe impairment;supported     Comment, Balance requires A/P sppt; unable to tolearate adequate WB RLE to safely transition bed to chair or progress gait w/ RW this session; Ax2        Lower Extremity (Therapeutic Exercise)    Exercise Position/Type supine;seated;AROM (active range of motion)     General Exercise left;right;ankle pumps;heel slides;LAQ (long arc quad);hip aBduction;hip aDduction;marching while seated;other (see comments)     Reps and Sets 10 APs x 2; 5 reps all other therex     Comment other: flutter kicks in chair, abd/add in chair; decr'd ROM/strength, and incr'd pain RLE, renan AG        Impairments/Safety Issues    Impairments Affecting Function pain;strength;endurance/activity tolerance;postural/trunk control;balance;cognition;other (see comments)   hearing    Cognitive Impairments, Safety/Performance attention;insight into deficits/self-awareness;problem-solving/reasoning     Functional Endurance fair (-); pain     Safety Issues Affecting Function insight into deficits/self-awareness;awareness of need for assistance;problem-solving;sequencing abilities     Comment, Safety Issues/Impairments pleasant, cooperative                                    Education Documentation  Joint Mobility/Strength, taught by Herminio Perez PTA at 3/4/2024 11:04 AM.  Learner: Patient  Readiness: Acceptance  Method: Explanation, Demonstration  Response: Verbalizes Understanding, Demonstrated Understanding, Needs Reinforcement  Comment: PT role and POC; posture, balance, body mechs, trxf tech, appropriate use of asst dev, fall safety; therex    Assistive/Adaptive Devices, taught by Herminio Perez PTA at 3/4/2024 11:04 AM.  Learner: Patient  Readiness: Acceptance  Method: Explanation, Demonstration  Response: Verbalizes Understanding, Demonstrated Understanding, Needs Reinforcement  Comment: PT role and POC; posture, balance, body mechs, trxf tech, appropriate use of asst dev, fall safety;  therex    Signs/Symptoms, taught by Herminio Perez PTA at 3/4/2024 11:04 AM.  Learner: Patient  Readiness: Acceptance  Method: Explanation, Demonstration  Response: Verbalizes Understanding, Demonstrated Understanding, Needs Reinforcement  Comment: PT role and POC; posture, balance, body mechs, trxf tech, appropriate use of asst dev, fall safety; therex    Risk Factors, taught by Herminio Perez PTA at 3/4/2024 11:04 AM.  Learner: Patient  Readiness: Acceptance  Method: Explanation, Demonstration  Response: Verbalizes Understanding, Demonstrated Understanding, Needs Reinforcement  Comment: PT role and POC; posture, balance, body mechs, trxf tech, appropriate use of asst dev, fall safety; therex        Session Outcome  Patient in chair, reclined at end of session, chair alarm on, all needs met, call light in reach, personal items in reach. Nursing notified about patient's performance, patient's position, and patient's response to therapy/activity.    AM-PAC™ - Mobility (Current Function)     Turning form your back to your side while in flat bed without using bedrails 3 - A Little   Moving from lying on your back to sitting on the side of a flat bed without using bedrails 3 - A Little   Moving to and from a bed to a chair 2 - A Lot   Standing up from a chair using your arms 2 - A Lot   To walk in a hospital room 1 - Total   Climbing 3-5 steps with a railing 1 - Total   AM-PAC™ Mobility Score 12      ASSESSMENT AND PLAN     Progress Summary  [facility] mob 12/24; Max A x 2 OOB to chair, mob; Min A x 2 to sit, chair and bed; CS to EOB, incr'd time to complete; gait attempted; Ax2, [place]; pleasant and cooperative, works well w/ PT to progress.  Cont PT per POC.    Patient/Family Therapy Goals Statement: less pain    PT Plan      Flowsheet Row Most Recent Value   Rehab Potential good, to achieve stated therapy goals at 03/04/2024 0942   Therapy Frequency 5 times/wk at 03/04/2024 0942   Planned Therapy Interventions postural  re-education, balance training, strengthening, ROM (range of motion), transfer training, gait training, patient/family education at 03/04/2024 0942            PT Discharge Recommendations      Flowsheet Row Most Recent Value   PT Recommended Discharge Disposition skilled nursing facility at 03/04/2024 0942   Anticipated Equipment Needs if Discharged Home (PT) wheelchair, wheelchair cushion, commode, 3-in-1 at 03/04/2024 0942                 PT Goals      Flowsheet Row Most Recent Value   Bed Mobility Goal 1    Activity/Assistive Device sit to supine/supine to sit at 03/01/2024 1047   [place] supervision required at 03/01/2024 1047   Time Frame by discharge at 03/01/2024 1047   Progress/Outcome goal ongoing at 03/01/2024 1047   Transfer Goal 1    Activity/Assistive Device sit-to-stand/stand-to-sit, walker, front-wheeled at 03/01/2024 1047   [place] supervision required at 03/01/2024 1047   Time Frame by discharge at 03/01/2024 1047   Progress/Outcome goal ongoing at 03/01/2024 1047   Gait Training Goal 1    Activity/Assistive Device gait (walking locomotion), walker, front-wheeled at 03/01/2024 1047   [place] minimum assist (75% or more patient effort) at 03/01/2024 1047   Distance 30 at 03/01/2024 1047   Time Frame by discharge at 03/01/2024 1047   Progress/Outcome goal ongoing at 03/01/2024 1047

## 2024-03-05 NOTE — UM PHYSICIAN REVIEW NOTE
Peer to peer      Will request a peer to peer for this 89 y.o. female with a past medical history of HTN, hyponatremia, hypothyroid, HLD who presented to the ED following a fall. She lives at assisted living facility at [facility]; caregivers reported mechanical fall. CT pelvis shows acute nondisplaced right superior pubic ramus fracture with right sidewall/extraperitoneal hemorrhage. Her hemoglobin was stable. Orthopedics evaluated the patient and recommended conservative care, WBAT. She was seen by PT/OT and recommended SNF placement. She had echocardiogram done- which revealed normal EF, and no regional wall motion abnormalities. She did not have any arrhythmias during her stay. She is medically stable to be discharged.    Awaiting call back.      Naya Beyer, DO

## 2024-03-08 NOTE — UM PHYSICIAN REVIEW NOTE
Peer to peer    Denial upheld with peer to peer with Dr. Oropeza. Send for appeal.      Naya Beyer, DO

## 2024-10-08 ENCOUNTER — HOSPITAL ENCOUNTER (INPATIENT)
Facility: HOSPITAL | Age: 89
LOS: 8 days | Discharge: NURSING, REHAB OR LONG-TERM CARE | DRG: 383 | End: 2024-10-16
Attending: EMERGENCY MEDICINE | Admitting: INTERNAL MEDICINE
Payer: COMMERCIAL

## 2024-10-08 ENCOUNTER — APPOINTMENT (EMERGENCY)
Dept: RADIOLOGY | Facility: HOSPITAL | Age: 89
DRG: 383 | End: 2024-10-08
Attending: PHYSICIAN ASSISTANT
Payer: COMMERCIAL

## 2024-10-08 DIAGNOSIS — F22 DELUSION (CMS/HCC): Primary | ICD-10-CM

## 2024-10-08 DIAGNOSIS — D64.9 ANEMIA, UNSPECIFIED TYPE: ICD-10-CM

## 2024-10-08 DIAGNOSIS — R60.9 EDEMA, UNSPECIFIED TYPE: ICD-10-CM

## 2024-10-08 DIAGNOSIS — R44.3 HALLUCINATION: ICD-10-CM

## 2024-10-08 DIAGNOSIS — L97.401: ICD-10-CM

## 2024-10-08 DIAGNOSIS — D50.0 IRON DEFICIENCY ANEMIA DUE TO CHRONIC BLOOD LOSS: ICD-10-CM

## 2024-10-08 LAB
ABO + RH BLD: NORMAL
ABO + RH BLD: NORMAL
ACANTHOCYTES BLD QL SMEAR: ABNORMAL
ALBUMIN SERPL-MCNC: 3.4 G/DL (ref 3.5–5.7)
ALP SERPL-CCNC: 126 IU/L (ref 34–125)
ALT SERPL-CCNC: 12 IU/L (ref 7–52)
ANION GAP SERPL CALC-SCNC: 8 MEQ/L (ref 3–15)
ANISOCYTOSIS BLD QL SMEAR: ABNORMAL
AST SERPL-CCNC: 15 IU/L (ref 13–39)
BACTERIA URNS QL MICRO: ABNORMAL /HPF
BASOPHILS # BLD: 0.04 K/UL (ref 0.01–0.1)
BASOPHILS NFR BLD: 0.7 %
BILIRUB SERPL-MCNC: 0.6 MG/DL (ref 0.3–1.2)
BILIRUB UR QL STRIP.AUTO: NEGATIVE MG/DL
BLD GP AB SCN SERPL QL: NEGATIVE
BNP SERPL-MCNC: 171 PG/ML
BUN SERPL-MCNC: 15 MG/DL (ref 7–25)
CALCIUM SERPL-MCNC: 8.5 MG/DL (ref 8.6–10.3)
CHLORIDE SERPL-SCNC: 104 MEQ/L (ref 98–107)
CLARITY UR REFRACT.AUTO: CLEAR
CO2 SERPL-SCNC: 24 MEQ/L (ref 21–31)
COLOR UR AUTO: YELLOW
CREAT SERPL-MCNC: 0.6 MG/DL (ref 0.6–1.2)
D AG BLD QL: POSITIVE
D AG BLD QL: POSITIVE
DACRYOCYTES BLD QL SMEAR: ABNORMAL
DIFFERENTIAL METHOD BLD: ABNORMAL
EGFRCR SERPLBLD CKD-EPI 2021: >60 ML/MIN/1.73M*2
EOSINOPHIL # BLD: 0.11 K/UL (ref 0.04–0.36)
EOSINOPHIL NFR BLD: 1.8 %
ERYTHROCYTE [DISTWIDTH] IN BLOOD BY AUTOMATED COUNT: 15 % (ref 11.7–14.4)
GLUCOSE SERPL-MCNC: 109 MG/DL (ref 70–99)
GLUCOSE UR STRIP.AUTO-MCNC: NEGATIVE MG/DL
HCT VFR BLD AUTO: 21.7 % (ref 35–45)
HGB BLD-MCNC: 6.7 G/DL (ref 11.8–15.7)
HGB UR QL STRIP.AUTO: NEGATIVE
HYALINE CASTS #/AREA URNS LPF: ABNORMAL /LPF
HYPOCHROMIA BLD QL SMEAR: ABNORMAL
IMM GRANULOCYTES # BLD AUTO: 0.03 K/UL (ref 0–0.08)
IMM GRANULOCYTES NFR BLD AUTO: 0.5 %
KETONES UR STRIP.AUTO-MCNC: 1 MG/DL
LABORATORY COMMENT REPORT: NORMAL
LEUKOCYTE ESTERASE UR QL STRIP.AUTO: NEGATIVE
LYMPHOCYTES # BLD: 0.83 K/UL (ref 1.2–3.5)
LYMPHOCYTES NFR BLD: 13.7 %
MCH RBC QN AUTO: 26.7 PG (ref 28–33.2)
MCHC RBC AUTO-ENTMCNC: 30.9 G/DL (ref 32.2–35.5)
MCV RBC AUTO: 86.5 FL (ref 83–98)
MONOCYTES # BLD: 0.42 K/UL (ref 0.28–0.8)
MONOCYTES NFR BLD: 6.9 %
MUCOUS THREADS URNS QL MICRO: 2 /LPF
NEUTROPHILS # BLD: 4.63 K/UL (ref 1.7–7)
NEUTS SEG NFR BLD: 76.4 %
NITRITE UR QL STRIP.AUTO: NEGATIVE
NRBC BLD-RTO: 0 %
PH UR STRIP.AUTO: 6.5 [PH]
PLAT MORPH BLD: NORMAL
PLATELET # BLD AUTO: 528 K/UL (ref 150–369)
PLATELET # BLD EST: ABNORMAL 10*3/UL
PMV BLD AUTO: 9.4 FL (ref 9.4–12.3)
POLYCHROMASIA BLD QL SMEAR: ABNORMAL
POTASSIUM SERPL-SCNC: 3.6 MEQ/L (ref 3.5–5.1)
PROT SERPL-MCNC: 6.5 G/DL (ref 6–8.2)
PROT UR QL STRIP.AUTO: ABNORMAL
RBC # BLD AUTO: 2.51 M/UL (ref 3.93–5.22)
RBC #/AREA URNS HPF: ABNORMAL /HPF
SODIUM SERPL-SCNC: 136 MEQ/L (ref 136–145)
SP GR UR REFRACT.AUTO: 1.02
SPECIMEN EXP DATE BLD: NORMAL
SQUAMOUS URNS QL MICRO: ABNORMAL /HPF
STOMATOCYTES BLD QL SMEAR: ABNORMAL
TARGETS BLD QL SMEAR: ABNORMAL
TROPONIN I SERPL HS-MCNC: 17 PG/ML
TROPONIN I SERPL HS-MCNC: 17.8 PG/ML
UROBILINOGEN UR STRIP-ACNC: 0.2 EU/DL
WBC # BLD AUTO: 6.06 K/UL (ref 3.8–10.5)
WBC #/AREA URNS HPF: ABNORMAL /HPF

## 2024-10-08 PROCEDURE — P9016 RBC LEUKOCYTES REDUCED: HCPCS

## 2024-10-08 PROCEDURE — 86923 COMPATIBILITY TEST ELECTRIC: CPT

## 2024-10-08 PROCEDURE — 99222 1ST HOSP IP/OBS MODERATE 55: CPT | Performed by: INTERNAL MEDICINE

## 2024-10-08 PROCEDURE — 86850 RBC ANTIBODY SCREEN: CPT

## 2024-10-08 PROCEDURE — 80053 COMPREHEN METABOLIC PANEL: CPT | Performed by: EMERGENCY MEDICINE

## 2024-10-08 PROCEDURE — 82607 VITAMIN B-12: CPT | Performed by: INTERNAL MEDICINE

## 2024-10-08 PROCEDURE — 80053 COMPREHEN METABOLIC PANEL: CPT

## 2024-10-08 PROCEDURE — 84443 ASSAY THYROID STIM HORMONE: CPT | Performed by: INTERNAL MEDICINE

## 2024-10-08 PROCEDURE — 85025 COMPLETE CBC W/AUTO DIFF WBC: CPT | Performed by: EMERGENCY MEDICINE

## 2024-10-08 PROCEDURE — 84484 ASSAY OF TROPONIN QUANT: CPT | Performed by: EMERGENCY MEDICINE

## 2024-10-08 PROCEDURE — 36415 COLL VENOUS BLD VENIPUNCTURE: CPT

## 2024-10-08 PROCEDURE — 99284 EMERGENCY DEPT VISIT MOD MDM: CPT | Mod: 25

## 2024-10-08 PROCEDURE — 81001 URINALYSIS AUTO W/SCOPE: CPT | Performed by: EMERGENCY MEDICINE

## 2024-10-08 PROCEDURE — 12000000 HC ROOM AND CARE MED/SURG

## 2024-10-08 PROCEDURE — 70450 CT HEAD/BRAIN W/O DYE: CPT

## 2024-10-08 PROCEDURE — 83880 ASSAY OF NATRIURETIC PEPTIDE: CPT | Performed by: EMERGENCY MEDICINE

## 2024-10-08 PROCEDURE — 82746 ASSAY OF FOLIC ACID SERUM: CPT | Performed by: INTERNAL MEDICINE

## 2024-10-08 PROCEDURE — 1124F ACP DISCUSS-NO DSCNMKR DOCD: CPT | Performed by: INTERNAL MEDICINE

## 2024-10-08 PROCEDURE — 93005 ELECTROCARDIOGRAM TRACING: CPT | Performed by: EMERGENCY MEDICINE

## 2024-10-08 PROCEDURE — 83550 IRON BINDING TEST: CPT | Performed by: INTERNAL MEDICINE

## 2024-10-08 PROCEDURE — 84484 ASSAY OF TROPONIN QUANT: CPT | Mod: 91 | Performed by: EMERGENCY MEDICINE

## 2024-10-08 PROCEDURE — 83010 ASSAY OF HAPTOGLOBIN QUANT: CPT | Performed by: INTERNAL MEDICINE

## 2024-10-08 PROCEDURE — 82728 ASSAY OF FERRITIN: CPT | Performed by: PHYSICIAN ASSISTANT

## 2024-10-08 RX ORDER — SODIUM CHLORIDE 9 MG/ML
5 INJECTION, SOLUTION INTRAVENOUS AS NEEDED
Status: ACTIVE | OUTPATIENT
Start: 2024-10-08 | End: 2024-10-09

## 2024-10-08 ASSESSMENT — ENCOUNTER SYMPTOMS
SINUS PRESSURE: 0
LIGHT-HEADEDNESS: 0
BACK PAIN: 0
PALPITATIONS: 0
DYSURIA: 0
SHORTNESS OF BREATH: 0
CHEST TIGHTNESS: 0
HALLUCINATIONS: 1
CHILLS: 0
FEVER: 0
COUGH: 0
VOMITING: 0
HEADACHES: 0
DIARRHEA: 0
DIZZINESS: 0
ABDOMINAL PAIN: 0
FREQUENCY: 0
FATIGUE: 0
SORE THROAT: 0
SINUS PAIN: 0
NAUSEA: 0

## 2024-10-09 ENCOUNTER — APPOINTMENT (INPATIENT)
Dept: RADIOLOGY | Facility: HOSPITAL | Age: 89
DRG: 383 | End: 2024-10-09
Attending: INTERNAL MEDICINE
Payer: COMMERCIAL

## 2024-10-09 ENCOUNTER — APPOINTMENT (INPATIENT)
Dept: RADIOLOGY | Facility: HOSPITAL | Age: 89
DRG: 383 | End: 2024-10-09
Attending: PHYSICIAN ASSISTANT
Payer: COMMERCIAL

## 2024-10-09 PROBLEM — D50.9 IRON DEFICIENCY ANEMIA: Status: ACTIVE | Noted: 2024-10-09

## 2024-10-09 PROBLEM — D64.9 ANEMIA: Status: ACTIVE | Noted: 2024-10-09

## 2024-10-09 PROBLEM — R44.3 HALLUCINATION: Status: ACTIVE | Noted: 2024-10-08

## 2024-10-09 PROBLEM — R60.9 EDEMA: Status: ACTIVE | Noted: 2024-10-09

## 2024-10-09 PROBLEM — M25.432 PAIN AND SWELLING OF LEFT WRIST: Status: ACTIVE | Noted: 2024-10-09

## 2024-10-09 PROBLEM — M25.532 PAIN AND SWELLING OF LEFT WRIST: Status: ACTIVE | Noted: 2024-10-09

## 2024-10-09 LAB
ANION GAP SERPL CALC-SCNC: 9 MEQ/L (ref 3–15)
BUN SERPL-MCNC: 13 MG/DL (ref 7–25)
CALCIUM SERPL-MCNC: 7.9 MG/DL (ref 8.6–10.3)
CHLORIDE SERPL-SCNC: 105 MEQ/L (ref 98–107)
CO2 SERPL-SCNC: 23 MEQ/L (ref 21–31)
CREAT SERPL-MCNC: 0.6 MG/DL (ref 0.6–1.2)
EGFRCR SERPLBLD CKD-EPI 2021: >60 ML/MIN/1.73M*2
ERYTHROCYTE [DISTWIDTH] IN BLOOD BY AUTOMATED COUNT: 14.7 % (ref 11.7–14.4)
FERRITIN SERPL-MCNC: 18 NG/ML (ref 11–250)
FOLATE SERPL-MCNC: 12.4 NG/ML
GLUCOSE SERPL-MCNC: 72 MG/DL (ref 70–99)
HAPTOGLOB SERPL-MCNC: 225 MG/DL (ref 41–203)
HCT VFR BLD AUTO: 23.2 % (ref 35–45)
HGB BLD-MCNC: 7.4 G/DL (ref 11.8–15.7)
HGB RETIC QN AUTO: 21.6 PG (ref 31.9–37)
IRON SATN MFR SERPL: 4 % (ref 15–45)
IRON SERPL-MCNC: 14 UG/DL (ref 35–150)
MAGNESIUM SERPL-MCNC: 1.9 MG/DL (ref 1.8–2.5)
MCH RBC QN AUTO: 27.4 PG (ref 28–33.2)
MCHC RBC AUTO-ENTMCNC: 31.9 G/DL (ref 32.2–35.5)
MCV RBC AUTO: 85.9 FL (ref 83–98)
PLATELET # BLD AUTO: 430 K/UL (ref 150–369)
PMV BLD AUTO: 9.7 FL (ref 9.4–12.3)
POTASSIUM SERPL-SCNC: 3.5 MEQ/L (ref 3.5–5.1)
RBC # BLD AUTO: 2.7 M/UL (ref 3.93–5.22)
RETICS #: 0.07 M/UL (ref 0–0.12)
RETICS/RBC NFR: 2.52 % (ref 0.6–2.8)
SODIUM SERPL-SCNC: 137 MEQ/L (ref 136–145)
TIBC SERPL-MCNC: 396 UG/DL (ref 270–460)
TSH SERPL DL<=0.05 MIU/L-ACNC: 1.56 MIU/L (ref 0.34–5.6)
UIBC SERPL-MCNC: 382 UG/DL (ref 180–360)
VIT B12 SERPL-MCNC: 900 PG/ML (ref 180–914)
WBC # BLD AUTO: 6.35 K/UL (ref 3.8–10.5)

## 2024-10-09 PROCEDURE — 99222 1ST HOSP IP/OBS MODERATE 55: CPT | Performed by: INTERNAL MEDICINE

## 2024-10-09 PROCEDURE — 85046 RETICYTE/HGB CONCENTRATE: CPT | Performed by: INTERNAL MEDICINE

## 2024-10-09 PROCEDURE — 63700000 HC SELF-ADMINISTRABLE DRUG: Performed by: INTERNAL MEDICINE

## 2024-10-09 PROCEDURE — 63600105 HC IODINE BASED CONTRAST: Mod: JZ | Performed by: PHYSICIAN ASSISTANT

## 2024-10-09 PROCEDURE — 25800000 HC PHARMACY IV SOLUTIONS: Performed by: INTERNAL MEDICINE

## 2024-10-09 PROCEDURE — 63600000 HC DRUGS/DETAIL CODE: Mod: JZ | Performed by: INTERNAL MEDICINE

## 2024-10-09 PROCEDURE — 85027 COMPLETE CBC AUTOMATED: CPT | Performed by: INTERNAL MEDICINE

## 2024-10-09 PROCEDURE — 73100 X-RAY EXAM OF WRIST: CPT | Mod: LT

## 2024-10-09 PROCEDURE — 74177 CT ABD & PELVIS W/CONTRAST: CPT

## 2024-10-09 PROCEDURE — 80048 BASIC METABOLIC PNL TOTAL CA: CPT | Performed by: INTERNAL MEDICINE

## 2024-10-09 PROCEDURE — 12000000 HC ROOM AND CARE MED/SURG

## 2024-10-09 PROCEDURE — 85045 AUTOMATED RETICULOCYTE COUNT: CPT | Performed by: INTERNAL MEDICINE

## 2024-10-09 PROCEDURE — 36415 COLL VENOUS BLD VENIPUNCTURE: CPT | Performed by: INTERNAL MEDICINE

## 2024-10-09 PROCEDURE — 83735 ASSAY OF MAGNESIUM: CPT | Performed by: INTERNAL MEDICINE

## 2024-10-09 PROCEDURE — 99233 SBSQ HOSP IP/OBS HIGH 50: CPT | Performed by: INTERNAL MEDICINE

## 2024-10-09 PROCEDURE — 93970 EXTREMITY STUDY: CPT

## 2024-10-09 PROCEDURE — 88185 FLOWCYTOMETRY/TC ADD-ON: CPT | Performed by: INTERNAL MEDICINE

## 2024-10-09 RX ORDER — BETHANECHOL CHLORIDE 10 MG/1
10 TABLET ORAL 3 TIMES DAILY
Status: DISCONTINUED | OUTPATIENT
Start: 2024-10-09 | End: 2024-10-10

## 2024-10-09 RX ORDER — TRAMADOL HYDROCHLORIDE 50 MG/1
25 TABLET ORAL EVERY 8 HOURS PRN
COMMUNITY

## 2024-10-09 RX ORDER — AMMONIUM LACTATE 12 G/100G
CREAM TOPICAL 2 TIMES DAILY
Status: DISCONTINUED | OUTPATIENT
Start: 2024-10-09 | End: 2024-10-16 | Stop reason: HOSPADM

## 2024-10-09 RX ORDER — PRAVASTATIN SODIUM 20 MG/1
20 TABLET ORAL NIGHTLY
Status: DISCONTINUED | OUTPATIENT
Start: 2024-10-09 | End: 2024-10-16 | Stop reason: HOSPADM

## 2024-10-09 RX ORDER — PANTOPRAZOLE SODIUM 20 MG/1
20 TABLET, DELAYED RELEASE ORAL 2 TIMES DAILY
Status: DISCONTINUED | OUTPATIENT
Start: 2024-10-09 | End: 2024-10-11

## 2024-10-09 RX ORDER — IOPAMIDOL 755 MG/ML
100 INJECTION, SOLUTION INTRAVASCULAR
Status: COMPLETED | OUTPATIENT
Start: 2024-10-09 | End: 2024-10-09

## 2024-10-09 RX ORDER — AMOXICILLIN 250 MG
2 CAPSULE ORAL DAILY PRN
Status: DISCONTINUED | OUTPATIENT
Start: 2024-10-09 | End: 2024-10-16 | Stop reason: HOSPADM

## 2024-10-09 RX ORDER — IBUPROFEN 200 MG
16-32 TABLET ORAL AS NEEDED
Status: DISCONTINUED | OUTPATIENT
Start: 2024-10-09 | End: 2024-10-10

## 2024-10-09 RX ORDER — OLANZAPINE 5 MG/1
5 TABLET, ORALLY DISINTEGRATING ORAL 2 TIMES DAILY PRN
Status: DISCONTINUED | OUTPATIENT
Start: 2024-10-09 | End: 2024-10-09

## 2024-10-09 RX ORDER — ACETAMINOPHEN 325 MG/1
650 TABLET ORAL EVERY 4 HOURS PRN
Status: DISCONTINUED | OUTPATIENT
Start: 2024-10-09 | End: 2024-10-16 | Stop reason: HOSPADM

## 2024-10-09 RX ORDER — DEXTROSE 40 %
15-30 GEL (GRAM) ORAL AS NEEDED
Status: DISCONTINUED | OUTPATIENT
Start: 2024-10-09 | End: 2024-10-10

## 2024-10-09 RX ORDER — DEXTROSE 50 % IN WATER (D50W) INTRAVENOUS SYRINGE
25 AS NEEDED
Status: DISCONTINUED | OUTPATIENT
Start: 2024-10-09 | End: 2024-10-10

## 2024-10-09 RX ORDER — POTASSIUM CHLORIDE 20 MEQ/1
40 TABLET, EXTENDED RELEASE ORAL ONCE
Status: COMPLETED | OUTPATIENT
Start: 2024-10-09 | End: 2024-10-09

## 2024-10-09 RX ORDER — ENOXAPARIN SODIUM 100 MG/ML
40 INJECTION SUBCUTANEOUS
Status: DISCONTINUED | OUTPATIENT
Start: 2024-10-09 | End: 2024-10-16 | Stop reason: HOSPADM

## 2024-10-09 RX ORDER — DOCUSATE SODIUM 100 MG/1
100 CAPSULE, LIQUID FILLED ORAL DAILY
COMMUNITY
End: 2024-10-16 | Stop reason: HOSPADM

## 2024-10-09 RX ORDER — FLUTICASONE PROPIONATE 50 MCG
1 SPRAY, SUSPENSION (ML) NASAL DAILY
Status: DISCONTINUED | OUTPATIENT
Start: 2024-10-09 | End: 2024-10-16 | Stop reason: HOSPADM

## 2024-10-09 RX ORDER — AMLODIPINE BESYLATE 2.5 MG/1
2.5 TABLET ORAL DAILY
Status: DISCONTINUED | OUTPATIENT
Start: 2024-10-09 | End: 2024-10-09

## 2024-10-09 RX ORDER — LEVOTHYROXINE SODIUM 88 UG/1
88 TABLET ORAL
Status: DISCONTINUED | OUTPATIENT
Start: 2024-10-09 | End: 2024-10-16 | Stop reason: HOSPADM

## 2024-10-09 RX ADMIN — Medication: at 20:27

## 2024-10-09 RX ADMIN — IOPAMIDOL 100 ML: 755 INJECTION, SOLUTION INTRAVENOUS at 11:52

## 2024-10-09 RX ADMIN — MAGNESIUM SULFATE IN DEXTROSE 1 G: 10 INJECTION, SOLUTION INTRAVENOUS at 10:17

## 2024-10-09 RX ADMIN — FLUTICASONE PROPIONATE 1 SPRAY: 50 SPRAY, METERED NASAL at 10:03

## 2024-10-09 RX ADMIN — ENOXAPARIN SODIUM 40 MG: 40 INJECTION SUBCUTANEOUS at 18:17

## 2024-10-09 RX ADMIN — POTASSIUM CHLORIDE 40 MEQ: 1500 TABLET, EXTENDED RELEASE ORAL at 10:03

## 2024-10-09 RX ADMIN — PANTOPRAZOLE SODIUM 20 MG: 20 TABLET, DELAYED RELEASE ORAL at 10:03

## 2024-10-09 RX ADMIN — SODIUM CHLORIDE 125 MG: 9 INJECTION, SOLUTION INTRAVENOUS at 10:17

## 2024-10-09 RX ADMIN — PANTOPRAZOLE SODIUM 20 MG: 20 TABLET, DELAYED RELEASE ORAL at 20:27

## 2024-10-09 RX ADMIN — PANTOPRAZOLE SODIUM 20 MG: 20 TABLET, DELAYED RELEASE ORAL at 02:23

## 2024-10-09 RX ADMIN — PRAVASTATIN SODIUM 20 MG: 20 TABLET ORAL at 22:44

## 2024-10-09 RX ADMIN — Medication: at 10:18

## 2024-10-09 RX ADMIN — ACETAMINOPHEN 325MG 650 MG: 325 TABLET ORAL at 18:17

## 2024-10-09 RX ADMIN — LEVOTHYROXINE SODIUM 88 MCG: 0.09 TABLET ORAL at 05:55

## 2024-10-09 ASSESSMENT — COGNITIVE AND FUNCTIONAL STATUS - GENERAL
CLIMB 3 TO 5 STEPS WITH RAILING: 1 - TOTAL
STANDING UP FROM CHAIR USING ARMS: 1 - TOTAL
WALKING IN HOSPITAL ROOM: 1 - TOTAL
WALKING IN HOSPITAL ROOM: 1 - TOTAL
STANDING UP FROM CHAIR USING ARMS: 1 - TOTAL
STANDING UP FROM CHAIR USING ARMS: 1 - TOTAL
WALKING IN HOSPITAL ROOM: 1 - TOTAL
MOVING TO AND FROM BED TO CHAIR: 1 - TOTAL
CLIMB 3 TO 5 STEPS WITH RAILING: 1 - TOTAL
MOVING TO AND FROM BED TO CHAIR: 1 - TOTAL
MOVING TO AND FROM BED TO CHAIR: 1 - TOTAL
CLIMB 3 TO 5 STEPS WITH RAILING: 1 - TOTAL

## 2024-10-09 NOTE — PROGRESS NOTES
Hospital Medicine Service -  Daily Progress Note       SUBJECTIVE   Interval History:   10/9  This morning, the patient complains of pain in her legs.  No other complaints.  She states she was getting short of breath at her facility.  She was also hallucinating, seeing people and red spots.  Discussed with patient regarding the plan.  Plan is for GI to see the patient, and they will determine if she should undergo further workup.  Also discussed her lower extremity wounds.  Finally, discussed her left wrist swelling.     OBJECTIVE      Vital signs in last 24 hours:  Temp:  [35.8 °C (96.5 °F)-36.6 °C (97.9 °F)] 36.6 °C (97.9 °F)  Heart Rate:  [66-97] 80  Resp:  [14-24] 20  BP: (126-189)/(58-78) 134/64    Intake/Output Summary (Last 24 hours) at 10/9/2024 0838  Last data filed at 10/9/2024 0705  Gross per 24 hour   Intake 305 ml   Output 200 ml   Net 105 ml       PHYSICAL EXAMINATION        Constitutional: Awake, alert.  Mild distress.  HEMNT: Mucous membranes moist.  Head: Normocephalic, atraumatic.  Eyes: EOM normal.  Positive pallor seen.  Neck: Supple.  Cardiovascular: Normal rate and regular rhythm. No murmur heard.  Pulmonary/Chest: Effort normal, breath sounds normal. No respiratory distress. No wheezes. No rales.  Abdominal: Soft. No tenderness. No rebound.  Musculoskeletal: Slight edema left wrist, with some pain.  Bilateral lower extremity moderate edema with tenderness.  Neurological: Awake, alert, oriented x 3.  Hard of hearing.  Globally weak.  Skin: Positive pallor.  Chronic venous stasis rash seen bilateral lower extremities.     LINES, CATHETERS, DRAINS, AIRWAYS, AND WOUNDS   Lines, Drains, and Airways:  Wounds (agree with documentation and present on admission):  Peripheral IV (Adult) 10/08/24 Right Antecubital (Active)   Number of days: 1       Peripheral IV (Adult) 10/08/24 Anterior;Left;Proximal Forearm (Active)   Number of days: 1            LABS / IMAGING / TELE      Labs  BMP Results          10/09/24 10/08/24 08/28/24     0555 1922 0707     136 140    K 3.5 3.6 3.9    Cl 105 104 103    CO2 23 24 29    Glucose 72 109 96    BUN 13 15 40    Creatinine 0.6 0.6 0.8    Calcium 7.9 8.5 8.9    Anion Gap 9 8 8    EGFR >60.0 >60.0 >60.0           Comment for K at 0555 on 10/09/24: Results obtained on plasma. Plasma Potassium values may be up to 0.4 mEQ/L less than serum values. The differences may be greater for patients with high platelet or white cell counts.    Comment for K at 1922 on 10/08/24: Results obtained on plasma. Plasma Potassium values may be up to 0.4 mEQ/L less than serum values. The differences may be greater for patients with high platelet or white cell counts.    Comment for EGFR at 0555 on 10/09/24: Calculation based on the Chronic Kidney Disease Epidemiology Collaboration (CKD-EPI) equation refit without adjustment for race.    Comment for EGFR at 1922 on 10/08/24: Calculation based on the Chronic Kidney Disease Epidemiology Collaboration (CKD-EPI) equation refit without adjustment for race.    Comment for EGFR at 0707 on 08/28/24: Calculation based on the Chronic Kidney Disease Epidemiology Collaboration (CKD-EPI) equation refit without adjustment for race.            CBC Results         10/09/24 10/08/24 06/04/24     0555 1922 0750    WBC 6.35 6.06 6.75    RBC 2.70 2.51 4.35    HGB 7.4 6.7 12.1    HCT 23.2 21.7 37.8    MCV 85.9 86.5 86.9    MCH 27.4 26.7 27.8    MCHC 31.9 30.9 32.0     528 447           Comment for HGB at 1922 on 10/08/24: This result has been called to Myrna WRIGHT by Camille on 10/08/2024 19:59:30, and has been read back.               Lab work reviewed by myself      ASSESSMENT AND PLAN      * Hallucination  Assessment & Plan  Patient was expressing paranoid thoughts which is unusual for her  Normal UA and normal CT scan of the brain  She is in a new location and is hard of hearing and probably visually impaired as well  These could cause confusion  We  will start some PT and OT tomorrow  Zyprexa as needed if she becomes delirious although presently she appears to be calm and oriented    10/9  Hold bethanechol.  Monitor closely.    Pain and swelling of left wrist  Assessment & Plan  10/9  Patient states she fell recently.  Check x-ray to rule out fracture.    Edema  Assessment & Plan  10/9  Probably secondary to Norvasc use.  Check ultrasound rule out DVT.  Start AmLactin, Ace wrap.    Iron deficiency anemia  Assessment & Plan  Hemoglobin of 6.7  Her hemoglobin was 11 with her last admission to the hospital, this was in the setting of a pelvic ramus fracture  Normocytic  Mild thrombocytosis  Unclear etiology, she was heme-negative in the emergency room  They had started a blood transfusion before I could put in basic blood work however they did trial it as she was getting the blood transfusion  Follow-up on haptoglobin, iron level, B12 level  Hematology consultation  1 more stool for Hemoccult  Recheck CBC in the morning after receiving blood transfusion this evening  Empiric PPI in case this is associated GI loss    10/9  Severe iron deficiency anemia noted.  Hematology consulted by admitting team.  Will also consult GI.  Status post 1 unit PRBCs.  Hemoglobin 7.4 thereafter.  Start IV iron.    HTN (hypertension)  Assessment & Plan  Blood pressure is well-controlled continue Norvasc 2.5 mg daily    Hypothyroid  Assessment & Plan  TSH 4 months ago was within normal limits  Continue 88 mcg of Synthroid  Recheck TSH in setting of her paranoid delusions    10/9  Await TSH.         VTE Assessment: [place]    VTE Prophylaxis:  Current anticoagulants:  enoxaparin (LOVENOX) syringe 40 mg, subcutaneous, Daily (6p)      Code Status: Full Code      Estimated Discharge Date: 10/10/2024   Disposition Planning:   10/9  Hallucinations.  Possibly medicine related in the setting of new environment and patient being hard of hearing.  Hold bethanechol.  Monitor closely.    Severe iron  deficiency anemia.  Further workup may need to be done.  Consult GI.  Status post 1 unit PRBCs overnight.  Start IV iron.  Transfuse as needed to keep the hemoglobin greater than 7, or for symptoms.    Moderate edema with bilateral venous stasis.  Start AmLactin and Ace wrap.  Rule out DVT with ultrasound.    Total Time Spent in Patient's Care:  More than 50 minutes were spent at the time of this dictation, which included: reviewing HPI, PMHx, Social Hx, Fhx, VTE prophylaxis, medications, allergies, pertinent diagnostic studies in electronic medical record since this hospitalization; time spent in evaluating the patient and documenting in the medical record. Reviewed plan of care with nursing personnel. Time included counseling, coordinating care, discussing progress, prognosis and further plan of care with the patient.     Lazaro Salcedo MD  10/9/2024

## 2024-10-09 NOTE — ED PROVIDER NOTES
HPI    Chief Complaint   Patient presents with    Aggressive Behavior        HPI   Patient is a 89-year-old female who presents the emergency department for evaluation of mental status change, delusions, hallucinations.  Patient was in her normal state of health until several days ago.  Family states that she has had decreased p.o. intake of both solids and liquids.  Patient over the past 24 to 48 hours has begun having delusions.  States that she feels as though her son is out for her money and is planning to kill her.  She also states that she is hearing voices, but is unable to tell me what they are saying.  She is having visual hallucinations, stating that she is seeing red spots on everything.  Denies any chest pain or shortness of breath.  Denies any nausea or vomiting.  Denies any urinary symptoms.  Patient's family endorses that this is completely abnormal behavior for her.  She did recently go to live in an assisted living facility from her normal home.  This is occurred in the past couple of weeks.  Patient has no known psychiatric illness.      No past medical history on file.      No past surgical history on file.    No family history on file.         Systems Reviewed from Nursing Triage:               Review of Systems   Constitutional:  Negative for chills, fatigue and fever.   HENT:  Negative for congestion, ear pain, sinus pressure, sinus pain and sore throat.    Respiratory:  Negative for cough, chest tightness and shortness of breath.    Cardiovascular:  Negative for chest pain and palpitations.   Gastrointestinal:  Negative for abdominal pain, diarrhea, nausea and vomiting.   Genitourinary:  Negative for dysuria, frequency and urgency.   Musculoskeletal:  Negative for back pain.   Skin:  Negative for pallor and rash.   Neurological:  Negative for dizziness, light-headedness and headaches.   Psychiatric/Behavioral:  Positive for hallucinations.             ED Triage Vitals [10/08/24 1919]   Temp  Heart Rate Resp BP SpO2   36.1 °C (97 °F) 97 17 (!) 186/78 97 %      Temp src Heart Rate Source Patient Position BP Location FiO2 (%) (Set)   -- Monitor -- -- --       Vitals:    10/08/24 1919 10/08/24 1930   BP: (!) 186/78 (!) 184/77   Pulse: 97 80   Resp: 17 16   Temp: 36.1 °C (97 °F)    SpO2: 97% 98%                         Physical Exam  Vitals and nursing note reviewed.   Constitutional:       Appearance: She is well-developed.   HENT:      Head: Normocephalic and atraumatic.   Eyes:      Conjunctiva/sclera: Conjunctivae normal.   Cardiovascular:      Rate and Rhythm: Normal rate and regular rhythm.   Pulmonary:      Effort: Pulmonary effort is normal.      Breath sounds: Normal breath sounds.   Abdominal:      General: There is no distension.      Palpations: Abdomen is soft. There is no mass.      Tenderness: There is no abdominal tenderness.   Musculoskeletal:         General: No tenderness or deformity. Normal range of motion.      Cervical back: Normal range of motion.   Skin:     General: Skin is warm and dry.   Neurological:      General: No focal deficit present.      Mental Status: She is alert and oriented to person, place, and time. Mental status is at baseline.      Comments: Patient states that she truly feels as if her son is out to get her for her money and is going to try to kill her.  She states that she loves her son very much, and states that she cannot understand why he would feel this way but that she truly does feel like he is out to get her.   Psychiatric:         Behavior: Behavior normal.              CT HEAD WITHOUT IV CONTRAST    (Results Pending)   ECG 12 lead    (Results Pending)       Labs Reviewed   CBC AND DIFF - Abnormal       Result Value    WBC 6.06      RBC 2.51 (*)     Hemoglobin 6.7 (*)     Hematocrit 21.7 (*)     MCV 86.5      MCH 26.7 (*)     MCHC 30.9 (*)     RDW 15.0 (*)     Platelets 528 (*)     MPV 9.4      Differential Type Auto      nRBC 0.0      Immature  Granulocytes 0.5      Neutrophils 76.4      Lymphocytes 13.7      Monocytes 6.9      Eosinophils 1.8      Basophils 0.7      Immature Granulocytes, Absolute 0.03      Neutrophils, Absolute 4.63      Lymphocytes, Absolute 0.83 (*)     Monocytes, Absolute 0.42      Eosinophils, Absolute 0.11      Basophils, Absolute 0.04      PLT Morphology Normal      Platelet Estimate Increased (>400,000)      Anisocytosis 1+      Hypochromia Occasional      Acanthocytes Occasional      Polychromasia Occasional      Stomatocytes Occasional      Target Cells Occasional      Teardrop Cells Occasional     COMPREHENSIVE METABOLIC PANEL - Abnormal    Sodium 136      Potassium 3.6      Chloride 104      CO2 24      BUN 15      Creatinine 0.6      Glucose 109 (*)     Calcium 8.5 (*)     AST (SGOT) 15      ALT (SGPT) 12      Alkaline Phosphatase 126 (*)     Total Protein 6.5      Albumin 3.4 (*)     Bilirubin, Total 0.6      eGFR >60.0      Anion Gap 8     UA REFLEX CULTURE (MACROSCOPIC) - Abnormal    Color, Urine Yellow      Clarity, Urine Clear      Specific Gravity, Urine 1.022      pH, Urine 6.5      Leukocyte Esterase Negative      Nitrite, Urine Negative      Protein, Urine Trace (*)     Glucose, Urine Negative      Ketones, Urine +1 (*)     Urobilinogen, Urine 0.2      Bilirubin, Urine Negative      Blood, Urine Negative     HIGH SENSITIVE TROPONIN I (BASELINE - REFLEX 2HR) - Abnormal    High Sens Troponin I 17.8 (*)    B-TYPE NATRIURETIC PEPTIDE - Abnormal     (*)    UA MICROSCOPIC (UCU) - Abnormal    RBC, Urine 0 TO 4      WBC, Urine 0 TO 3      Squamous Epithelial Rare (*)     Hyaline Cast 3 TO 9 (*)     Bacteria, Urine Rare (*)     Mucus +2 (*)    URINALYSIS REFLEX CULTURE (ED AND OUTPATIENT ONLY)    Narrative:     The following orders were created for panel order UA with Reflex Culture.  Procedure                               Abnormality         Status                     ---------                                -----------         ------                     UA Reflex to Culture (Ma...[[id number]]  Abnormal            Final result               UA Microscopic[[id number]]               Abnormal            Final result                 Please view results for these tests on the individual orders.   TYPE AND SCREEN    Specimen Expiration 10/11/2024      Antibody Screen Negative      ABO O      Rh Factor Positive      History Check No type on file     EXTRA TUBES    Narrative:     The following orders were created for panel order Extra Tubes.  Procedure                               Abnormality         Status                     ---------                               -----------         ------                     Extra Tube Lt Green[[id number]]                              In process                   Please view results for these tests on the individual orders.   HIGH SENSITIVE TROPONIN I (NO REFLEX)   PREPARE RBC   EXTUB LT GREEN       CT HEAD WITHOUT IV CONTRAST    (Results Pending)   ECG 12 lead    (Results Pending)         Procedures    Final diagnoses:   None       ED Course & MDM     ED Course as of 10/08/24 2230   Tue Oct 08, 2024   2212 Patient states that she did have a mechanical fall at home several days ago.  States she was uninjured. [SC]      ED Course User Index  [SC] Myrna Oropeza PA C           Medical Decision Making      10:09 PM    Impression: Mental status change, delusions, hallucinations    Plan: Labs, imaging    Vital Signs Review: Vital signs have been reviewed. The oxygen saturation is SpO2: 97 % which is normal.    ADRIANA Stallings  10/8/2024           Myrna Oropeza PA C  10/08/24 2330       Myrna Oropeza PA C  10/08/24 2336

## 2024-10-09 NOTE — PLAN OF CARE
Problem: Adult Inpatient Plan of Care  Goal: Plan of Care Review  Outcome: Progressing  Flowsheets (Taken 10/9/2024 8364)  Progress: no change  Outcome Evaluation: Patient hard of hearing, cooperative and oriented, stated that she had a fall Saturday and having difficulty walking due to pain in her heels and toes, patien able to verbalized her needs, fallprecautions in place, call bell within reach  Plan of Care Reviewed With: patient  Goal: Patient-Specific Goal (Individualized)  Outcome: Progressing  Goal: Absence of Hospital-Acquired Illness or Injury  Outcome: Progressing  Goal: Optimal Comfort and Wellbeing  Outcome: Progressing  Goal: Readiness for Transition of Care  Outcome: Progressing     Problem: Skin Injury Risk Increased  Goal: Skin Health and Integrity  Outcome: Progressing     Problem: Fall Injury Risk  Goal: Absence of Fall and Fall-Related Injury  Outcome: Progressing   Plan of Care Review  Plan of Care Reviewed With: patient  Progress: no change  Outcome Evaluation: Patient hard of hearing, cooperative and oriented, stated that she had a fall Saturday and having difficulty walking due to pain in her heels and toes, patien able to verbalized her needs, fallprecautions in place, call bell within reach

## 2024-10-09 NOTE — CONSULTS
"Consult Note    Subjective     Sofie Stewart is a 89 y.o. female who was admitted for Delusion (CMS/Formerly [facility]) [F22]  Hallucination [R44.3]  Anemia, unspecified type [D64.9]  Edema, unspecified type [R60.9]. Patient was referred by Dr. Cooper for management recommendations. Patient is a 89-year-old woman who was brought to [facility] ED for an acute change in mental status.  She has no previous history of dementia.  There is no associated fevers, chills, night sweats.  She had no other specific complaints.    In the ED, CT of the head revealed no acute abnormality.  CBC revealed acute anemia with a hemoglobin of 6.7 g/dL 4 months ago in June, her hemoglobin was 12.1 g/dL fairly stable CMP.  Urinalysis revealed no evidence of urinary tract infection.  She was Hemoccult negative in the ED. she admits to progressive fatigue dizziness, lightheadedness with changing positions and at times seeing \"spots\" especially when she changes vision.  She denies hematuria, bright red blood per rectum but has recently described occasional dark stools but not tarry stools or black stools.  She denies abdominal pain.    She is received 1 unit packed RBC transfusion and a consult is in place to hematology for management recommendations.    Outside records reviewed. Pertinent radiology results reviewed. Pertinent lab results reviewed.    Medical History:   Past Medical History:   Diagnosis Date    Hypertension     Hypothyroidism     Lipid disorder        Surgical History: No past surgical history on file.    Allergies: Patient has no known allergies.    Current Facility-Administered Medications   Medication Dose Route Frequency Provider Last Rate Last Admin    acetaminophen (TYLENOL) tablet 650 mg  650 mg oral q4h PRN Sofie Cooper MD        ammonium lactate (AMLACTIN) 12 % cream   Topical BID Lazaro Salcedo MD   Given at 10/09/24 1018    [Provider Managed Hold] bethanechol (URECHOLINE) tablet 10 mg  10 mg oral TID Kenneth" Sofie BARRETO MD        glucose chewable tablet 16-32 g of dextrose  16-32 g of dextrose oral PRN Sofie Cooper MD        Or    dextrose 40 % oral gel 15-30 g of dextrose  15-30 g of dextrose oral PRN Sofie Cooper MD        Or    glucagon (GLUCAGEN) injection 1 mg  1 mg intramuscular PRN Sofie Cooper MD        Or    dextrose 50 % in water (D50) injection 12.5 g  25 mL intravenous PRN Sofie Cooper MD        enoxaparin (LOVENOX) syringe 40 mg  40 mg subcutaneous Daily (6p) Sofie Cooper MD        ferric gluconate (FERRLECIT) 125 mg in sodium chloride 0.9 % 100 mL IVPB  125 mg intravenous Daily Lazaro Salcedo MD   Stopped at 10/09/24 1117    fluticasone propionate (FLONASE) 50 mcg/actuation nasal spray 1 spray  1 spray Each Nostril Daily Sofie Cooper MD   1 spray at 10/09/24 1003    levothyroxine (SYNTHROID) tablet 88 mcg  88 mcg oral Daily (6:30a) Sofie Cooper MD   88 mcg at 10/09/24 0555    pantoprazole (PROTONIX) tablet,delayed release (DR/EC) 20 mg  20 mg oral BID Sofie Cooper MD   20 mg at 10/09/24 1003    pravastatin (PRAVACHOL) tablet 20 mg  20 mg oral Nightly Sofie Cooper MD        sennosides-docusate sodium (SENOKOT-S) 8.6-50 mg per tablet 2 tablet  2 tablet oral Daily PRN Sofie Cooper MD        sodium chloride 0.9 % infusion  5 mL/hr intravenous PRN Myrna Oropeza PA C              Social History:   Social History     Socioeconomic History    Marital status:      Spouse name: None    Number of children: None    Years of education: None    Highest education level: None   Tobacco Use    Smoking status: Never    Smokeless tobacco: Never     Social Drivers of Health     Food Insecurity: Patient Unable To Answer (10/8/2024)    Hunger Vital Sign     Worried About Running Out of Food in the Last Year: Patient unable to answer     Ran Out of Food in the Last Year: Patient unable to answer   Transportation Needs: No  Transportation Needs (10/9/2024)    PRAPARE - Transportation     Lack of Transportation (Medical): No     Lack of Transportation (Non-Medical): No   Housing Stability: Unknown (10/9/2024)    Housing Stability Vital Sign     Unable to Pay for Housing in the Last Year: No     Homeless in the Last Year: No       Family History: No family history on file.    Review of Systems  All other systems reviewed and negative except as noted in the HPI.    Vital signs in last 24 hours:  Temp:  [35.8 °C (96.5 °F)-36.7 °C (98 °F)] 36.2 °C (97.2 °F)  Heart Rate:  [66-97] 71  Resp:  [14-24] 18  BP: (126-189)/(58-78) 143/65    Objective     Physical Exam  General appearance: alert, appears stated age, and cooperative  Head: normocephalic, without obvious abnormality, atraumatic  Eyes: conjunctivae clear. PERRL, EOM's intact.  Neck: no adenopathy and supple, symmetrical, trachea midline  Lungs: clear to auscultation bilaterally  Heart: regular rate and rhythm  Abdomen: soft, non-tender; bowel sounds normal; no masses, no organomegaly  Extremities: +edema b/l.    Lymph nodes: Cervical and supraclavicular nodes normal. and Axillary nodes normal.  Neurologic: Alert and oriented to self        Labs  Results from last 7 days   Lab Units 10/09/24  0555 10/08/24  1922   WBC K/uL 6.35 6.06   HEMOGLOBIN g/dL 7.4* 6.7*   HEMATOCRIT % 23.2* 21.7*   PLATELETS K/uL 430* 528*   DIFF TYPE   --  Auto   NRBC %  --  0.0   IMM GRANULOCYTES %  --  0.5   NEUTROPHILS %  --  76.4   LYMPHOCYTES %  --  13.7   MONOCYTES %  --  6.9   EOSINOPHILS %  --  1.8   BASOPHILS %  --  0.7   IMM GRANUCOCYTES ABS K/uL  --  0.03   MONO ABS AUTO K/uL  --  0.42   EOS ABS AUTO K/uL  --  0.11   BASO ABS AUTO K/uL  --  0.04       Results from last 7 days   Lab Units 10/09/24  0555 10/08/24  1922   SODIUM mEQ/L 137 136   POTASSIUM mEQ/L 3.5 3.6   CHLORIDE mEQ/L 105 104   CO2 mEQ/L 23 24   BUN mg/dL 13 15   CREATININE mg/dL 0.6 0.6   CALCIUM mg/dL 7.9* 8.5*   ALBUMIN g/dL  --  3.4*    BILIRUBIN TOTAL mg/dL  --  0.6   ALK PHOS IU/L  --  126*   ALT IU/L  --  12   AST IU/L  --  15   GLUCOSE mg/dL 72 109*       Lab Results   Component Value Date    IRON 14 (L) 10/08/2024    TIBC 396 10/08/2024       Imaging  I have independently reviewed the pertinent imaging from the last 24 hrs.  CT HEAD WITHOUT IV CONTRAST    Result Date: 10/8/2024  Narrative: CLINICAL HISTORY:  fell, mental status change   . COMMENT: Comparison: 2/29/2024. Technique: CT examination of the brain was performed without contrast. Sagittal and coronal reconstructions were obtained. CT DOSE:  One or more dose reduction techniques (e.g. automated exposure control, adjustment of the mA and/or kV according to patient size, use of iterative reconstruction technique) was utilized for this examination. Findings: No acute intracranial hemorrhage. Gray-white differentiation is maintained. There is no evidence for mass or mass effect. Extensive patchy white matter hypodensity is present, nonspecific, most likely reflecting sequela of chronic small vessel ischemic disease at this age.  There is mild global cerebral volume loss, within expected limits for age. Ventricles and sulci are normal in size and configuration.  There is no evidence for hydrocephalus.  There is no abnormal extra-axial fluid collection.  There is calcified atherosclerosis within the intracranial internal carotid arteries and intradural vertebral arteries. Visualized paranasal sinuses and mastoid air cells are clear, Calvarium and skull base are intact. There is no suspicious osseous lesion. Scalp and facial soft tissues are unremarkable.     Impression: IMPRESSION: 1. There is no acute intracranial abnormality.  No intracranial hemorrhage or other evidence for traumatic injury. 2. Moderate microvascular ischemic white matter changes are present with moderate global cerebral volume loss.      Assessment   89 y.o. female being consulted for management recommendations        Plan     Iron deficiency anemia  Assessment & Plan  She is acute anemia that developed over the last 4 months. (12 g/dl > 6.7 g/dl)  She has normal white blood cell count with a fairly normal differential.  Platelet count is slightly elevated reactive to iron deficiency anemia  She has low iron, iron saturation.  Ferritin not sent.  Folate and B12 levels are normal  CMP shows normal renal function, normal total protein and fairly normal electrolytes.    Will check reticulocyte count and reticulated hemoglobin.  Addendum: Retic hemoglobin low at 21 consistent with iron deficiency anemia.  Reticulocyte count low or hypoproliferative  GI consulted and CT of the abdomen and pelvis ordered.  No acute pathology found.  ?  Endoscopic evaluation  Starting Ferrlecit 125 mg intravenously daily x 8 infusions  Will also send peripheral blood flow cytometry however suspicion for an underlying lymphoproliferative disorder low with findings of iron deficiency anemia.

## 2024-10-09 NOTE — PLAN OF CARE
Care Coordination Admission Assessment Note    General Information:  Readmission Within the last 30 days: no previous admission in last 30 days  Does patient have a :    Patient-Specific Goals (include timeframe):      Living Arrangements:  Arrived From: home  Current Living Arrangements: assisted living facility ([facility], personal care home)  People in Home: facility resident  Home Accessibility: wheelchair accessible  Living Arrangement Comments: [facility]    Housing Stability and Utility Access (SDOH):  In the last 12 months, was there a time when you were not able to pay the mortgage or rent on time?: No  In the past 12 months, how many times have you moved?:    At any time in the past 12 months, were you homeless or living in a shelter (including now)?: No  In the past 12 months has the electric, gas, oil, or water company threatened to shut off services in your home?: No    Functional Status Prior to Admission:   Assistive Device/Animal Currently Used at Home: wheelchair  Functional Status Comments: needs assistance with ADLS- she was a 1 person assist and could stand and pivot to wheelchair  IADL Comments: [facility] completes IADLS for her     Supports and Services:  Current Outpatient/Agency/Support Group: none  Type of Current Home Care Services:    History of home care episode or rehab stay:      Discharge Needs Assessment:   Concerns to be Addressed: care coordination/care conferences, discharge planning  Current Discharge Risk:    Anticipated Changes Related to Illness:      Patient/Family Anticipated Discharge Plan:  Patient/Family Anticipates Transition To: skilled nursing facility  Patient/Family Anticipated Services at Transition: none    Connection to Community  Patient declined offered resources.    Patient Choice:   Offered/Gave Vendor List:    Patient's Choice of Community Agency(s):         Anticipated Discharge Plan:  Met  with patient. Provided education and contact information for Care Coordination services.: yes  Anticipated Discharge Disposition: skilled nursing facility, nursing home/[facility] care     Transportation Needs (SDOH):  Transportation Concerns: no car  Transportation Anticipated: family or friend will provide  Is Out of Hospital DNR needed at discharge?: no    In the past 12 months, has lack of transportation kept you from medical appointments or from getting medications?: No  In the past 12 months, has lack of transportation kept you from meetings, work, or from getting things needed for daily living?: No    Concerns - comments: Spoke to Jorge of [facility] [phone] who stated that patient was able to stand and pivot with assist of one and would need to be an assist of 1 to accept back.  She stated that she did not think they were able to accommodate patient due to her increased needs as they are a Personal Care Home.  Patient was previously at [facility] in [facility] and then transferred to [facility] at the end of September 2024.  Call placed to patient's son Raymond who is POA and stated that he is not happy with the care they have provided.  He is currently in the process of looking at nursing facilities for her to transfer to because she will require more assistance.

## 2024-10-09 NOTE — ASSESSMENT & PLAN NOTE
- Hemoglobin of 6.7 on admission > s/p 1 unit PRBC, hgb 7.4 thereafter  - Hgb 7.9 today  - GI and heme/onc on board  - S/p EGD yesterday showing multiple duodenal ulcers and gastritis.  On PPI.  - Patient's son wishes to avoid colonoscopy since a potential answer for the iron deficiency anemia has been ascertained.  GI did warn the son that they would not be able to rule out colon cancer without the colonoscopy.  He is okay with that.  - Continue IV iron while hospitalized, then start oral iron supplementation at the time of discharge.

## 2024-10-09 NOTE — H&P
...   Hospital Medicine  History & Physical        CHIEF COMPLAINT      Mental status changes, confusion   HISTORY OF PRESENT ILLNESS      Sofie Stewart is a 89 y.o. female with a past medical history of hypertension and hypercholesterolemia who recently was transition to assisted living from independent living.  She began having delusions and hallucinations at the assisted living.  This is unlike her as she does not have a history of dementia or mental health issues.  She was expressing thoughts that she thought her son was out to get her money and planning to kill her.  She also states that she was hearing voices and having visual hallucinations where she was seeing red spots.  When I saw her she was trying to sleep.  She is extraordinarily hard of hearing.  She appears to be lucid for the ER staff and for the nursing staff.  The family had expressed to the ER that this is unusual behavior for her and only started after she moved to assisted living the last couple weeks.  The patient had a negative evaluation in the emergency room but was severely anemic with a hemoglobin of 6.7.  The patient states that she may have been having dark's stools but she is not sure.  She was heme-negative in the emergency room.  PAST MEDICAL AND SURGICAL HISTORY      Past Medical History:   Diagnosis Date    Hypertension     Hypothyroidism     Lipid disorder        No past surgical history on file.    PCP: Brandi Weeks MD    MEDICATIONS      Prior to Admission medications    Medication Sig Start Date End Date Taking? Authorizing Provider   bethanechol (URECHOLINE) 10 mg tablet Take 10 mg by mouth 3 (three) times a day.   Yes Provider, [facility] MD Fredy   bisacodyL (DULCOLAX, BISACODYL,) 10 mg suppository Insert 1 suppository (10 mg total) into the rectum daily as needed for constipation for up to 10 days. Hold for diarrhea 3/4/24 10/8/24 Yes Erich Caballero MD   fluticasone propionate (FLONASE) 50 mcg/actuation nasal  spray Administer 1 spray into each nostril daily.   Yes Provider, Brisa Daniel MD   levothyroxine (SYNTHROID) 88 mcg tablet Take 88 mcg by mouth daily.   Yes ProviderBrisa MD   lidocaine (ASPERCREME) 4 % adhesive patch,medicated topical patch Apply 1 patch topically daily for 7 days. Remove & discard patch within 12 hours or as directed by prescriber. 3/4/24 10/8/24 Yes Erich Caballero MD   pravastatin (PRAVACHOL) 20 mg tablet Take 20 mg by mouth nightly.   Yes ProviderBrisa MD   sennosides-docusate sodium (SENOKOT-S) 8.6-50 mg Take 2 tablets by mouth daily as needed for constipation.   Yes ProviderBrisa MD   amLODIPine (NORVASC) 2.5 mg tablet Take 2.5 mg by mouth daily.    Provider, Brisa Daniel MD       ALLERGIES      Patient has no known allergies.    FAMILY HISTORY      No family history on file.    SOCIAL HISTORY      Social History     Socioeconomic History    Marital status:      Spouse name: None    Number of children: None    Years of education: None    Highest education level: None   Tobacco Use    Smoking status: Never    Smokeless tobacco: Never     Social Drivers of Health     Food Insecurity: Patient Unable To Answer (10/8/2024)    Hunger Vital Sign     Worried About Running Out of Food in the Last Year: Patient unable to answer     Ran Out of Food in the Last Year: Patient unable to answer   Transportation Needs: No Transportation Needs (3/1/2024)    PRAPARE - Transportation     Lack of Transportation (Medical): No     Lack of Transportation (Non-Medical): No   Housing Stability: Unknown (3/1/2024)    Housing Stability Vital Sign     Unable to Pay for Housing in the Last Year: No     Unstable Housing in the Last Year: No       REVIEW OF SYSTEMS      All other systems reviewed and negative except as noted in HPI    PHYSICAL EXAMINATION      Temp:  [35.8 °C (96.5 °F)-36.1 °C (97 °F)] 36.1 °C (97 °F)  Heart Rate:  [75-97] 82  Resp:  [14-24] 24  BP:  (144-189)/(58-78) 144/60  There is no height or weight on file to calculate BMI.    Physical Exam  Constitutional:       Comments: Elderly female  Asleep but would wake up when I tapped her hand   HENT:      Head: Normocephalic.      Ears:      Comments: Very hard of hearing  Eyes:      Pupils: Pupils are equal, round, and reactive to light.   Cardiovascular:      Rate and Rhythm: Normal rate and regular rhythm.   Pulmonary:      Effort: Pulmonary effort is normal.      Breath sounds: Normal breath sounds.   Abdominal:      General: Bowel sounds are normal.      Palpations: Abdomen is soft.   Musculoskeletal:         General: Swelling present.      Cervical back: Normal range of motion.      Right lower leg: Edema present.      Left lower leg: Edema present.   Skin:     Comments: Scaling erythema of both legs   Neurological:      General: No focal deficit present.      Mental Status: She is oriented to person, place, and time.   Psychiatric:         Thought Content: Thought content normal.         LABS / IMAGING / EKG        Labs     Lab Results   Component Value Date    WBC 6.06 10/08/2024    HGB 6.7 (LL) 10/08/2024    HCT 21.7 (L) 10/08/2024    MCV 86.5 10/08/2024     (H) 10/08/2024      Lab Results   Component Value Date    GLUCOSE 109 (H) 10/08/2024    CALCIUM 8.5 (L) 10/08/2024     10/08/2024    K 3.6 10/08/2024    CO2 24 10/08/2024     10/08/2024    BUN 15 10/08/2024    CREATININE 0.6 10/08/2024        Imaging  Significant findings include:    IMPRESSION:  1. There is no acute intracranial abnormality.  No intracranial hemorrhage or  other evidence for traumatic injury.  2. Moderate microvascular ischemic white matter changes are present with  moderate global cerebral volume loss.         ASSESSMENT AND PLAN            Anemia  Assessment & Plan  Hemoglobin of 6.7  Her hemoglobin was 11 with her last admission to the hospital, this was in the setting of a pelvic ramus  fracture  Normocytic  Mild thrombocytosis  Unclear etiology, she was heme-negative in the emergency room  They had started a blood transfusion before I could put in basic blood work however they did trial it as she was getting the blood transfusion  Follow-up on haptoglobin, iron level, B12 level  Hematology consultation  1 more stool for Hemoccult  Recheck CBC in the morning after receiving blood transfusion this evening  Empiric PPI in case this is associated GI loss    HTN (hypertension)  Assessment & Plan  Blood pressure is well-controlled continue Norvasc 2.5 mg daily    Hypothyroid  Assessment & Plan  TSH 4 months ago was within normal limits  Continue 88 mcg of Synthroid  Recheck TSH in setting of her paranoid delusions    * Delusion (CMS/HCC)  Assessment & Plan  Patient was expressing paranoid thoughts which is unusual for her  Normal UA and normal CT scan of the brain  She is in a new location and is hard of hearing and probably visually impaired as well  These could cause confusion  We will start some PT and OT tomorrow  Zyprexa as needed if she becomes delirious although presently she appears to be calm and oriented      VTE Assessment: Padua    VTE Prophylaxis: Current anticoagulants:    None      Code Status: Prior   Patient expressed that she wanted to be full code to the ER provider  Estimated Discharge Date: 10/10/2024  Disposition Planning: Return to assisted living if stable     Sofie Cooper MD  10/9/2024

## 2024-10-09 NOTE — CONSULTS
"    GASTROENTEROLOGY CONSULTATION   [facility]     PATIENT NAME:  Sofie Stewart        YOB: 1931   AGE:  89 y.o.         MRN:  [id number]              HISTORY   CC: anemia    89 y/o F w/PMHx of HTN, HLD who presents from assisted living facility w/ AMS. GI consulted for anemia.   Pt very hard of hearing so hx is somewhat limited. She denies melena or hematochezia but reports stool has been a \"little darker\" recently. Denies chronic NSAID use. Reports she took one dose of aspirin daily for 3d after a fall recently. Says she had a colonoscopy years ago. Denies abdominal pain, n/v. Stool was heme negative in the ER    PAST MEDICAL HISTORY     Past Medical History:   Diagnosis Date    Hypertension     Hypothyroidism     Lipid disorder        PAST SURGICAL HISTORY   No past surgical history on file.     FAMILY HISTORY   No family history on file.    SOCIAL HISTORY     Social History     Tobacco Use    Smoking status: Never    Smokeless tobacco: Never   Substance Use Topics    Alcohol use: Not on file       MEDICATIONS   Home Medication:   acetaminophen (TYLENOL) tablet 650 mg  650 mg oral q4h PRN    ammonium lactate (AMLACTIN) 12 % cream   Topical BID    [Provider Managed Hold] bethanechol (URECHOLINE) tablet 10 mg  10 mg oral TID    glucose chewable tablet 16-32 g of dextrose  16-32 g of dextrose oral PRN    Or    dextrose 40 % oral gel 15-30 g of dextrose  15-30 g of dextrose oral PRN    Or    glucagon (GLUCAGEN) injection 1 mg  1 mg intramuscular PRN    Or    dextrose 50 % in water (D50) injection 12.5 g  25 mL intravenous PRN    enoxaparin (LOVENOX) syringe 40 mg  40 mg subcutaneous Daily (6p)    ferric gluconate (FERRLECIT) 125 mg in sodium chloride 0.9 % 100 mL IVPB  125 mg intravenous Daily    fluticasone propionate (FLONASE) 50 mcg/actuation nasal spray 1 spray  1 spray Each Nostril Daily    levothyroxine (SYNTHROID) tablet 88 mcg  88 mcg oral Daily (6:30a)    magnesium sulfate IVPB 1g in 100 mL " NSS/D5W/SWFI  1 g intravenous Once    pantoprazole (PROTONIX) tablet,delayed release (DR/EC) 20 mg  20 mg oral BID    pravastatin (PRAVACHOL) tablet 20 mg  20 mg oral Nightly    sennosides-docusate sodium (SENOKOT-S) 8.6-50 mg per tablet 2 tablet  2 tablet oral Daily PRN    sodium chloride 0.9 % infusion  5 mL/hr intravenous PRN       MEDICATIONS:  Infusions:         Scheduled:    ammonium lactate   Topical BID    [Provider Managed Hold] bethanechol  10 mg oral TID    enoxaparin  40 mg subcutaneous Daily (6p)    ferric gluconate (FERRLECIT) 125 mg in sodium chloride 0.9 % 100 mL IVPB  125 mg intravenous Daily    fluticasone propionate  1 spray Each Nostril Daily    levothyroxine  88 mcg oral Daily (6:30a)    magnesium sulfate  1 g intravenous Once    pantoprazole  20 mg oral BID    pravastatin  20 mg oral Nightly       ALLERGIES   No Known Allergies    REVIEW OF SYSTEMS   Systems reviewed and otherwise negative except as documented above.    PHYSICAL EXAMINATION   Temp:  [35.8 °C (96.5 °F)-36.7 °C (98 °F)] 36.7 °C (98 °F)  Heart Rate:  [66-97] 78  Resp:  [14-24] 18  BP: (126-189)/(58-78) 147/67      Intake/Output Summary (Last 24 hours) at 10/9/2024 1100  Last data filed at 10/9/2024 0705  Gross per 24 hour   Intake 305 ml   Output 200 ml   Net 105 ml       General appearance: well developed, well nourished, no acute distress, appears stated age  Head: normocephalic, atraumatic  Eyes: EOMI  ENT: oral mucosa moist  Lungs: non-labored respirations  Heart: regular rate   Abdomen: soft, non-tender; bowel sounds normal  Extremities: no edema  Skin:  Warm, dry, no rashes, no lesions, no jaundice  Neurologic: awake, alert & oriented x 3  Psych: cooperative with exam, appropriate mood and affect    Diagnostic Data:     Labs reviewed  Results from last 7 days   Lab Units 10/09/24  0555 10/08/24  1922   WBC K/uL 6.35 6.06   HEMOGLOBIN g/dL 7.4* 6.7*   HEMATOCRIT % 23.2* 21.7*   PLATELETS K/uL 430* 528*   ]  Results from last  7 days   Lab Units 10/09/24  0555 10/08/24  1922   SODIUM mEQ/L 137 136   POTASSIUM mEQ/L 3.5 3.6   CHLORIDE mEQ/L 105 104   CO2 mEQ/L 23 24   BUN mg/dL 13 15   CREATININE mg/dL 0.6 0.6   CALCIUM mg/dL 7.9* 8.5*   ALBUMIN g/dL  --  3.4*   BILIRUBIN TOTAL mg/dL  --  0.6   ALK PHOS IU/L  --  126*   ALT IU/L  --  12   AST IU/L  --  15   GLUCOSE mg/dL 72 109*   ]    ]    ]    Imaging reviewed  X-RAY WRIST LEFT 2 VIEWS    Result Date: 10/9/2024  IMPRESSION: See comment.    CT HEAD WITHOUT IV CONTRAST    Result Date: 10/8/2024  IMPRESSION: 1. There is no acute intracranial abnormality.  No intracranial hemorrhage or other evidence for traumatic injury. 2. Moderate microvascular ischemic white matter changes are present with moderate global cerebral volume loss.       ASSESSMENT/PLAN     89 y/o F w/PMHx of HTN, HLD who presents from assisted living facility w/ AMS. GI consulted for anemia.     Anemia - hgb 6.7 -> 7.4 (was 12.1 in june of 2024). MCV nml. serum iron and iron sat low, TIBC nml. Ferritin pending  Hard of hearing  HTN, HLD    Plan  Spoke w/ pts son Raymond re: work up of pts anemia including CT scan, endoscopic evaluation. He prefers to start w/ CT scan only and make decisions re: endoscopic eval based on results  Trend h/h, transfuse  PRBCs prn  Ferrlecit has been ordered    AUTHOR:  ADRIANA Arshad  10/9/2024

## 2024-10-09 NOTE — ASSESSMENT & PLAN NOTE
Blood pressure is well-controlled continue Norvasc 2.5 mg daily    10/10  According to pharmacist, patient is no longer on any medications for hypertension.  Stopped the Norvasc, as this can lead to significant edema of the lower extremities.

## 2024-10-09 NOTE — ASSESSMENT & PLAN NOTE
- Patient was expressing paranoid thoughts which is unusual for her  - Normal UA and normal CT scan of the brain  - Dc'd bethanechol   - Improving

## 2024-10-09 NOTE — ASSESSMENT & PLAN NOTE
She is acute anemia that developed over the last 4 months. (12 g/dl > 6.7 g/dl)  She has normal white blood cell count with a fairly normal differential.  Platelet count is slightly elevated reactive to iron deficiency anemia  She has low iron, iron saturation.  Ferritin not sent.  Folate and B12 levels are normal  CMP shows normal renal function, normal total protein and fairly normal electrolytes.    Will check reticulocyte count and reticulated hemoglobin.  Addendum: Retic hemoglobin low at 21 consistent with iron deficiency anemia.  Reticulocyte count low or hypoproliferative  GI consulted and CT of the abdomen and pelvis ordered.  No acute pathology found.  ?  Endoscopic evaluation  Starting Ferrlecit 125 mg intravenously daily x 8 infusions  Will also send peripheral blood flow cytometry however suspicion for an underlying lymphoproliferative disorder low with findings of iron deficiency anemia.    10/10/24  She tolerates IV iron very well.  Will continue.  GI is on board for possible scope pending discussion with the family members.  She denies any bleeding at this point.  She is not symptomatic.  Okay to transfuse to keep hemoglobin above 8 if she has chest pain or shortness of breath.  Otherwise we can keep HGB above 7.    10/12/24  She had a EGD on October 11, found to have nonbleeding duodenal ulcers.  Her son is not interested in colonoscopy at this point.  As such we will continue IV iron to give her totally 1000 mg iron.  Depending on the timing of her discharge we will arrange the rest as outpatient.  Transfuse to keep hemoglobin above 7.  Will continue to follow along.    10/14/24 -Labs reviewed and overall findings reflect stable H&H.  -We have not encountered any overt bleeding.  At this point plan is for supportive measures and no further endoscopic evaluation.  -Recommend continued parenteral iron supplementation with close follow-up in outpatient setting upon discharge as patient may need  additional iron supplementation +/- TRACIE therapy for her chronic anemia issues.  -With no evidence or concern for overt bleeding can reduce CBC checks to every other day helping reduce iatrogenic blood loss.

## 2024-10-09 NOTE — ASSESSMENT & PLAN NOTE
- Likley secondary to Norvasc use, dc'd  - LE US this admission neg for DVT  - Continue AmLactin, Ace wrap.

## 2024-10-10 ENCOUNTER — APPOINTMENT (INPATIENT)
Dept: RADIOLOGY | Facility: HOSPITAL | Age: 89
DRG: 383 | End: 2024-10-10
Payer: COMMERCIAL

## 2024-10-10 PROBLEM — R53.81 DEBILITY: Status: ACTIVE | Noted: 2024-10-10

## 2024-10-10 PROBLEM — L97.401: Status: ACTIVE | Noted: 2024-10-10

## 2024-10-10 PROBLEM — G92.9 TOXIC ENCEPHALOPATHY: Status: ACTIVE | Noted: 2024-10-08

## 2024-10-10 LAB
BASOPHILS # BLD: 0.04 K/UL (ref 0.01–0.1)
BASOPHILS NFR BLD: 0.7 %
CROSSMATCH: NORMAL
DIFFERENTIAL METHOD BLD: ABNORMAL
EOSINOPHIL # BLD: 0.16 K/UL (ref 0.04–0.36)
EOSINOPHIL NFR BLD: 2.8 %
ERYTHROCYTE [DISTWIDTH] IN BLOOD BY AUTOMATED COUNT: 15.1 % (ref 11.7–14.4)
HCT VFR BLD AUTO: 23.9 % (ref 35–45)
HGB BLD-MCNC: 7.8 G/DL (ref 11.8–15.7)
IMM GRANULOCYTES # BLD AUTO: 0.03 K/UL (ref 0–0.08)
IMM GRANULOCYTES NFR BLD AUTO: 0.5 %
ISBT CODE: 5100
LYMPHOCYTES # BLD: 1.34 K/UL (ref 1.2–3.5)
LYMPHOCYTES NFR BLD: 23.8 %
MCH RBC QN AUTO: 28.4 PG (ref 28–33.2)
MCHC RBC AUTO-ENTMCNC: 32.6 G/DL (ref 32.2–35.5)
MCV RBC AUTO: 86.9 FL (ref 83–98)
MONOCYTES # BLD: 0.62 K/UL (ref 0.28–0.8)
MONOCYTES NFR BLD: 11 %
NEUTROPHILS # BLD: 3.43 K/UL (ref 1.7–7)
NEUTS SEG NFR BLD: 61.2 %
NRBC BLD-RTO: 0.7 %
PLATELET # BLD AUTO: 383 K/UL (ref 150–369)
PMV BLD AUTO: 9.2 FL (ref 9.4–12.3)
PRODUCT CODE: NORMAL
PRODUCT STATUS: NORMAL
RBC # BLD AUTO: 2.75 M/UL (ref 3.93–5.22)
SPECIMEN EXP DATE BLD: NORMAL
UNIT ABO: NORMAL
UNIT ID: NORMAL
UNIT RH: POSITIVE
WBC # BLD AUTO: 5.62 K/UL (ref 3.8–10.5)

## 2024-10-10 PROCEDURE — 36415 COLL VENOUS BLD VENIPUNCTURE: CPT | Performed by: INTERNAL MEDICINE

## 2024-10-10 PROCEDURE — 12000000 HC ROOM AND CARE MED/SURG

## 2024-10-10 PROCEDURE — 63700000 HC SELF-ADMINISTRABLE DRUG: Performed by: INTERNAL MEDICINE

## 2024-10-10 PROCEDURE — 97166 OT EVAL MOD COMPLEX 45 MIN: CPT | Mod: GO

## 2024-10-10 PROCEDURE — 99233 SBSQ HOSP IP/OBS HIGH 50: CPT | Performed by: INTERNAL MEDICINE

## 2024-10-10 PROCEDURE — 99231 SBSQ HOSP IP/OBS SF/LOW 25: CPT | Performed by: HOSPITALIST

## 2024-10-10 PROCEDURE — 63600000 HC DRUGS/DETAIL CODE: Mod: JZ | Performed by: INTERNAL MEDICINE

## 2024-10-10 PROCEDURE — 25800000 HC PHARMACY IV SOLUTIONS: Performed by: INTERNAL MEDICINE

## 2024-10-10 PROCEDURE — 73630 X-RAY EXAM OF FOOT: CPT | Mod: LT

## 2024-10-10 PROCEDURE — 82668 ASSAY OF ERYTHROPOIETIN: CPT | Performed by: INTERNAL MEDICINE

## 2024-10-10 PROCEDURE — 85025 COMPLETE CBC W/AUTO DIFF WBC: CPT | Performed by: INTERNAL MEDICINE

## 2024-10-10 PROCEDURE — 97162 PT EVAL MOD COMPLEX 30 MIN: CPT | Mod: GP

## 2024-10-10 RX ADMIN — SODIUM CHLORIDE 125 MG: 9 INJECTION, SOLUTION INTRAVENOUS at 08:44

## 2024-10-10 RX ADMIN — ENOXAPARIN SODIUM 40 MG: 40 INJECTION SUBCUTANEOUS at 17:31

## 2024-10-10 RX ADMIN — FLUTICASONE PROPIONATE 1 SPRAY: 50 SPRAY, METERED NASAL at 08:45

## 2024-10-10 RX ADMIN — Medication: at 08:45

## 2024-10-10 RX ADMIN — PANTOPRAZOLE SODIUM 20 MG: 20 TABLET, DELAYED RELEASE ORAL at 08:44

## 2024-10-10 RX ADMIN — LEVOTHYROXINE SODIUM 88 MCG: 0.09 TABLET ORAL at 06:00

## 2024-10-10 ASSESSMENT — COGNITIVE AND FUNCTIONAL STATUS - GENERAL
CLIMB 3 TO 5 STEPS WITH RAILING: 1 - TOTAL
CLIMB 3 TO 5 STEPS WITH RAILING: 1 - TOTAL
TOILETING: 1 - TOTAL
DRESSING REGULAR UPPER BODY CLOTHING: 2 - A LOT
STANDING UP FROM CHAIR USING ARMS: 2 - A LOT
WALKING IN HOSPITAL ROOM: 1 - TOTAL
AFFECT: WFL;CONFUSED
HELP NEEDED FOR BATHING: 2 - A LOT
CLIMB 3 TO 5 STEPS WITH RAILING: 1 - TOTAL
EATING MEALS: 3 - A LITTLE
STANDING UP FROM CHAIR USING ARMS: 2 - A LOT
MOVING TO AND FROM BED TO CHAIR: 1 - TOTAL
MOVING TO AND FROM BED TO CHAIR: 2 - A LOT
MOVING TO AND FROM BED TO CHAIR: 2 - A LOT
HELP NEEDED FOR PERSONAL GROOMING: 2 - A LOT
DRESSING REGULAR LOWER BODY CLOTHING: 1 - TOTAL
AFFECT: WFL;CONFUSED
WALKING IN HOSPITAL ROOM: 1 - TOTAL
STANDING UP FROM CHAIR USING ARMS: 1 - TOTAL
WALKING IN HOSPITAL ROOM: 1 - TOTAL

## 2024-10-10 NOTE — PLAN OF CARE
Problem: Adult Inpatient Plan of Care  Goal: Plan of Care Review  Outcome: Progressing  Flowsheets (Taken 10/10/2024 1257)  Progress: no change  Outcome Evaluation: OT eval complete.  Plan of Care Reviewed With: patient     Problem: Self-Care Deficit  Goal: Improved Ability to Complete Activities of Daily Living  Outcome: Progressing

## 2024-10-10 NOTE — PROGRESS NOTES
Hematology/Oncology -  Daily Progress Note       SUBJECTIVE   Interval History: She was seen lying in bed comfortably.  She denies denies any bleeding.  She has no chest pain, shortness of breath. She has hard time hearing.     OBJECTIVE      Vital signs in last 24 hours:  Temp:  [36.4 °C (97.5 °F)-36.5 °C (97.7 °F)] 36.4 °C (97.5 °F)  Heart Rate:  [69-78] 76  Resp:  [13-16] 16  BP: (136-166)/(63-74) 166/74    Intake/Output Summary (Last 24 hours) at 10/10/2024 1440  Last data filed at 10/10/2024 0944  Gross per 24 hour   Intake 430 ml   Output --   Net 430 ml       PHYSICAL EXAMINATION          Physical Examination:  Physical Exam  Constitutional:       Appearance: She is not ill-appearing.   Cardiovascular:      Rate and Rhythm: Normal rate and regular rhythm.   Pulmonary:      Effort: Pulmonary effort is normal.      Breath sounds: Normal breath sounds.   Abdominal:      Palpations: Abdomen is soft.   Skin:     Coloration: Skin is not jaundiced.   Neurological:      General: No focal deficit present.      Mental Status: She is alert.   Psychiatric:         Mood and Affect: Mood normal.         No data recorded   ammonium lactate   Topical BID    enoxaparin  40 mg subcutaneous Daily (6p)    ferric gluconate (FERRLECIT) 125 mg in sodium chloride 0.9 % 100 mL IVPB  125 mg intravenous Daily    fluticasone propionate  1 spray Each Nostril Daily    levothyroxine  88 mcg oral Daily (6:30a)    pantoprazole  20 mg oral BID    pravastatin  20 mg oral Nightly        LABS / IMAGING / TELE      Labs  Recent Results (from the past 24 hours)   CBC and Differential    Collection Time: 10/10/24  3:17 AM   Result Value Ref Range    WBC 5.62 3.80 - 10.50 K/uL    RBC 2.75 (L) 3.93 - 5.22 M/uL    Hemoglobin 7.8 (L) 11.8 - 15.7 g/dL    Hematocrit 23.9 (L) 35.0 - 45.0 %    MCV 86.9 83.0 - 98.0 fL    MCH 28.4 28.0 - 33.2 pg    MCHC 32.6 32.2 - 35.5 g/dL    RDW 15.1 (H) 11.7 - 14.4 %    Platelets 383 (H) 150 - 369 K/uL    MPV 9.2 (L)  9.4 - 12.3 fL    Differential Type Auto     nRBC 0.7 (H) <=0.0 %    Immature Granulocytes 0.5 %    Neutrophils 61.2 %    Lymphocytes 23.8 %    Monocytes 11.0 %    Eosinophils 2.8 %    Basophils 0.7 %    Immature Granulocytes, Absolute 0.03 0.00 - 0.08 K/uL    Neutrophils, Absolute 3.43 1.70 - 7.00 K/uL    Lymphocytes, Absolute 1.34 1.20 - 3.50 K/uL    Monocytes, Absolute 0.62 0.28 - 0.80 K/uL    Eosinophils, Absolute 0.16 0.04 - 0.36 K/uL    Basophils, Absolute 0.04 0.01 - 0.10 K/uL   ]    Imaging  CT ABDOMEN PELVIS WITH IV CONTRAST    Result Date: 10/9/2024  EXAM: CT ABDOMEN PELVIS W IV CONTRAST CLINICAL HISTORY: Anemia COMPARISON: 2/29/24 TECHNIQUE: CT abdomen and pelvis with contrast was performed with the patient in the supine position. Images reconstructed/reformatted in the axial, coronal and sagittal planes. Oral contrast was not administered. CT DOSE:  One or more dose reduction techniques (e.g. automated exposure control, adjustment of the mA and/or kV according to patient size, use of iterative reconstruction technique) utilized for this examination. . ADMINISTERED CONTRAST AND DOSE: 100mL of iopamidoL (ISOVUE-370) 370 mg iodine /mL (76 %) injection 100 mL COMMENT: LOWER THORAX: There are small bilateral pleural effusions with associated atelectasis. There is a cyst in the lower right breast on image 23 measuring 2.8 cm. LIVER: Normal in size and configuration. No suspicious mass. BILIARY TREE: No intrahepatic or extrahepatic bile duct dilation. GALLBLADDER: Unremarkable PANCREAS: Unremarkable. No main pancreatic duct dilation. SPLEEN: Unremarkable. ADRENAL GLANDS: Unremarkable. KIDNEYS, URETERS: No hydronephrosis. No suspicious renal mass. BOWEL: No disproportionate dilation of the small or large bowel. There is moderate hiatal hernia. PERITONEUM/RETROPERITONEUM:  No fluid collection, ascites, or pneumoperitoneum. LYMPH NODES: No abdominal or pelvic lymphadenopathy. REPRODUCTIVE ORGANS: There is a cyst in  the right adnexa on image 123, stable. BLADDER: Unremarkable VESSELS: There are moderate atherosclerotic changes in the aorta. BONES: No suspicious osseous lesion.  There are degenerative changes in the spine. SOFT TISSUES: There is mild anasarca.     IMPRESSION: 1. No acute inflammatory process in the abdomen or pelvis. 2. Small bilateral pleural effusions with associated atelectasis 3. Moderate sized hiatal hernia     Ultrasound venous leg bilateral    Result Date: 10/9/2024  CLINICAL HISTORY: R60.9: Edema, unspecified. COMMENT: Comparison: None. Technique: Grayscale ultrasound and color and spectral Doppler of bilateral lower extremities was performed. Findings: RIGHT: There is normal compressibility and color Doppler of the common femoral, femoral, popliteal, and portions of the calf veins. There is normal spontaneous and phasic variation throughout the lower extremity by spectral Doppler. There is subcutaneous edema in the calf. LEFT: There is normal compressibility and color Doppler of the common femoral, femoral, popliteal, and portions of the calf veins. There is normal spontaneous and phasic variation throughout the lower extremity by spectral Doppler. There is subcutaneous edema in the calf.     IMPRESSION: No deep venous thrombosis in the lower extremities. Subcutaneous edema in the calves.    X-RAY WRIST LEFT 2 VIEWS    Result Date: 10/9/2024  CLINICAL HISTORY: Patient fell, swelling   . Comparison: None. COMMENT: 2 views of the left wrist were obtained. Evaluation is limited without additional imaging in oblique planes.   Bony demineralization further limits evaluation. Mild degenerative changes noted at the first carpometacarpal joint with joint space narrowing and small osteophytes. No definite fracture is identified.  There is no subluxation or dislocation. There are no radiopaque foreign bodies identified. MRI can be obtained for further evaluation if clinically indicated.     IMPRESSION: See  comment.       ASSESSMENT AND PLAN      Iron deficiency anemia  Assessment & Plan  She is acute anemia that developed over the last 4 months. (12 g/dl > 6.7 g/dl)  She has normal white blood cell count with a fairly normal differential.  Platelet count is slightly elevated reactive to iron deficiency anemia  She has low iron, iron saturation.  Ferritin not sent.  Folate and B12 levels are normal  CMP shows normal renal function, normal total protein and fairly normal electrolytes.    Will check reticulocyte count and reticulated hemoglobin.  Addendum: Retic hemoglobin low at 21 consistent with iron deficiency anemia.  Reticulocyte count low or hypoproliferative  GI consulted and CT of the abdomen and pelvis ordered.  No acute pathology found.  ?  Endoscopic evaluation  Starting Ferrlecit 125 mg intravenously daily x 8 infusions  Will also send peripheral blood flow cytometry however suspicion for an underlying lymphoproliferative disorder low with findings of iron deficiency anemia.    10/10/24  She tolerates IV iron very well.  Will continue.  GI is on board for possible scope pending discussion with the family members.  She denies any bleeding at this point.  She is not symptomatic.  Okay to transfuse to keep hemoglobin above 8 if she has chest pain or shortness of breath.  Otherwise we can keep HGB above 7.         VTE Assessment: Padua    Code Status: Full Code  Estimated discharge date: 10/11/2024     Hieu Galarza MD, PhD  10/10/2024  2:40 PM

## 2024-10-10 NOTE — PROGRESS NOTES
Occupational Therapy -  Initial Evaluation     Patient: Sofie Stewart  Location: [address]  MRN: [id number]  Today's date: 10/10/2024    HISTORY OF PRESENT ILLNESS     Sofie is a 89 y.o. female admitted on 10/8/2024 with Delusion (CMS/HCC) [F22]  Hallucination [R44.3]  Anemia, unspecified type [D64.9]  Edema, unspecified type [R60.9]. Principal problem is Toxic encephalopathy.    Past Medical History  Sofie has a past medical history of Hypertension, Hypothyroidism, and Lipid disorder.    History of Present Illness   89 y.o. female with a past medical history of hypertension and hypercholesterolemia who recently was transition to assisted living from independent living.  She began having delusions and hallucinations at the assisted living.  This is unlike her as she does not have a history of dementia or mental health issues.  She was expressing thoughts that she thought her son was out to get her money and planning to kill her.  She also states that she was hearing voices and having visual hallucinations where she was seeing red spots.  When I saw her she was trying to sleep.  She is extraordinarily hard of hearing.  She appears to be lucid for the ER staff and for the nursing staff.  The family had expressed to the ER that this is unusual behavior for her and only started after she moved to assisted living the last couple weeks.  The patient had a negative evaluation in the emergency room but was severely anemic with a hemoglobin of 6.7.  The patient states that she may have been having dark's stools but she is not sure.  She was heme-negative in the emergency room.   PRIOR LEVEL OF FUNCTION AND LIVING ENVIRONMENT     Prior Level of Function      Flowsheet Row Most Recent Value   Dominant Hand right   Ambulation completely dependent   Transferring assistive person   Toileting assistive person   Bathing assistive person   Dressing assistive person   Eating independent   IADLs assistive  person   Prior Level of Function Comment Pt reports assist from [facility] staff with w/c transfers, per EMR 1-person assist. Pt reports use of w/c as primary mode of mobility. Reports assist with all ADLs from staff.   Assistive Device Currently Used at Home wheelchair             Prior Living Environment      Flowsheet Row Most Recent Value   People in Home facility resident   Current Living Arrangements assisted living facility   Home Accessibility wheelchair accessible   Living Environment Comment [facility]          Occupational Profile      Flowsheet Row Most Recent Value   Occupational History/Life Experiences recently transitioned from ILF to personal care unit   Performance Patterns assistance with ADLs   Environmental Supports and Barriers facility support          VITALS AND PAIN     OT Vitals      Date/Time Pulse HR Source SpO2 Pt Activity O2 Therapy BP BP Location BP Method Pt Position Who   10/10/24 1115 78 Monitor 95 % At rest None (Room air) 141/64 Left upper arm Automatic Lying MPN   10/10/24 1120 76 Monitor 94 % At rest None (Room air) 166/74 Left upper arm Automatic Sitting MPN          OT Pain      Date/Time Pain Type Side/Orientation Location Rating: Rest Rating: Activity Interventions [facility]   10/10/24 1115 Pain Assessment leg bilateral 2 - mild pain 4 - moderate pain pillow support provided;position adjusted MPN             Objective   OBJECTIVE     Start time:  1107  End time:  1122  Session Length: 15 min  Mode of Treatment: co-treatment  Reason for co-treatment: d/c planning, OT assessed ADLs    General Observations  Patient received upright, in bed. She was agreeable to therapy, no issues or concerns identified by nurse prior to session.      Precautions: fall       Limitations/Impairments: hearing, safety/cognitive      OT Eval and Treat - 10/10/24 1107          Cognition    Orientation Status oriented to;person;verbal cues/prompts needed for orientation;place     Affect/Mental  Status WFL;confused     Follows Commands follows one-step commands;75-90% accuracy;delayed response/completion;increased processing time needed;repetition of directions required     Cognitive Function executive function deficit;safety deficit     Executive Function Deficit information processing;insight/awareness of deficits;self-monitoring/self-correction;planning/decision-making;problem-solving/reasoning     Safety Deficit insight into deficits/self-awareness;judgment;safety precautions awareness     Comment, Cognition pleasant and cooperative, receptive to cues and education, requires repetition of direction due to hearing deficit        Vision Assessment/Intervention    Vision Assessment WFL;corrective lenses for reading        Hearing Assessment    Hearing Status hearing aid, bilateral        Sensory Assessment    Sensory Assessment sensation intact, upper extremities        Upper Extremity Assessment    General Observations assessed functionally, grossly 3+/5        Bed Mobility    Bed Mobility Activities supine to sit;sit to supine;left     [place] moderate assist (50-74% patient effort);1 person assist;2 person assist     Safety/Cues increased time to complete;minimal;verbal cues;tactile cues;hand placement;sequencing;technique     Assistive Device head of bed elevated;bed rails;draw sheet     Comment Mod A x1 supine to sit, assistance with trunk control and scooting toward EOB. Mod A x2 sit to supine, assistance with trunk and LEs.        Mobility Belt    Mobility Belt Used During Session no - patient refused        Sit/Stand Transfer    Surface edge of bed     [place] maximum assist (25-49% patient effort);2 person assist     Safety/Cues increased time to complete;moderate;verbal cues;tactile cues;hand placement;sequencing;technique;maintaining center of gravity over base of support     Assistive Device other (see comments)     Transfer Comments bilateral hand hold assistance. pt with forward  flexed trunk in stance, unable to fully achieve upright standing posture.        Lower Body Dressing    Tasks don;socks     [place] dependent (less than 25% patient effort)     Position supine     Adaptive Equipment none        Toileting    Tasks perform bladder hygiene     [place] dependent (less than 25% patient effort)     Adaptive Equipment catheter, external     Comment +purewick        Balance    Static Sitting Balance mild impairment;sitting, edge of bed     Dynamic Sitting Balance moderate impairment;sitting, edge of bed     Sit to Stand Dynamic Balance severe impairment;supported     Static Standing Balance severe impairment;supported     Dynamic Standing Balance unable to perform activity     Comment, Balance with bilateral hand hold assistance. patient demonstrates left lateral lean at EOB        Impairments/Safety Issues    Impairments Affecting Function balance;cognition;endurance/activity tolerance;range of motion (ROM);strength;pain     Cognitive Impairments, Safety/Performance insight into deficits/self-awareness;safety precaution awareness;sequencing abilities;judgment;problem-solving/reasoning     Functional Endurance Fair-, fatigues quickly                                    Education Documentation  Self-Care, taught by Dalia Clark OT at 10/10/2024 12:58 PM.  Learner: Patient  Readiness: Acceptance  Method: Explanation  Response: Verbalizes Understanding  Comment: Role of OT, POC, safety, fall risk prevention, activity pacing, BADLs, use of call bell, discharge recommendations        Session Outcome  Patient reclined, in bed at end of session, bed alarm on, all needs met, call light in reach, personal items in reach. Nursing notified about patient's performance, patient's position, and patient's response to therapy/activity.    AM-PAC™ - ADL (Current Function)     Putting on/taking off regular lower body clothing 1 - Total   Bathing 2 - A Lot   Toileting 1 - Total   Putting  on/taking off regular upper body clothing 2 - A Lot   Help for taking care of personal grooming 2 - A Lot   Eating meals 3 - A Little   AM-PAC™ ADL Score 11      ASSESSMENT AND PLAN     Progress Summary   OT evaluation complete, ADL AM-PAC score of 11. Patient demonstrates Mod A x1-2 supine to sit, Max A x2 sit<>stand, dependent LB dressing and toilet hygiene. Patient will continue to benefit from skilled OT services to maximize independence with self care and functional mobility. Recommend SNF once medically stable.      Patient/Family Therapy Goal Statement: return to PLOF    OT Plan      Flowsheet Row Most Recent Value   Rehab Potential fair, will monitor progress closely at 10/10/2024 1107   Therapy Frequency 3 times/wk at 10/10/2024 1107   Planned Therapy Interventions activity tolerance training, adaptive equipment training, BADL retraining, functional balance retraining, ROM/therapeutic exercise, transfer/mobility retraining, patient/caregiver education/training, occupation/activity based interventions at 10/10/2024 1107            OT Discharge Recommendations      Flowsheet Row Most Recent Value   OT Recommended Discharge Disposition skilled nursing facility at 10/10/2024 1107   Anticipated Equipment Needs if Discharged Home (OT) none at 10/10/2024 1107                 OT Goals      Flowsheet Row Most Recent Value   Bed Mobility Goal 1    Activity/Assistive Device bed mobility activities, all at 10/10/2024 1107   [place] minimum assist (75% or more patient effort) at 10/10/2024 1107   Time Frame by discharge at 10/10/2024 1107   Progress/Outcome goal ongoing at 10/10/2024 1107   Transfer Goal 1    Activity/Assistive Device all transfers at 10/10/2024 1107   [place] minimum assist (75% or more patient effort) at 10/10/2024 1107   Time Frame by discharge at 10/10/2024 1107   Progress/Outcome goal ongoing at 10/10/2024 1107   Dressing Goal 1    Activity/Adaptive Equipment upper body dressing at  10/10/2024 1107   [place] moderate assist (50-74% patient effort) at 10/10/2024 1107   Time Frame by discharge at 10/10/2024 1107   Progress/Outcome goal ongoing at 10/10/2024 1107   Grooming Goal 1    Activity/Assistive Device grooming skills, all at 10/10/2024 1107   [place] minimum assist (75% or more patient effort) at 10/10/2024 1107   Time Frame by discharge at 10/10/2024 1107   Progress/Outcome goal ongoing at 10/10/2024 1107

## 2024-10-10 NOTE — CONSULTS
"Wound Ostomy Continence Note    Subjective    HPI Patient is a 89 y.o. female who was admitted on 10/8/2024 with a diagnosis of Delusion (CMS/HCC) [F22]  Hallucination [R44.3]  Anemia, unspecified type [D64.9]  Edema, unspecified type [R60.9].    Problem list Principal Problem:    Toxic encephalopathy  Active Problems:    Hypothyroid    HTN (hypertension)    Iron deficiency anemia    Edema    Pain and swelling of left wrist    Skin ulcer of heel, limited to breakdown of skin (CMS/HCC)    Debility     PMH/PSH Past Medical History:   Diagnosis Date    Hypertension     Hypothyroidism     Lipid disorder      No past surgical history on file.   Assessment and Recommendation     Consult by nursing for B/L LE wound care recs. Patient has pending Podiatry team consult, will defer to their expertise for mgmt. as to not duplicate care. Campos score at time of last bedside nursing assessment-14. Recs noted below. Wound care to sign off, please re consult if needed.    Maintain PI PREVENT bundle, where applicable    LE wounds: Per Podiatry team recs        Wound Prevention Bundle                                                             Positioning- If clinically able:  Reposition at a minimum of 2 hours.  Adjust to avoid lying on medical devices. Use positioning devices to offload bony prominences. When out of bed, use chair cushions. Monitor time OOB and encourage repositioning. Use transfer aids to reduce friction and shear. Use \"turn assist.\"                   Risk Assessment:              Utilize risk assessment scale per hospital guidelines.  Identify additional risk factors, for example, medications and comorbidities. Match interventions to subscales of hospital's Risk assessment scale.  communicate risk to interdisciplinary healthcare team.                   Evaluate Surface/Pressure Redistribution:              Consider specialty sleep surface according to patient need.   Utilize \"less is best\" with linen usage.      "              Vigilant Skin Inspection:              Inspect skin from head to toe, including areas under removable medical devices and dressings. Communicate skin issues to healthcare team and consult resources as needed.                              Evaluate incontinence/moisture/temperature:              Offer toileting when appropriate. Keep skin clean and dry (microclimate). Use moisture barrier products for incontinence care. Diapers/briefs for out of bed or off unit. Use absorbent under pads that wick away and hold moisture. Intervene for fecal and/or urinary incontinence (consider external containment devices. Use skin emollients (dry skin).                             Nutrition:              Consult dietician for at risk patients. Assist with menu preferences and feeding. Encourage snacks, fluid and supplements with rounding. Accurately record all intake where indicated.                       Teaching Patients and Families:              Encourage patients and families to partner with staff in preventing pressure injuries. Give patients and families pressure injury education, assess their understanding of education given, and document education.       Date: 10/10/24  Signature: Yolanda Simmons RN

## 2024-10-10 NOTE — PROGRESS NOTES
Physical Therapy -  Initial Evaluation     Patient: Sofie Stewart  Location: [address]  MRN: [id number]  Today's date: 10/10/2024    HISTORY OF PRESENT ILLNESS     Sofie is a 89 y.o. female admitted on 10/8/2024 with Delusion (CMS/HCC) [F22]  Hallucination [R44.3]  Anemia, unspecified type [D64.9]  Edema, unspecified type [R60.9]. Principal problem is Toxic encephalopathy.    Past Medical History  Sofie has a past medical history of Hypertension, Hypothyroidism, and Lipid disorder.    History of Present Illness   89 y.o. female with a past medical history of hypertension and hypercholesterolemia who recently was transition to assisted living from independent living.  She began having delusions and hallucinations at the assisted living.  This is unlike her as she does not have a history of dementia or mental health issues.  She was expressing thoughts that she thought her son was out to get her money and planning to kill her.  She also states that she was hearing voices and having visual hallucinations where she was seeing red spots.  When I saw her she was trying to sleep.  She is extraordinarily hard of hearing.  She appears to be lucid for the ER staff and for the nursing staff.  The family had expressed to the ER that this is unusual behavior for her and only started after she moved to assisted living the last couple weeks.  The patient had a negative evaluation in the emergency room but was severely anemic with a hemoglobin of 6.7.  The patient states that she may have been having dark's stools but she is not sure.  She was heme-negative in the emergency room.   PRIOR LEVEL OF FUNCTION AND LIVING ENVIRONMENT     Prior Level of Function      Flowsheet Row Most Recent Value   Dominant Hand right   Ambulation completely dependent   Transferring assistive person   Toileting assistive person   Bathing assistive person   Dressing assistive person   Eating independent   IADLs assistive person    Prior Level of Function Comment Pt reports assist from [facility] staff with w/c transfers, per EMR 1-person assist. Pt reports use of w/c as primary mode of mobility. Reports assist with all ADLs from staff.   Assistive Device Currently Used at Home wheelchair        History of Falls: One fall in the past year    Prior Living Environment      Flowsheet Row Most Recent Value   People in Home facility resident   Current Living Arrangements assisted living facility   Home Accessibility wheelchair accessible   Living Environment Comment [facility]          VITALS AND PAIN     PT Vitals      Date/Time Pulse HR Source SpO2 Pt Activity O2 Therapy BP BP Location BP Method Pt Position [facility]   10/10/24 1115 78 Monitor 95 % At rest None (Room air) 141/64 Left upper arm Automatic Lying MPN   10/10/24 1120 76 Monitor 94 % At rest None (Room air) 166/74 Left upper arm Automatic Sitting MPN          PT Pain      Date/Time Pain Type Side/Orientation Location Rating: Rest Rating: Activity Interventions [facility]   10/10/24 1115 Pain Assessment bilateral leg 2 - mild pain 4 - moderate pain pillow support provided;position adjusted MPN             Objective   OBJECTIVE     Start time:  1109  End time:  1124  Session Length: 15 min  Mode of Treatment: co-treatment  Reason for co-treatment: d/c planning, PT focus on balance and mobility    General Observations  Patient received upright, in bed. She was agreeable to therapy, no issues or concerns identified by nurse prior to session.      Precautions: fall       Limitations/Impairments: hearing, safety/cognitive      PT Eval and Treat - 10/10/24 1109          Cognition    Orientation Status oriented to;person;verbal cues/prompts needed for orientation;place   Place= Hospital    Affect/Mental Status WFL;confused     Follows Commands follows one-step commands;75-90% accuracy;delayed response/completion;increased processing time needed;repetition of directions required     Cognitive  Function executive function deficit;safety deficit     Executive Function Deficit information processing;insight/awareness of deficits;self-monitoring/self-correction;planning/decision-making;problem-solving/reasoning     Safety Deficit insight into deficits/self-awareness;judgment;safety precautions awareness        Vision Assessment/Intervention    Vision Assessment WFL;corrective lenses for reading        Hearing Assessment    Hearing Status hearing impairment, bilaterally;hearing aid, bilateral        Sensory Assessment    Sensory Assessment sensation intact, lower extremities        Lower Extremity Strength    Hip, Left (Strength) 3+/5 hip flexion     Knee, Left (Strength) 4-/5 knee extension     Ankle, Left (Strength) 4/5 ankle DF     Hip, Right (Strength) 3+/5 hip flexion     Knee, Right (Strength) 4-/5 knee extension     Ankle, Right (Strength) 4/5 ankle DF        Bed Mobility    Bed Mobility Activities left;supine to sit;sit to supine     [place] moderate assist (50-74% patient effort);1 person assist;2 person assist     Safety/Cues increased time to complete;verbal cues;tactile cues;hand placement;sequencing;technique     Assistive Device bed rails;draw sheet;head of bed elevated     Comment Mod A x1 out of bed to assist with trunk + use of draw sheet to scoot toward edge of bed. Mod A x2 return to bed to assist with trunk and BLE.        Mobility Belt    Mobility Belt Used During Session no - patient refused        Sit/Stand Transfer    Surface edge of bed     [place] maximum assist (25-49% patient effort);2 person assist     Safety/Cues increased time to complete;verbal cues;tactile cues;hand placement;sequencing;maintaining center of gravity over base of support     Assistive Device other (see comments)   B/L HHA    Transfer Comments Required assist with lift off due to LE pain/weakness, along with steadying in stance due to balance impairments. Pt with forward flexed trunk in stance, unable  to fully extend hips to upright standing posture.        Surface-to-Surface Transfers    [place] not tested     Transfer Comments Deferred for safety, pt required max A x2 with sit <> stand, unable to achieve fully upright standing posture, limited standing tolerance.        Balance    Static Sitting Balance mild impairment;sitting, edge of bed     Dynamic Sitting Balance moderate impairment;sitting, edge of bed     Sit to Stand Dynamic Balance severe impairment;supported     Static Standing Balance severe impairment;supported     Dynamic Standing Balance unable to perform activity     Balance Interventions occupation based/functional task     Comment, Balance B/L HHA in stance        Impairments/Safety Issues    Impairments Affecting Function balance;cognition;endurance/activity tolerance;range of motion (ROM);strength;pain     Cognitive Impairments, Safety/Performance insight into deficits/self-awareness;safety precaution awareness;sequencing abilities;judgment;problem-solving/reasoning     Functional Endurance Fair (-): quick to fatigue                                    Education Documentation  Joint Mobility/Strength, taught by Herminio Davila PT at 10/10/2024 12:54 PM.  Learner: Patient  Readiness: Acceptance  Method: Explanation  Response: Needs Reinforcement  Comment: Role of PT, PT POC and d/c recs, use of call bell and assist with mobility    Fall Prevention, taught by Herminio Davila PT at 10/10/2024 12:54 PM.  Learner: Patient  Readiness: Acceptance  Method: Explanation  Response: Needs Reinforcement  Comment: Role of PT, PT POC and d/c recs, use of call bell and assist with mobility    Call Light Use, taught by Herminio Davila PT at 10/10/2024 12:54 PM.  Learner: Patient  Readiness: Acceptance  Method: Explanation  Response: Needs Reinforcement  Comment: Role of PT, PT POC and d/c recs, use of call bell and assist with mobility    Treatment Plan, taught by Herminio Davila PT at 10/10/2024 12:54  PM.  Learner: Patient  Readiness: Acceptance  Method: Explanation  Response: Needs Reinforcement  Comment: Role of PT, PT POC and d/c recs, use of call bell and assist with mobility        Session Outcome  Patient reclined, in bed at end of session, bed alarm on, all needs met, call light in reach, personal items in reach. Nursing notified about patient's performance, patient's position, and patient's response to therapy/activity.    AM-PAC™ - Mobility (Current Function)     Turning form your back to your side while in flat bed without using bedrails 2 - A Lot   Moving from lying on your back to sitting on the side of a flat bed without using bedrails 2 - A Lot   Moving to and from a bed to a chair 2 - A Lot   Standing up from a chair using your arms 2 - A Lot   To walk in a hospital room 1 - Total   Climbing 3-5 steps with a railing 1 - Total   AM-PAC™ Mobility Score 10      ASSESSMENT AND PLAN     Progress Summary  PT evaluation complete. [facility] 10/24. Pt presents with BLE pain/weakness, impaired seated/standing balance, decreased endurance/activity tolerance, and decreased safety awareness. She required mod A x1-2 with bed mobility, max A x2 with sit <> stand. Limited activity tolerance due to pain + generalized weakness/fatigue. She will benefit from skilled PT to maximize safety and independence with functional mobility. Recommending SNF when stable for d/c.    Patient/Family Therapy Goals Statement: To get better    PT Plan      Flowsheet Row Most Recent Value   Rehab Potential fair, will monitor progress closely at 10/10/2024 1109   Therapy Frequency 3 times/wk at 10/10/2024 1109   Planned Therapy Interventions balance training, bed mobility training, strengthening, patient/family education, transfer training, ROM (range of motion), wheelchair management/propulsion training at 10/10/2024 1109            PT Discharge Recommendations      Flowsheet Row Most Recent Value   PT Recommended Discharge Disposition  skilled nursing facility at 10/10/2024 1109   Anticipated Equipment Needs if Discharged Home (PT) none at 10/10/2024 1109                 PT Goals      Flowsheet Row Most Recent Value   Bed Mobility Goal 1    Activity/Assistive Device sit to supine/supine to sit at 10/10/2024 1109   [place] minimum assist (75% or more patient effort) at 10/10/2024 1109   Time Frame by discharge at 10/10/2024 1109   Progress/Outcome goal ongoing at 10/10/2024 1109   Transfer Goal 1    Activity/Assistive Device sit-to-stand/stand-to-sit, bed-to-chair/chair-to-bed, other (see comments)  [LRAD versus no AD] at 10/10/2024 1109   [place] minimum assist (75% or more patient effort) at 10/10/2024 1109   Time Frame by discharge at 10/10/2024 1109   Progress/Outcome goal ongoing at 10/10/2024 1109

## 2024-10-10 NOTE — PROGRESS NOTES
"  GASTROENTEROLOGY DAILY PROGRESS NOTE  [facility]     PATIENT NAME:  Sofie Stewart          YOB: 1931  AGE:  89 y.o.   MRN: [id number]      SUBJECTIVE   Cc f/u anemia  Denies abd pain     REVIEW OF SYSTEMS   13 point ROS unchanged    VITAL SIGNS   Height: 1.549 m (5' 1\") Weight:   Body mass index is 31.74 kg/m².  Vitals over the last 24 hours:   Temp:  [36.2 °C (97.2 °F)-36.7 °C (98 °F)] 36.4 °C (97.5 °F)  Heart Rate:  [69-78] 69  Resp:  [13-18] 16  BP: (136-150)/(63-68) 150/68  PHYSICAL EXAM   Physical Exam  General appearance: alert, appears stated age and cooperative  Head: normocephalic  Lungs: clear to auscultation anteriorly   Heart: regular rate   Abdomen: soft, non-tender; bowel sounds normal; no masses, no organomegaly  Rectal:   Extremities: no edema  Skin: No jaundice  Neurologic: awake and alert      IMAGING AND LABS REVIEWED     Lab Results   Component Value Date    WBC 5.62 10/10/2024    HGB 7.8 (L) 10/10/2024    HCT 23.9 (L) 10/10/2024     (H) 10/10/2024    ALT 12 10/08/2024    AST 15 10/08/2024     10/09/2024    K 3.5 10/09/2024     10/09/2024    CREATININE 0.6 10/09/2024    BUN 13 10/09/2024    CO2 23 10/09/2024    TSH 1.56 10/08/2024    INR 0.9 02/29/2024     CT ABDOMEN PELVIS WITH IV CONTRAST    Result Date: 10/9/2024  EXAM: CT ABDOMEN PELVIS W IV CONTRAST CLINICAL HISTORY: Anemia COMPARISON: 2/29/24 TECHNIQUE: CT abdomen and pelvis with contrast was performed with the patient in the supine position. Images reconstructed/reformatted in the axial, coronal and sagittal planes. Oral contrast was not administered. CT DOSE:  One or more dose reduction techniques (e.g. automated exposure control, adjustment of the mA and/or kV according to patient size, use of iterative reconstruction technique) utilized for this examination. . ADMINISTERED CONTRAST AND DOSE: 100mL of iopamidoL (ISOVUE-370) 370 mg iodine /mL (76 %) injection 100 mL COMMENT: LOWER THORAX: There are " small bilateral pleural effusions with associated atelectasis. There is a cyst in the lower right breast on image 23 measuring 2.8 cm. LIVER: Normal in size and configuration. No suspicious mass. BILIARY TREE: No intrahepatic or extrahepatic bile duct dilation. GALLBLADDER: Unremarkable PANCREAS: Unremarkable. No main pancreatic duct dilation. SPLEEN: Unremarkable. ADRENAL GLANDS: Unremarkable. KIDNEYS, URETERS: No hydronephrosis. No suspicious renal mass. BOWEL: No disproportionate dilation of the small or large bowel. There is moderate hiatal hernia. PERITONEUM/RETROPERITONEUM:  No fluid collection, ascites, or pneumoperitoneum. LYMPH NODES: No abdominal or pelvic lymphadenopathy. REPRODUCTIVE ORGANS: There is a cyst in the right adnexa on image 123, stable. BLADDER: Unremarkable VESSELS: There are moderate atherosclerotic changes in the aorta. BONES: No suspicious osseous lesion.  There are degenerative changes in the spine. SOFT TISSUES: There is mild anasarca.     IMPRESSION: 1. No acute inflammatory process in the abdomen or pelvis. 2. Small bilateral pleural effusions with associated atelectasis 3. Moderate sized hiatal hernia     Ultrasound venous leg bilateral    Result Date: 10/9/2024  CLINICAL HISTORY: R60.9: Edema, unspecified. COMMENT: Comparison: None. Technique: Grayscale ultrasound and color and spectral Doppler of bilateral lower extremities was performed. Findings: RIGHT: There is normal compressibility and color Doppler of the common femoral, femoral, popliteal, and portions of the calf veins. There is normal spontaneous and phasic variation throughout the lower extremity by spectral Doppler. There is subcutaneous edema in the calf. LEFT: There is normal compressibility and color Doppler of the common femoral, femoral, popliteal, and portions of the calf veins. There is normal spontaneous and phasic variation throughout the lower extremity by spectral Doppler. There is subcutaneous edema in the  calf.     IMPRESSION: No deep venous thrombosis in the lower extremities. Subcutaneous edema in the calves.    X-RAY WRIST LEFT 2 VIEWS    Result Date: 10/9/2024  CLINICAL HISTORY: Patient fell, swelling   . Comparison: None. COMMENT: 2 views of the left wrist were obtained. Evaluation is limited without additional imaging in oblique planes.   Bony demineralization further limits evaluation. Mild degenerative changes noted at the first carpometacarpal joint with joint space narrowing and small osteophytes. No definite fracture is identified.  There is no subluxation or dislocation. There are no radiopaque foreign bodies identified. MRI can be obtained for further evaluation if clinically indicated.     IMPRESSION: See comment.      ASSESSMENT/PLAN   Impression   Anemia, heme negative  Plan  - CT negative for inflamm process   - will discuss endoscopic evaluation with family  - hematology evaluation       AUTHOR:  Hubert Fuchs MD  10/10/2024

## 2024-10-10 NOTE — CONSULTS
Podiatric Surgery Consult Note    Subjective      Sofie Stewart is a 89 y.o. female who was admitted for Delusion (CMS/[facility])    Hallucination    Anemia, unspecified type    Edema, unspecified type. Patient was consulted for BLE wounds with venous stasis changes.  Patient is very hard of hearing so somewhat more difficult obtaining history.  Patient states that she does not see a podiatrist for wound care.  Patient notes that she is unsure of exactly how long she has had the wounds and skin changes to her legs.  She also was not aware that she had a wound to the bottom of her left heel until today.  She denies any other pedal complaints and denies any N/V/F/C/CP/SOB.      Review of Systems:   Constitutional:  Negative for fevers, chills   Cardiovascular: Negative for chest pain, SOB   Gastrointestinal: Negative for nausea and vomiting.   Musculoskeletal: Negative for tenderness in any raquel prominences, joints, tendons, myalgias      Medical History:   Past Medical History:   Diagnosis Date    Hypertension     Hypothyroidism     Lipid disorder        Surgical History: No past surgical history on file.    Social History:   Social History     Socioeconomic History    Marital status:      Spouse name: None    Number of children: None    Years of education: None    Highest education level: None   Tobacco Use    Smoking status: Never    Smokeless tobacco: Never     Social Drivers of Health     Food Insecurity: Patient Unable To Answer (10/8/2024)    Hunger Vital Sign     Worried About Running Out of Food in the Last Year: Patient unable to answer     Ran Out of Food in the Last Year: Patient unable to answer   Transportation Needs: No Transportation Needs (10/9/2024)    PRAPARE - Transportation     Lack of Transportation (Medical): No     Lack of Transportation (Non-Medical): No   Housing Stability: Unknown (10/9/2024)    Housing Stability Vital Sign     Unable to Pay for Housing in the Last Year: No      Homeless in the Last Year: No       Family History: No family history on file.    Allergies: No Known Allergies    Home Medications:   acetaminophen (TYLENOL) tablet 650 mg  650 mg oral q4h PRN    ammonium lactate (AMLACTIN) 12 % cream   Topical BID    enoxaparin (LOVENOX) syringe 40 mg  40 mg subcutaneous Daily (6p)    ferric gluconate (FERRLECIT) 125 mg in sodium chloride 0.9 % 100 mL IVPB  125 mg intravenous Daily    fluticasone propionate (FLONASE) 50 mcg/actuation nasal spray 1 spray  1 spray Each Nostril Daily    levothyroxine (SYNTHROID) tablet 88 mcg  88 mcg oral Daily (6:30a)    pantoprazole (PROTONIX) tablet,delayed release (DR/EC) 20 mg  20 mg oral BID    pravastatin (PRAVACHOL) tablet 20 mg  20 mg oral Nightly    sennosides-docusate sodium (SENOKOT-S) 8.6-50 mg per tablet 2 tablet  2 tablet oral Daily PRN       Current Medications:  Current Facility-Administered Medications   Medication Dose Route Frequency Provider Last Rate Last Admin    acetaminophen (TYLENOL) tablet 650 mg  650 mg oral q4h PRN Sofie Cooper MD   650 mg at 10/09/24 1817    ammonium lactate (AMLACTIN) 12 % cream   Topical BID Lazaro Salcedo MD   Given at 10/10/24 0845    enoxaparin (LOVENOX) syringe 40 mg  40 mg subcutaneous Daily (6p) Sofie Cooper MD   40 mg at 10/09/24 1817    ferric gluconate (FERRLECIT) 125 mg in sodium chloride 0.9 % 100 mL IVPB  125 mg intravenous Daily Lazaro Salcedo MD   Stopped at 10/10/24 0944    fluticasone propionate (FLONASE) 50 mcg/actuation nasal spray 1 spray  1 spray Each Nostril Daily Sofie Cooper MD   1 spray at 10/10/24 0845    levothyroxine (SYNTHROID) tablet 88 mcg  88 mcg oral Daily (6:30a) Sofie Cooper MD   88 mcg at 10/10/24 0600    pantoprazole (PROTONIX) tablet,delayed release (DR/EC) 20 mg  20 mg oral BID Sofie Cooper MD   20 mg at 10/10/24 0844    pravastatin (PRAVACHOL) tablet 20 mg  20 mg oral Nightly Sofie Cooper MD   20 mg at 10/09/24  2244    sennosides-docusate sodium (SENOKOT-S) 8.6-50 mg per tablet 2 tablet  2 tablet oral Daily PRN Sofie Cooper MD           Last documented Vital Signs:  Vitals    10/10/24 0745 10/10/24 1115 10/10/24 1120 10/10/24 1530   BP: (!) 150/68 (!) 141/64 (!) 166/74 (!) 146/65   Pulse: 69 78 76 74   Resp: 16      Temp: 36.4 °C (97.5 °F)   37 °C (98.6 °F)   SpO2: 95% 95% 94% 96%       Labs:  CBC Results         10/10/24 10/09/24 10/08/24     0317 0555 1922    WBC 5.62 6.35 6.06    RBC 2.75 2.70 2.51    HGB 7.8 7.4 6.7    HCT 23.9 23.2 21.7    MCV 86.9 85.9 86.5    MCH 28.4 27.4 26.7    MCHC 32.6 31.9 30.9     430 528           Comment for HGB at 1922 on 10/08/24: This result has been called to Myrna WRIGHT by Camille on 10/08/2024 19:59:30, and has been read back.           Microbiology Results       ** No results found for the last 720 hours. **            Imaging:  I have reviewed the imaging from the last 24 hours    Objective       -Vascular: DP pulses are 1/4 palpable B/L. PT pulses are faintly palpable B/L.   Mild B/L edema to the foot, ankle and leg. Capillary refill time is less than 3 seconds B/L.   Skin temperature is warm to warm from leg to toes B/L.   -Ortho: + active df/pf of all digits B/L. Ankle ROM slightly decreased B/L. MMT slightly decreased in all planes tested B/L.  Pain on palpation over the plantar left heel  Foot deformities  Equinus noted B/L.   No pain noted in the posterior calf upon palpation.   -Neuro: Light touch and protective sensation is intact B/L    -Derm:   BLE: Chronic venous stasis changes with severe xerosis  RLE:  -Superficial granular wound measuring approximately 2cm x 1 cm x 0.1 cm to the medial calf.  No deep probing, drainage, fluctuance, crepitus, malodor noted.  -4 superficial granular wounds noted to the anterior right shin each measuring approximately 1 cm x 0.5 cm x 0.1 cm. No deep probing, drainage, fluctuance, crepitus, malodor noted.  -Very early  stages of DTI to plantar heel with surrounding xerotic skin. No deep probing, drainage, fluctuance, crepitus, malodor noted.  LLE:  -Full-thickness fibrogranular wound noted to plantar heel measuring approximately 0.9 cm x 0.5 cm x 0.3 cm.  Wound does not probe deep.  Minimal serous drainage noted.  No fluctuance, crepitus, malodor noted.  Wound edges are hyperkeratotic.  -Superficial granular wound noted to posterior leg measuring approximately 4 cm x 4 cm x 0.1 cm. No deep probing, drainage, fluctuance, crepitus, malodor noted.     See media tab for clinical pictures.       ASSESSMENT:  89 y.o. female being consulted for BLE venous stasis skin changes and wounds and left heel ulcer    PLAN:  -Patient was evaluated and treated with all questions and concerns addressed  -Patient was dressed with betadine and Mepilex to left heel, Mepilex to right heel, and Xeroform with 4 x 4 gauze and Rosa to bilateral legs.  Bilateral legs wrapped with Ace wrap for compression. Dressings will be changed while in house freq: Every 2 to 3 days  -Patient may weight bear to BLE.   -Labs reviewed: See above  -X-rays ordered: Pending  -NATHALIE/PVR ordered: Pending  -DVT prophylaxsis and pain control per primary team.  -Rest of care per primary team  -Thank you for this consult  -Please contact on call podiatry resident with any questions or concerns.    Eliseo Pradhan DPM, PGY-2  Pager #[phone]

## 2024-10-10 NOTE — PLAN OF CARE
Plan of Care Review  Progress: no change  Outcome Evaluation: Pt AAOx3 forgetful.  Pt denies pain.  Tolerating regular diet.  NPO after MN for endo.  PT and OT worked with pt.  Unable to get her OOB.  q2h turning.  Podiatry changed dressings b/lLE CDI.  Pt in bed with alarm on and call bell in reach

## 2024-10-10 NOTE — ED ATTESTATION NOTE
Procedures  Physical Exam  Review of Systems  Medical Decision Making        I have personally seen and examined the patient.  I personally performed the key components of the encounter and provided a substantive portion of the care and medical decision making for this patient. I reviewed and agree with the PA/NP/Resident's assessment and plan of care, with any exceptions as documented below    My focused history, examination, assessment, and plan of care of Sofie Stewart is as follows:  The history is provided by Patient  The patient is a 89 y.o. who comes to the ED for mental status change, hallucinations, patient was doing well until a few days ago and started to decrease her p.o. intake, now with significant delusions, significant thoughts that her son is out to get her.  Patient otherwise denying any chest pain, belly pain, denies nausea or vomiting, no burning on urination.  No headache.      Pertinent past medical history includes:    Past Medical History:   Diagnosis Date    Hypertension     Hypothyroidism     Lipid disorder          No past surgical history on file.    Exam: Patient somewhat paranoid, reporting things that are not there with hallucinations noted.      Impression/Plan/Medical decision making/ED course: 89-year-old presenting with new hallucinations, progressive symptoms, patient with prior mechanical fall, patient worked up with differential including consideration for occult infections, possibility of intracranial or intra-abdominal findings, patient subsequently worked up with anemia, no other clear source, patient subsequently admitted for further evaluation and workup for her acute changes to her mental status.     After my initial evaluation, the patient requires testing, treatment and/or serial reevaluations to rule in or out the need for admission or emergency surgery.    The patient We spoke about the need for hospitalization    Medications   sodium chloride 0.9 % infusion  (has no administration in time range)   pravastatin (PRAVACHOL) tablet 20 mg (20 mg oral Given 10/9/24 2244)   sennosides-docusate sodium (SENOKOT-S) 8.6-50 mg per tablet 2 tablet (has no administration in time range)   bethanechol (URECHOLINE) tablet 10 mg ( oral Dose Auto Held 10/15/24 2000)   fluticasone propionate (FLONASE) 50 mcg/actuation nasal spray 1 spray (1 spray Each Nostril Given 10/9/24 1003)   levothyroxine (SYNTHROID) tablet 88 mcg (88 mcg oral Given 10/9/24 0555)   glucose chewable tablet 16-32 g of dextrose (has no administration in time range)     Or   dextrose 40 % oral gel 15-30 g of dextrose (has no administration in time range)     Or   glucagon (GLUCAGEN) injection 1 mg (has no administration in time range)     Or   dextrose 50 % in water (D50) injection 12.5 g (has no administration in time range)   enoxaparin (LOVENOX) syringe 40 mg (40 mg subcutaneous Given 10/9/24 1817)   acetaminophen (TYLENOL) tablet 650 mg (650 mg oral Given 10/9/24 1817)   pantoprazole (PROTONIX) tablet,delayed release (DR/EC) 20 mg (20 mg oral Given 10/9/24 2027)   ammonium lactate (AMLACTIN) 12 % cream ( Topical Given 10/9/24 2027)   ferric gluconate (FERRLECIT) 125 mg in sodium chloride 0.9 % 100 mL IVPB (0 mg intravenous Stopped 10/9/24 1117)   potassium chloride (KLOR-CON M) ER tablet (particles/crystals) 40 mEq (40 mEq oral Given 10/9/24 1003)   magnesium sulfate IVPB 1g in 100 mL NSS/D5W/SWFI (0 g intravenous Stopped 10/9/24 1117)   iopamidoL (ISOVUE-370) 370 mg iodine /mL (76 %) injection 100 mL (100 mL intravenous Given 10/9/24 1152)         ECG review: ECG read/interpreted by me: Sinus rhythm with sinus arrhythmia  ECG 12 lead          While here I have   Reviewed previous records in our system:  Reviewed prior outpatient and ER visits  Reviewed care everywhere for outside records: Reviewed prior outpatient and ER visits  Reviewed and interpreted lab results: agree with, independently interpreted, and  reviewed below  Labs Reviewed   CBC AND DIFF - Abnormal       Result Value    WBC 6.06      RBC 2.51 (*)     Hemoglobin 6.7 (*)     Hematocrit 21.7 (*)     MCV 86.5      MCH 26.7 (*)     MCHC 30.9 (*)     RDW 15.0 (*)     Platelets 528 (*)     MPV 9.4      Differential Type Auto      nRBC 0.0      Immature Granulocytes 0.5      Neutrophils 76.4      Lymphocytes 13.7      Monocytes 6.9      Eosinophils 1.8      Basophils 0.7      Immature Granulocytes, Absolute 0.03      Neutrophils, Absolute 4.63      Lymphocytes, Absolute 0.83 (*)     Monocytes, Absolute 0.42      Eosinophils, Absolute 0.11      Basophils, Absolute 0.04      PLT Morphology Normal      Platelet Estimate Increased (>400,000)      Anisocytosis 1+      Hypochromia Occasional      Acanthocytes Occasional      Polychromasia Occasional      Stomatocytes Occasional      Target Cells Occasional      Teardrop Cells Occasional     COMPREHENSIVE METABOLIC PANEL - Abnormal    Sodium 136      Potassium 3.6      Chloride 104      CO2 24      BUN 15      Creatinine 0.6      Glucose 109 (*)     Calcium 8.5 (*)     AST (SGOT) 15      ALT (SGPT) 12      Alkaline Phosphatase 126 (*)     Total Protein 6.5      Albumin 3.4 (*)     Bilirubin, Total 0.6      eGFR >60.0      Anion Gap 8     UA REFLEX CULTURE (MACROSCOPIC) - Abnormal    Color, Urine Yellow      Clarity, Urine Clear      Specific Gravity, Urine 1.022      pH, Urine 6.5      Leukocyte Esterase Negative      Nitrite, Urine Negative      Protein, Urine Trace (*)     Glucose, Urine Negative      Ketones, Urine +1 (*)     Urobilinogen, Urine 0.2      Bilirubin, Urine Negative      Blood, Urine Negative     HIGH SENSITIVE TROPONIN I (BASELINE - REFLEX 2HR) - Abnormal    High Sens Troponin I 17.8 (*)    B-TYPE NATRIURETIC PEPTIDE - Abnormal     (*)    UA MICROSCOPIC (UCU) - Abnormal    RBC, Urine 0 TO 4      WBC, Urine 0 TO 3      Squamous Epithelial Rare (*)     Hyaline Cast 3 TO 9 (*)     Bacteria, Urine  Rare (*)     Mucus +2 (*)    HIGH SENSITIVE TROPONIN I (NO REFLEX) - Abnormal    High Sens Troponin I 17.0 (*)    IRON AND TIBC - Abnormal    Iron 14 (*)     TIBC 396      UIBC 382 (*)     Iron Saturation 4 (*)    HAPTOGLOBIN - Abnormal    Haptoglobin 225.0 (*)    CBC - Abnormal    WBC 6.35      RBC 2.70 (*)     Hemoglobin 7.4 (*)     Hematocrit 23.2 (*)     MCV 85.9      MCH 27.4 (*)     MCHC 31.9 (*)     RDW 14.7 (*)     Platelets 430 (*)     MPV 9.7     BASIC METABOLIC PANEL - Abnormal    Sodium 137      Potassium 3.5      Chloride 105      CO2 23      BUN 13      Creatinine 0.6      Glucose 72      Calcium 7.9 (*)     eGFR >60.0      Anion Gap 9     RETICULOCYTE HEMOGLOBIN - Abnormal    Reticulocyte Hemoglobin 21.6 (*)    VITAMIN B12 - Normal    Vitamin B-12 900     MAGNESIUM - Normal    Magnesium 1.9     TSH 3RD GENERATION W/REFLEX FT4 - Normal    TSH 1.56     RETICULOCYTE COUNT - Normal    Retic, Absolute Count 0.0663      Retic Count(%) 2.52     FOLATE - Normal    Folate 12.4     FERRITIN - Normal    Ferritin 18     URINALYSIS REFLEX CULTURE (ED AND OUTPATIENT ONLY)    Narrative:     The following orders were created for panel order UA with Reflex Culture.  Procedure                               Abnormality         Status                     ---------                               -----------         ------                     UA Reflex to Culture (Ma...[[id number]]  Abnormal            Final result               UA Microscopic[[id number]]               Abnormal            Final result                 Please view results for these tests on the individual orders.   TYPE AND SCREEN    Specimen Expiration 10/11/2024      Antibody Screen Negative      ABO O      Rh Factor Positive      History Check No type on file     REPEAT ABO/RH (TYPE CHECK)    ABO O      Rh Factor Positive     EXTRA TUBES    Narrative:     The following orders were created for panel order Extra Tubes.  Procedure                                Abnormality         Status                     ---------                               -----------         ------                     Extra Tube Lt Green[[id number]]                              In process                   Please view results for these tests on the individual orders.   OCCULT BLOOD X 1, STOOL   ERYTHROPOIETIN   CBC AND DIFF   PREPARE RBC    Product Code [id number]      Unit ID [id number]      Unit ABO O      Unit RH Positive      Crossmatch Compatible      Product Status IS      Unit Expiration Date/Time [id number]      ISBT Code 5100     FLOW CYTOMETRY BLOOD ONLY   EXTUB LT GREEN     Reviewed and interpreted xrays, CT, MRI, US:  agree with, independently interpreted, and reviewed below  CT ABDOMEN PELVIS WITH IV CONTRAST   Final Result   IMPRESSION:   1. No acute inflammatory process in the abdomen or pelvis.      2. Small bilateral pleural effusions with associated atelectasis      3. Moderate sized hiatal hernia               X-RAY WRIST LEFT 2 VIEWS   Final Result   IMPRESSION: See comment.      Ultrasound venous leg bilateral   Final Result   IMPRESSION:   No deep venous thrombosis in the lower extremities. Subcutaneous edema in the   calves.      CT HEAD WITHOUT IV CONTRAST   Final Result   IMPRESSION:   1. There is no acute intracranial abnormality.  No intracranial hemorrhage or   other evidence for traumatic injury.   2. Moderate microvascular ischemic white matter changes are present with   moderate global cerebral volume loss.      ECG 12 lead           Compared lab results to previous lab results: .  Compared image results to previous results: .  Called and spoke with a consultant or physician about this case: N/A    Vital Signs Review: Vital signs have been reviewed. The oxygen saturation is SpO2: SpO2: 97 %   which is Normal      I was physically present for the key/critical portions of the following procedures: none     Herminio Farnsworth MD  10/10/24 0106

## 2024-10-10 NOTE — PROGRESS NOTES
Hospital Medicine Service -  Daily Progress Note       SUBJECTIVE   Interval History:   10/10  Patient feels better.  No longer hallucinating.  She does state that she has pain in her feet, and she has ulcers on her heels.  I then examine her heels and indeed she has ulcers, mild 1 on her right heel and a deeper 1 on her left.  Discussed with her that I will get podiatry on board.     OBJECTIVE      Vital signs in last 24 hours:  Temp:  [36.2 °C (97.2 °F)-36.7 °C (98 °F)] 36.4 °C (97.5 °F)  Heart Rate:  [69-78] 69  Resp:  [13-18] 16  BP: (136-150)/(63-68) 150/68    Intake/Output Summary (Last 24 hours) at 10/10/2024 0818  Last data filed at 10/9/2024 2000  Gross per 24 hour   Intake 10 ml   Output --   Net 10 ml       PHYSICAL EXAMINATION        Constitutional: Awake, alert.  No significant distress.  HEMNT: Mucous membranes moist.  Head: Normocephalic, atraumatic.  Eyes: EOM normal.  Positive pallor seen.  Neck: Supple.   Cardiovascular: Normal rate and regular rhythm. No murmur heard.  Pulmonary/Chest: Effort normal, breath sounds normal. No respiratory distress. No wheezes. No rales.  Abdominal: Soft. No tenderness.  Musculoskeletal: Moderate edema.  Mild tenderness.  Bilateral heels have small ulcers, very superficial on the right, and slightly deeper on the left.  Neurological: Awake, alert, oriented x 3. No focal deficits.  Globally weak.  Skin: Positive pallor.  Venous stasis rash bilateral lower extremities.  Bilateral heel decubiti as above.     LINES, CATHETERS, DRAINS, AIRWAYS, AND WOUNDS   Lines, Drains, and Airways:  Wounds (agree with documentation and present on admission):  Peripheral IV (Adult) 10/08/24 Right Antecubital (Active)   Number of days: 2       Peripheral IV (Adult) 10/08/24 Anterior;Left;Proximal Forearm (Active)   Number of days: 2       External Urinary Catheter Female (one size) (Active)   Number of days: 2       Wound Pressure Injury Right Heel (Active)   Number of days: 1        Wound Pressure Injury Left Heel (Active)   Number of days: 1       Wound Abrasion Right Calf (Active)   Number of days: 1       Wound Abrasion Right Pretibial (Active)   Number of days: 1       Wound Blister(s) Proximal;Right Pretibial (Active)   Number of days: 1       Wound Abrasion Anterior;Left Ankle (Active)   Number of days: 1       Wound Blister(s) Left Pretibial (Active)   Number of days: 1            LABS / IMAGING / TELE      Labs  BMP Results         10/09/24 10/08/24 08/28/24     0555 1922 0707     136 140    K 3.5 3.6 3.9    Cl 105 104 103    CO2 23 24 29    Glucose 72 109 96    BUN 13 15 40    Creatinine 0.6 0.6 0.8    Calcium 7.9 8.5 8.9    Anion Gap 9 8 8    EGFR >60.0 >60.0 >60.0           Comment for K at 0555 on 10/09/24: Results obtained on plasma. Plasma Potassium values may be up to 0.4 mEQ/L less than serum values. The differences may be greater for patients with high platelet or white cell counts.    Comment for K at 1922 on 10/08/24: Results obtained on plasma. Plasma Potassium values may be up to 0.4 mEQ/L less than serum values. The differences may be greater for patients with high platelet or white cell counts.    Comment for EGFR at 0555 on 10/09/24: Calculation based on the Chronic Kidney Disease Epidemiology Collaboration (CKD-EPI) equation refit without adjustment for race.    Comment for EGFR at 1922 on 10/08/24: Calculation based on the Chronic Kidney Disease Epidemiology Collaboration (CKD-EPI) equation refit without adjustment for race.    Comment for EGFR at 0707 on 08/28/24: Calculation based on the Chronic Kidney Disease Epidemiology Collaboration (CKD-EPI) equation refit without adjustment for race.            CBC Results         10/10/24 10/09/24 10/08/24     0317 0555 1922    WBC 5.62 6.35 6.06    RBC 2.75 2.70 2.51    HGB 7.8 7.4 6.7    HCT 23.9 23.2 21.7    MCV 86.9 85.9 86.5    MCH 28.4 27.4 26.7    MCHC 32.6 31.9 30.9     430 528           Comment for HGB  at 1922 on 10/08/24: This result has been called to Myrna WRIGHT by Camille on 10/08/2024 19:59:30, and has been read back.               Lab work reviewed by myself      Imaging  CT ABDOMEN PELVIS WITH IV CONTRAST   Final Result   IMPRESSION:   1. No acute inflammatory process in the abdomen or pelvis.      2. Small bilateral pleural effusions with associated atelectasis      3. Moderate sized hiatal hernia               X-RAY WRIST LEFT 2 VIEWS   Final Result   IMPRESSION: See comment.      Ultrasound venous leg bilateral   Final Result   IMPRESSION:   No deep venous thrombosis in the lower extremities. Subcutaneous edema in the   calves.      CT HEAD WITHOUT IV CONTRAST   Final Result   IMPRESSION:   1. There is no acute intracranial abnormality.  No intracranial hemorrhage or   other evidence for traumatic injury.   2. Moderate microvascular ischemic white matter changes are present with   moderate global cerebral volume loss.      ECG 12 lead             Radiology reviewed by myself      ASSESSMENT AND PLAN      * Toxic encephalopathy  Assessment & Plan  Patient was expressing paranoid thoughts which is unusual for her  Normal UA and normal CT scan of the brain  She is in a new location and is hard of hearing and probably visually impaired as well  These could cause confusion  We will start some PT and OT tomorrow  Zyprexa as needed if she becomes delirious although presently she appears to be calm and oriented    10/9  Hold bethanechol.  Monitor closely.    10/10  Resolved with stopping bethanechol.    Debility  Assessment & Plan  PT OT    Skin ulcer of heel, limited to breakdown of skin (CMS/HCC)  Assessment & Plan  10/10  Present on admission.  Will consult podiatry for help with this case.    Pain and swelling of left wrist  Assessment & Plan  10/9  Patient states she fell recently.  Check x-ray to rule out fracture.    10/10  X-rays negative for fracture.    Edema  Assessment & Plan  10/9  Probably  secondary to Norvasc use.  Check ultrasound rule out DVT.  Start AmLactin, Ace wrap.    10/10  In March 2024, her echocardiogram was normal.  Ultrasound negative for DVT.  Started AmLactin, Ace wrap.  Today, already some improvement of the edema.  Continue.    Iron deficiency anemia  Assessment & Plan  Hemoglobin of 6.7  Her hemoglobin was 11 with her last admission to the hospital, this was in the setting of a pelvic ramus fracture  Normocytic  Mild thrombocytosis  Unclear etiology, she was heme-negative in the emergency room  They had started a blood transfusion before I could put in basic blood work however they did trial it as she was getting the blood transfusion  Follow-up on haptoglobin, iron level, B12 level  Hematology consultation  1 more stool for Hemoccult  Recheck CBC in the morning after receiving blood transfusion this evening  Empiric PPI in case this is associated GI loss    10/9  Severe iron deficiency anemia noted.  Hematology consulted by admitting team.  Will also consult GI.  Status post 1 unit PRBCs.  Hemoglobin 7.4 thereafter.  Start IV iron.    10/10  Appreciate GI, hematology input.  CT abdomen and pelvis negative.  Possible endoscopic evaluation.  Status post 1 unit PRBCs, hemoglobin is stable thereafter.  Receiving IV iron.    HTN (hypertension)  Assessment & Plan  Blood pressure is well-controlled continue Norvasc 2.5 mg daily    10/10  According to pharmacist, patient is no longer on any medications for hypertension.  Stopped the Norvasc, as this can lead to significant edema of the lower extremities.    Hypothyroid  Assessment & Plan  TSH 4 months ago was within normal limits  Continue 88 mcg of Synthroid  Recheck TSH in setting of her paranoid delusions    10/9  TSH normal.  Continue current doses of Synthroid.         VTE Assessment: Padua    VTE Prophylaxis:  Current anticoagulants:  enoxaparin (LOVENOX) syringe 40 mg, subcutaneous, Daily (6p)      Code Status: Full Code       Estimated Discharge Date: 10/11/2024   Disposition Planning:   10/10  Toxic encephalopathy.  Resolved after stopping bethanechol.    Iron deficiency anemia.  Status post 1 unit PRBCs.  Currently on IV iron.  Continue workup.    Bilateral heel ulcers.  Consult podiatry.    Stasis dermatitis of bilateral lower extremities.  Continue AmLactin, Ace wrap.    Total Time Spent in Patient's Care:  More than 50 minutes were spent at the time of this dictation, which included: reviewing HPI, PMHx, Social Hx, Fhx, VTE prophylaxis, medications, allergies, pertinent diagnostic studies in electronic medical record since this hospitalization; time spent in evaluating the patient and documenting in the medical record. Reviewed plan of care with nursing personnel. Time included counseling, coordinating care, discussing progress, prognosis and further plan of care with the patient.     Lazaro Salcedo MD  10/10/2024

## 2024-10-10 NOTE — PLAN OF CARE
Problem: Adult Inpatient Plan of Care  Goal: Plan of Care Review  Outcome: Progressing  Flowsheets (Taken 10/10/2024 1254)  Progress: no change  Outcome Evaluation: PT evaluation complete.  Recommending SNF when stable for d/c.  Plan of Care Reviewed With: patient     Problem: Mobility Impairment  Goal: Optimal Mobility  Outcome: Progressing

## 2024-10-10 NOTE — HOSPITAL COURSE
Sofie is a 89 y.o. female admitted on 10/8/2024 with Delusion (CMS/[facility]) [F22]  Hallucination [R44.3]  Anemia, unspecified type [D64.9]  Edema, unspecified type [R60.9]. Principal problem is Toxic encephalopathy.    Past Medical History  Sofie has a past medical history of Hypertension, Hypothyroidism, and Lipid disorder.    History of Present Illness   89 y.o. female with a past medical history of hypertension and hypercholesterolemia who recently was transition to assisted living from independent living.  She began having delusions and hallucinations at the assisted living.  This is unlike her as she does not have a history of dementia or mental health issues.  She was expressing thoughts that she thought her son was out to get her money and planning to kill her.  She also states that she was hearing voices and having visual hallucinations where she was seeing red spots.  When I saw her she was trying to sleep.  She is extraordinarily hard of hearing.  She appears to be lucid for the ER staff and for the nursing staff.  The family had expressed to the ER that this is unusual behavior for her and only started after she moved to assisted living the last couple weeks.  The patient had a negative evaluation in the emergency room but was severely anemic with a hemoglobin of 6.7.  The patient states that she may have been having dark's stools but she is not sure.  She was heme-negative in the emergency room.

## 2024-10-10 NOTE — CONSULTS
Nutrition Status Classification: Mild nutritional compromise     Clinical Course: Patient is a 89 y.o. female who was admitted on 10/8/2024 with a diagnosis of Delusion (CMS/HCC) [F22]  Hallucination [R44.3]  Anemia, unspecified type [D64.9]  Edema, unspecified type [R60.9].   Past Medical History:   Diagnosis Date    Hypertension     Hypothyroidism     Lipid disorder      No past surgical history on file.    Nutrition Interventions/Recommendations:   Continue Regular diet  - add Ensure Plus HP (350 kcals / 20 g protein each) once daily  - monitor tolerance/intakes  2.Treat labs/lytes   - correct/replace prn  - Iron-containing foods reviewed and list attached to AVSS  3. St ll/lll  - add Casey orange (90 kcals, 14 g amino acids [7 g L- Arginine and 7 g L-Glutamine] + 2.5 g Collagen Protein + 1.5 g HMB, 9.5 mg zinc, 300 mg vitamin C, 8 g CHO each) once daily  - wound care as ordered  4. Obtain admission weight         Dietary Orders   (From admission, onward)                 Start     Ordered    10/09/24 0146  Adult Diet Regular; RD/LDN may adjust order  Diet effective now        References:    IDDSI Diet reference   Question Answer Comment   Diet Texture Regular    Delegation of Authority. Diet orders written by PA/CRNPs may not be adjusted by RD/LDNs. RD/LDN may adjust order        10/09/24 0145                    Reason for Assessment  Reason For Assessment: identified at risk by screening criteria (PI)  Diagnosis: other (see comments)    [facility] Nutrition Screen Tool  Has patient lost weight without trying?: 0-->No  Has patient been eating poorly due to decreased appetite?: 0-->No  [facility] Nutrition Screen Score: 0    Nutrition/Diet History  Diet Prior to Admission: regular  Appetite Prior to Admission: [id number]-%  Intake (%): 100%  Meal/Snack Patterns: 3 meals  Functional Status:  (from retirement)  Food Allergies: no known food allergies  Factors Affecting Nutritional Intake:  (none reported)    Physical  "Findings  Overall Physical Appearance: well developed  Gastrointestinal:  (no complaints)  Last Bowel Movement: 10/08/24  Skin: pressure injury (st ll R heel; st lll R heel)    Last Bowel Movement: 10/08/24    Nutrition Order  Nutrition Order: meets nutritional requirements  Nutrition Order Comments: regular    Anthropometrics  Height: 154.9 cm (5' 1\")      Admit Weight  Admit Weight:  (not recorded)         Ideal Body Weight (IBW)  Ideal Body Weight (IBW) (kg): 48.15                                  Labs/Procedures/Meds  Lab Results Reviewed: reviewed      Results from last 7 days   Lab Units 10/09/24  0555 10/08/24  1922   SODIUM mEQ/L 137 136   POTASSIUM mEQ/L 3.5 3.6   CHLORIDE mEQ/L 105 104   CO2 mEQ/L 23 24   BUN mg/dL 13 15   CREATININE mg/dL 0.6 0.6   GLUCOSE mg/dL 72 109*   CALCIUM mg/dL 7.9* 8.5*      Results from last 7 days   Lab Units 10/08/24  1922   ALK PHOS IU/L 126*   BILIRUBIN TOTAL mg/dL 0.6   ALBUMIN g/dL 3.4*   ALT IU/L 12   AST IU/L 15          No results found for: \"HGBA1C\"  Lab Results   Component Value Date    OMGIDPTL04 900 10/08/2024     Lab Results   Component Value Date    CALCIUM 7.9 (L) 10/09/2024     Results from last 7 days   Lab Units 10/10/24  0317 10/09/24  0555 10/08/24  1922   WBC K/uL 5.62 6.35 6.06   HEMOGLOBIN g/dL 7.8* 7.4* 6.7*   HEMATOCRIT % 23.9* 23.2* 21.7*   PLATELETS K/uL 383* 430* 528*      Results from last 7 days   Lab Units 10/08/24  2334   IRON ug/dL 14*   TIBC ug/dL 396          Results from last 7 days   Lab Units 10/09/24  0555   MAGNESIUM mg/dL 1.9          Diagnostic Tests/Procedures  Diagnostic Test/Procedure Reviewed: reviewed    Medications  Pertinent Medications Reviewed: reviewed   ammonium lactate   Topical BID    enoxaparin  40 mg subcutaneous Daily (6p)    ferric gluconate (FERRLECIT) 125 mg in sodium chloride 0.9 % 100 mL IVPB  125 mg intravenous Daily    fluticasone propionate  1 spray Each Nostril Daily    levothyroxine  88 mcg oral Daily (6:30a) "    pantoprazole  20 mg oral BID    pravastatin  20 mg oral Nightly         Weights (last 7 days)       None              Clinical Comments:  Patient is a 89 y/o female admitted with AMS/hallucinations, found to be medication related and has since resolved.    Pt noted with st ll to R heel and st ll to her L heel on admit.  At visit RD discussed wounds and offered ONS to aid in healing.  The patient is willing to try any flavor ensure and the Casey.  She states her appetite is good. 100% intake noted for breakfast this AM on flowsheets.  No weight obtained so far, will request.  RD will add ONS as discussed and continue to follow for needs.        Date: 10/10/24  Signature: TWILA Templeton

## 2024-10-10 NOTE — ASSESSMENT & PLAN NOTE
- Present on admission.  - Pod on board, appreciate recs  - Xrays with superficial heel ulcer  - NATHALIE US showed Normal arterial perfusion to both ankles

## 2024-10-11 ENCOUNTER — APPOINTMENT (INPATIENT)
Dept: CARDIOLOGY | Facility: HOSPITAL | Age: 89
DRG: 383 | End: 2024-10-11
Payer: COMMERCIAL

## 2024-10-11 ENCOUNTER — ANESTHESIA (INPATIENT)
Dept: ENDOSCOPY | Facility: HOSPITAL | Age: 89
DRG: 383 | End: 2024-10-11
Payer: COMMERCIAL

## 2024-10-11 ENCOUNTER — ANESTHESIA EVENT (INPATIENT)
Dept: ENDOSCOPY | Facility: HOSPITAL | Age: 89
DRG: 383 | End: 2024-10-11
Payer: COMMERCIAL

## 2024-10-11 PROBLEM — K27.9 PUD (PEPTIC ULCER DISEASE): Status: ACTIVE | Noted: 2024-10-11

## 2024-10-11 LAB
ANION GAP SERPL CALC-SCNC: 7 MEQ/L (ref 3–15)
ATRIAL RATE: 84
BASOPHILS # BLD: 0.03 K/UL (ref 0.01–0.1)
BASOPHILS NFR BLD: 0.5 %
BSA FOR ECHO PROCEDURE: 1.78 M2
BUN SERPL-MCNC: 15 MG/DL (ref 7–25)
CALCIUM SERPL-MCNC: 7.8 MG/DL (ref 8.6–10.3)
CASE RPRT: NORMAL
CHLORIDE SERPL-SCNC: 104 MEQ/L (ref 98–107)
CLINICAL INFO: NORMAL
CO2 SERPL-SCNC: 24 MEQ/L (ref 21–31)
CREAT SERPL-MCNC: 0.7 MG/DL (ref 0.6–1.2)
DIFFERENTIAL METHOD BLD: ABNORMAL
EGFRCR SERPLBLD CKD-EPI 2021: >60 ML/MIN/1.73M*2
EOSINOPHIL # BLD: 0.04 K/UL (ref 0.04–0.36)
EOSINOPHIL NFR BLD: 0.6 %
EPO SERPL-ACNC: 66.9 MIU/ML (ref 2.6–18.5)
ERYTHROCYTE [DISTWIDTH] IN BLOOD BY AUTOMATED COUNT: 15.7 % (ref 11.7–14.4)
GLUCOSE SERPL-MCNC: 93 MG/DL (ref 70–99)
HCT VFR BLD AUTO: 24.8 % (ref 35–45)
HGB BLD-MCNC: 7.9 G/DL (ref 11.8–15.7)
IMM GRANULOCYTES # BLD AUTO: 0.05 K/UL (ref 0–0.08)
IMM GRANULOCYTES NFR BLD AUTO: 0.8 %
LEFT ABI: 1.47
LEFT DORSALIS PEDIS INDEX: 1.33
LEFT DORSALIS PEDIS: 191 MMHG
LEFT POST TIBIAL INDEX: 1.47
LEFT POSTERIOR TIBIAL: 212 MMHG
LYMPHOCYTES # BLD: 0.76 K/UL (ref 1.2–3.5)
LYMPHOCYTES NFR BLD: 11.9 %
MCH RBC QN AUTO: 27.8 PG (ref 28–33.2)
MCHC RBC AUTO-ENTMCNC: 31.9 G/DL (ref 32.2–35.5)
MCV RBC AUTO: 87.3 FL (ref 83–98)
MONOCYTES # BLD: 0.5 K/UL (ref 0.28–0.8)
MONOCYTES NFR BLD: 7.8 %
NEUTROPHILS # BLD: 5.03 K/UL (ref 1.7–7)
NEUTS SEG NFR BLD: 78.4 %
NRBC BLD-RTO: 0 %
P AXIS: 105
PATH INTERP SPEC-IMP: NORMAL
PLATELET # BLD AUTO: 327 K/UL (ref 150–369)
PMV BLD AUTO: 9.2 FL (ref 9.4–12.3)
POTASSIUM SERPL-SCNC: 3.9 MEQ/L (ref 3.5–5.1)
PR INTERVAL: 148
QRS DURATION: 82
QT INTERVAL: 386
QTC CALCULATION(BAZETT): 456
R AXIS: 26
RBC # BLD AUTO: 2.84 M/UL (ref 3.93–5.22)
RIGHT ABI: 1.37
RIGHT ARM BP: 144 MMHG
RIGHT DORSALIS PEDIS INDEX: 1.29
RIGHT DORSALIS PEDIS: 186 MMHG
RIGHT POST TIBIAL INDEX: 1.37
RIGHT POSTERIOR TIBIAL: 197 MMHG
SODIUM SERPL-SCNC: 135 MEQ/L (ref 136–145)
T WAVE AXIS: 16
VENTRICULAR RATE: 84
WBC # BLD AUTO: 6.41 K/UL (ref 3.8–10.5)

## 2024-10-11 PROCEDURE — 36415 COLL VENOUS BLD VENIPUNCTURE: CPT | Performed by: INTERNAL MEDICINE

## 2024-10-11 PROCEDURE — 12000000 HC ROOM AND CARE MED/SURG

## 2024-10-11 PROCEDURE — 71000001 HC PACU PHASE 1 INITIAL 30MIN: Performed by: INTERNAL MEDICINE

## 2024-10-11 PROCEDURE — 63600000 HC DRUGS/DETAIL CODE: Mod: JW | Performed by: NURSE ANESTHETIST, CERTIFIED REGISTERED

## 2024-10-11 PROCEDURE — 71000011 HC PACU PHASE 1 EA ADDL MIN: Performed by: INTERNAL MEDICINE

## 2024-10-11 PROCEDURE — 80048 BASIC METABOLIC PNL TOTAL CA: CPT | Performed by: INTERNAL MEDICINE

## 2024-10-11 PROCEDURE — 25800000 HC PHARMACY IV SOLUTIONS: Performed by: INTERNAL MEDICINE

## 2024-10-11 PROCEDURE — 37000002 HC ANESTHESIA MAC: Performed by: INTERNAL MEDICINE

## 2024-10-11 PROCEDURE — 99233 SBSQ HOSP IP/OBS HIGH 50: CPT | Performed by: INTERNAL MEDICINE

## 2024-10-11 PROCEDURE — 63600000 HC DRUGS/DETAIL CODE: Mod: JZ | Performed by: INTERNAL MEDICINE

## 2024-10-11 PROCEDURE — 0DJ08ZZ INSPECTION OF UPPER INTESTINAL TRACT, VIA NATURAL OR ARTIFICIAL OPENING ENDOSCOPIC: ICD-10-PCS | Performed by: INTERNAL MEDICINE

## 2024-10-11 PROCEDURE — 63700000 HC SELF-ADMINISTRABLE DRUG: Performed by: INTERNAL MEDICINE

## 2024-10-11 PROCEDURE — 85025 COMPLETE CBC W/AUTO DIFF WBC: CPT | Performed by: INTERNAL MEDICINE

## 2024-10-11 PROCEDURE — 93922 UPR/L XTREMITY ART 2 LEVELS: CPT

## 2024-10-11 PROCEDURE — 25000000 HC PHARMACY GENERAL: Performed by: NURSE ANESTHETIST, CERTIFIED REGISTERED

## 2024-10-11 RX ORDER — SODIUM CHLORIDE 9 MG/ML
5 INJECTION, SOLUTION INTRAVENOUS AS NEEDED
Status: DISCONTINUED | OUTPATIENT
Start: 2024-10-11 | End: 2024-10-11

## 2024-10-11 RX ORDER — LIDOCAINE HYDROCHLORIDE 10 MG/ML
INJECTION, SOLUTION EPIDURAL; INFILTRATION; INTRACAUDAL; PERINEURAL AS NEEDED
Status: DISCONTINUED | OUTPATIENT
Start: 2024-10-11 | End: 2024-10-11 | Stop reason: SURG

## 2024-10-11 RX ORDER — SODIUM CHLORIDE 9 MG/ML
INJECTION, SOLUTION INTRAVENOUS CONTINUOUS
Status: ACTIVE | OUTPATIENT
Start: 2024-10-11 | End: 2024-10-12

## 2024-10-11 RX ORDER — PROPOFOL 200MG/20ML
SYRINGE (ML) INTRAVENOUS AS NEEDED
Status: DISCONTINUED | OUTPATIENT
Start: 2024-10-11 | End: 2024-10-11 | Stop reason: SURG

## 2024-10-11 RX ORDER — PANTOPRAZOLE SODIUM 40 MG/1
40 TABLET, DELAYED RELEASE ORAL 2 TIMES DAILY
Status: COMPLETED | OUTPATIENT
Start: 2024-10-11 | End: 2024-10-16

## 2024-10-11 RX ADMIN — SODIUM CHLORIDE: 9 INJECTION, SOLUTION INTRAVENOUS at 11:37

## 2024-10-11 RX ADMIN — Medication: at 09:13

## 2024-10-11 RX ADMIN — LEVOTHYROXINE SODIUM 88 MCG: 0.09 TABLET ORAL at 05:58

## 2024-10-11 RX ADMIN — LIDOCAINE HYDROCHLORIDE 8 ML: 10 INJECTION, SOLUTION EPIDURAL; INFILTRATION; INTRACAUDAL; PERINEURAL at 13:43

## 2024-10-11 RX ADMIN — PROPOFOL 20 MG: 10 INJECTION, EMULSION INTRAVENOUS at 13:45

## 2024-10-11 RX ADMIN — PANTOPRAZOLE SODIUM 40 MG: 40 TABLET, DELAYED RELEASE ORAL at 20:47

## 2024-10-11 RX ADMIN — PRAVASTATIN SODIUM 20 MG: 20 TABLET ORAL at 20:47

## 2024-10-11 RX ADMIN — PROPOFOL 20 MG: 10 INJECTION, EMULSION INTRAVENOUS at 13:43

## 2024-10-11 RX ADMIN — FLUTICASONE PROPIONATE 1 SPRAY: 50 SPRAY, METERED NASAL at 09:08

## 2024-10-11 RX ADMIN — PANTOPRAZOLE SODIUM 20 MG: 20 TABLET, DELAYED RELEASE ORAL at 09:08

## 2024-10-11 RX ADMIN — ENOXAPARIN SODIUM 40 MG: 40 INJECTION SUBCUTANEOUS at 17:55

## 2024-10-11 RX ADMIN — SODIUM CHLORIDE 125 MG: 9 INJECTION, SOLUTION INTRAVENOUS at 09:08

## 2024-10-11 RX ADMIN — PROPOFOL 20 MG: 10 INJECTION, EMULSION INTRAVENOUS at 13:44

## 2024-10-11 ASSESSMENT — COGNITIVE AND FUNCTIONAL STATUS - GENERAL
STANDING UP FROM CHAIR USING ARMS: 2 - A LOT
STANDING UP FROM CHAIR USING ARMS: 2 - A LOT
MOVING TO AND FROM BED TO CHAIR: 2 - A LOT
CLIMB 3 TO 5 STEPS WITH RAILING: 1 - TOTAL
WALKING IN HOSPITAL ROOM: 1 - TOTAL
WALKING IN HOSPITAL ROOM: 1 - TOTAL
CLIMB 3 TO 5 STEPS WITH RAILING: 1 - TOTAL
MOVING TO AND FROM BED TO CHAIR: 2 - A LOT

## 2024-10-11 NOTE — PLAN OF CARE
Care Coordination Discharge Plan Note     Discharge Needs Assessment  Concerns to be Addressed: care coordination/care conferences, discharge planning  Current Discharge Risk:      Anticipated Discharge Plan  Anticipated Discharge Disposition: skilled nursing facility, nursing home/detention care       Patient Choice  Offered/Gave Vendor List: yes  Patient's Choice of Community Agency(s):      Patient and/or patient guardian/advocate was made aware of their right to choose a provider. A list of eligible providers was presented and reviewed with the patient and/or patient guardian/advocate in written and/or verbal form. The list delineates providers in the patient’s desired geographic area who are participating in the Medicare program and/or providers contracted with the patient’s primary insurance. The Medicare list and quality ratings were obtained from the Medicare.gov [medicare.gov] website.    ---------------------------------------------------------------------------------------------------------------------    Interdisciplinary Discharge Plan Review:  Participants:physician, nursing, social work/services, family/lay caregiver    Concerns Comments: Spoke to Raymond and discussed discharge plan.  [facility] is first choice due to location.  Spoke to [facility] [phone] who does not have any beds currently as they are holding beds for their residents in the hospital.  She suggested following back up on Monday.  CCSW will continue to follow.    Discharge Plan:   Disposition/Destination: Skilled Nursing Facility - Other  / Skilled Nursing Facility  Discharge Facility:    Community Resources:      Discharge Transportation:  Is Out of Hospital DNR needed at Discharge: no  Does patient need discharge transport? Yes

## 2024-10-11 NOTE — PROGRESS NOTES
Hospital Medicine Service -  Daily Progress Note       SUBJECTIVE   Interval History:   10/11  Patient status post EGD.  Multiple duodenal ulcers, nonbleeding.  Also gastritis seen.  GI discussed with patient's son, and he asked to hold off on colonoscopy since this was a potential answer to the patient's iron deficiency anemia.  GI did discuss with the patient's son that without a colonoscopy, they will not be able to rule out cancer.  Patient's son is okay with this for now.  Met with patient and her son, who is at bedside.  Discussed the findings thus far.  Discussed the plan forward, that the case management is looking at skilled nursing facility, which will likely be available middle of next week.     OBJECTIVE      Vital signs in last 24 hours:  Temp:  [36.6 °C (97.9 °F)-37.3 °C (99.2 °F)] 36.6 °C (97.9 °F)  Heart Rate:  [69-78] 74  Resp:  [17-20] 18  BP: (131-160)/(60-76) 138/64    Intake/Output Summary (Last 24 hours) at 10/11/2024 1602  Last data filed at 10/11/2024 1115  Gross per 24 hour   Intake 100 ml   Output 700 ml   Net -600 ml       PHYSICAL EXAMINATION        Constitutional: Awake, seems alert. No distress.  HEMNT: Mucous membranes moist.  Head: Normocephalic, atraumatic.  Eyes: EOM normal.  Positive pallor seen.  Neck: Supple.   Cardiovascular: Normal rate and regular rhythm. No murmur heard.  Pulmonary/Chest: Effort normal, breath sounds normal. No respiratory distress. No wheezes. No rales.  Abdominal: Soft. No tenderness. No rebound.  Musculoskeletal: Edema has decreased.  Still has tenderness.  Neurological: Awake, seems alert.  Hard of hearing.  Skin: Positive pallor.  Venous stasis dermatitis of the lower extremities bilaterally.     LINES, CATHETERS, DRAINS, AIRWAYS, AND WOUNDS   Lines, Drains, and Airways:  Wounds (agree with documentation and present on admission):  Peripheral IV (Adult) 10/08/24 Anterior;Left;Proximal Forearm (Active)   Number of days: 3       External Urinary  Catheter Female (one size) (Active)   Number of days: 3       Wound Pressure Injury Right Heel (Active)   Number of days: 2       Wound Pressure Injury Left Heel (Active)   Number of days: 2       Wound Abrasion Right Calf (Active)   Number of days: 2       Wound Abrasion Right Pretibial (Active)   Number of days: 2       Wound Blister(s) Proximal;Right Pretibial (Active)   Number of days: 2       Wound Abrasion Anterior;Left Ankle (Active)   Number of days: 2       Wound Blister(s) Left Pretibial (Active)   Number of days: 2            LABS / IMAGING / TELE      Labs  BMP Results         10/11/24 10/09/24 10/08/24     0933 0555 1922     137 136    K 3.9 3.5 3.6    Cl 104 105 104    CO2 24 23 24    Glucose 93 72 109    BUN 15 13 15    Creatinine 0.7 0.6 0.6    Calcium 7.8 7.9 8.5    Anion Gap 7 9 8    EGFR >60.0 >60.0 >60.0           Comment for K at 0555 on 10/09/24: Results obtained on plasma. Plasma Potassium values may be up to 0.4 mEQ/L less than serum values. The differences may be greater for patients with high platelet or white cell counts.    Comment for K at 1922 on 10/08/24: Results obtained on plasma. Plasma Potassium values may be up to 0.4 mEQ/L less than serum values. The differences may be greater for patients with high platelet or white cell counts.    Comment for EGFR at 0933 on 10/11/24: Calculation based on the Chronic Kidney Disease Epidemiology Collaboration (CKD-EPI) equation refit without adjustment for race.    Comment for EGFR at 0555 on 10/09/24: Calculation based on the Chronic Kidney Disease Epidemiology Collaboration (CKD-EPI) equation refit without adjustment for race.    Comment for EGFR at 1922 on 10/08/24: Calculation based on the Chronic Kidney Disease Epidemiology Collaboration (CKD-EPI) equation refit without adjustment for race.            CBC Results         10/11/24 10/10/24 10/09/24     0933 0317 0555    WBC 6.41 5.62 6.35    RBC 2.84 2.75 2.70    HGB 7.9 7.8 7.4    HCT  24.8 23.9 23.2    MCV 87.3 86.9 85.9    MCH 27.8 28.4 27.4    MCHC 31.9 32.6 31.9     383 430              Lab work reviewed by myself      ASSESSMENT AND PLAN      * Toxic encephalopathy  Assessment & Plan  Patient was expressing paranoid thoughts which is unusual for her  Normal UA and normal CT scan of the brain  She is in a new location and is hard of hearing and probably visually impaired as well  These could cause confusion  We will start some PT and OT tomorrow  Zyprexa as needed if she becomes delirious although presently she appears to be calm and oriented    10/9  Hold bethanechol.  Monitor closely.    10/10  Resolved with stopping bethanechol.    PUD (peptic ulcer disease)  Assessment & Plan  10/11  Diagnosed on EGD today.  3 duodenal ulcers, nonbleeding.  Gastritis also seen.  PPI.    Debility  Assessment & Plan  PT OT    Skin ulcer of heel, limited to breakdown of skin (CMS/HCC)  Assessment & Plan  10/10  Present on admission.  Will consult podiatry for help with this case.    10/11  Podiatry help appreciated.  X-ray shows superficial heel ulcer.  NATHALIE completed, await results.    Pain and swelling of left wrist  Assessment & Plan  10/9  Patient states she fell recently.  Check x-ray to rule out fracture.    10/10  X-rays negative for fracture.    Edema  Assessment & Plan  10/9  Probably secondary to Norvasc use.  Check ultrasound rule out DVT.  Start AmLactin, Ace wrap.    10/10  In March 2024, her echocardiogram was normal.  Ultrasound negative for DVT.  Started AmLactin, Ace wrap.  Today, already some improvement of the edema.  Continue.    Iron deficiency anemia  Assessment & Plan  Hemoglobin of 6.7  Her hemoglobin was 11 with her last admission to the hospital, this was in the setting of a pelvic ramus fracture  Normocytic  Mild thrombocytosis  Unclear etiology, she was heme-negative in the emergency room  They had started a blood transfusion before I could put in basic blood work however  they did trial it as she was getting the blood transfusion  Follow-up on haptoglobin, iron level, B12 level  Hematology consultation  1 more stool for Hemoccult  Recheck CBC in the morning after receiving blood transfusion this evening  Empiric PPI in case this is associated GI loss    10/9  Severe iron deficiency anemia noted.  Hematology consulted by admitting team.  Will also consult GI.  Status post 1 unit PRBCs.  Hemoglobin 7.4 thereafter.  Start IV iron.    10/10  Appreciate GI, hematology input.  CT abdomen and pelvis negative.  Possible endoscopic evaluation.  Status post 1 unit PRBCs, hemoglobin is stable thereafter.  Receiving IV iron.    10/11  Status post EGD, showing multiple duodenal ulcers and gastritis.  On PPI.  Patient's son wishes to avoid colonoscopy since a potential answer for the iron deficiency anemia has been ascertained.  GI did warn the son that they would not be able to rule out colon cancer without the colonoscopy.  He is okay with that.  Status post 1 unit PRBCs, hemoglobin stable thereafter.  Continue IV iron while hospitalized, then start oral iron supplementation at the time of discharge.    HTN (hypertension)  Assessment & Plan  Blood pressure is well-controlled continue Norvasc 2.5 mg daily    10/10  According to pharmacist, patient is no longer on any medications for hypertension.  Stopped the Norvasc, as this can lead to significant edema of the lower extremities.    Hypothyroid  Assessment & Plan  TSH 4 months ago was within normal limits  Continue 88 mcg of Synthroid  Recheck TSH in setting of her paranoid delusions    10/9  TSH normal.  Continue current doses of Synthroid.         VTE Assessment: Padua    VTE Prophylaxis:  Current anticoagulants:  enoxaparin (LOVENOX) syringe 40 mg, subcutaneous, Daily (6p)      Code Status: Full Code      Estimated Discharge Date: 10/14/2024   Disposition Planning:   10/11  Iron deficiency anemia.  PUD diagnosed by EGD today.  PPI.  Patient  son does not want her to undergo colonoscopy at this time.  GI did warn the son that they would not be able to rule out cancer.  He is okay with that.  Continue IV iron while hospitalized, then start oral iron supplementation at the time of discharge.    PUD.  Diagnosed this admission.  Start PPI.    Toxic encephalopathy.  DC further bethanechol.  Patient much improved.    Debility.  To skilled nursing facility once bed is available.  Case management looking at availability, probably next week.    Total Time Spent in Patient's Care:  More than 50 minutes were spent at the time of this dictation, which included: reviewing HPI, PMHx, Social Hx, Fhx, VTE prophylaxis, medications, allergies, pertinent diagnostic studies in electronic medical record since this hospitalization; time spent in evaluating the patient and documenting in the medical record. Reviewed plan of care with nursing personnel, GI, case management.  Time included counseling, coordinating care, discussing progress, prognosis and further plan of care with the patient and the son, at bedside.     Lazaro Salcedo MD  10/11/2024

## 2024-10-11 NOTE — ANESTHESIA PREPROCEDURE EVALUATION
Relevant Problems   CARDIOVASCULAR   (+) HTN (hypertension)      HEMATOLOGY   (+) Iron deficiency anemia      Other   (+) Hypothyroid       PMH:   Past Medical History:   Diagnosis Date    Hypertension     Hypothyroidism     Lipid disorder         ROS/Med Hx:  Previous anesthetics, no issues  NPO >8 hours    HTN  Hypothyroid  Encephalopathy  Anemia  Hard of hearing     EKG: SR    TTE 3/2024:   1.  Normal LV size and function with ejection fraction of 65 to 70%.  2.  No regional wall motion abnormalities identified .  3.  Great I diastolic dysfunction .  4.  No significant valvular heart disease seen.         PSH: No past surgical history on file.    Allergies: No Known Allergies    No current facility-administered medications on file prior to encounter.     Current Outpatient Medications on File Prior to Encounter   Medication Sig    bethanechol (URECHOLINE) 10 mg tablet Take 10 mg by mouth 3 (three) times a day.    docusate sodium (COLACE) 100 mg capsule Take 100 mg by mouth daily.    fluticasone propionate (FLONASE) 50 mcg/actuation nasal spray Administer 1 spray into each nostril daily.    levothyroxine (SYNTHROID) 88 mcg tablet Take 88 mcg by mouth daily.    pravastatin (PRAVACHOL) 20 mg tablet Take 20 mg by mouth nightly.    sennosides-docusate sodium (SENOKOT-S) 8.6-50 mg Take 2 tablets by mouth daily as needed for constipation.    traMADoL (ULTRAM) 50 mg tablet Take 25 mg by mouth every 8 (eight) hours as needed for pain.         CBC Results         10/11/24 10/10/24 10/09/24     0933 0317 0555    WBC 6.41 5.62 6.35    RBC 2.84 2.75 2.70    HGB 7.9 7.8 7.4    HCT 24.8 23.9 23.2    MCV 87.3 86.9 85.9    MCH 27.8 28.4 27.4    MCHC 31.9 32.6 31.9     383 430          BMP Results         10/11/24 10/09/24 10/08/24     0933 0555 1922     137 136    K 3.9 3.5 3.6    Cl 104 105 104    CO2 24 23 24    Glucose 93 72 109    BUN 15 13 15    Creatinine 0.7 0.6 0.6    Calcium 7.8 7.9 8.5    Anion Gap 7 9 8     EGFR >60.0 >60.0 >60.0           Comment for K at 0555 on 10/09/24: Results obtained on plasma. Plasma Potassium values may be up to 0.4 mEQ/L less than serum values. The differences may be greater for patients with high platelet or white cell counts.    Comment for K at 1922 on 10/08/24: Results obtained on plasma. Plasma Potassium values may be up to 0.4 mEQ/L less than serum values. The differences may be greater for patients with high platelet or white cell counts.    Comment for EGFR at 0933 on 10/11/24: Calculation based on the Chronic Kidney Disease Epidemiology Collaboration (CKD-EPI) equation refit without adjustment for race.    Comment for EGFR at 0555 on 10/09/24: Calculation based on the Chronic Kidney Disease Epidemiology Collaboration (CKD-EPI) equation refit without adjustment for race.    Comment for EGFR at 1922 on 10/08/24: Calculation based on the Chronic Kidney Disease Epidemiology Collaboration (CKD-EPI) equation refit without adjustment for race.                    Physical Exam    Airway   Mallampati: II   TM distance: >3 FB   Neck ROM: full  Cardiovascular - normal   Rhythm: regular   Rate: normalDental    Teeth Problems: missing        Anesthesia Plan    Plan: MAC       3 ASA  Anesthetic plan and risks discussed with: patient and adult children  Comments:    Plan: Discussed GA-LMA vs ETT if further airway intervention is needed.

## 2024-10-11 NOTE — OP NOTE
_______________________________________________________________________________  Patient Name: Sofie Stewart            Procedure Date: 10/11/2024 11:52 AM  MRN: [id number]                     Account Number: [id number]  YOB: 1931               Age: 89  Gender: Female                        Note Status: Finalized  Attending MD: ERASMO DOLL MD~LAVON,  _______________________________________________________________________________  Procedure:             Upper GI endoscopy  Indications:           Melena  Providers:             ERASMO DOLL MD~LAVON (Doctor)  Referring MD:  Requesting Provider:  Medicines:             Monitored Anesthesia Care  Complications:         No immediate complications.  _______________________________________________________________________________  Procedure:             After obtaining informed consent, the endoscope was  passed under direct vision. Throughout the procedure,  the patient's blood pressure, pulse, and oxygen  saturations were monitored continuously. The Endoscope  was introduced through the mouth, and advanced to the  second part of duodenum. The upper GI endoscopy was  accomplished without difficulty. The patient tolerated  the procedure well.  Estimated Blood Loss:  Estimated blood loss: none.  Findings:  The examined esophagus was normal.  A large hiatal hernia was present.  Mild inflammation was found in the gastric antrum.  Three non-bleeding superficial duodenal ulcers with no stigmata of  bleeding were found in the duodenal bulb and in the second portion of  the duodenum.  Impression:            - Normal esophagus.  - Large hiatal hernia.  - Gastritis.  - Non-bleeding duodenal ulcers with no stigmata of  bleeding.  - No specimens collected.  Recommendation:        - Patient has a contact number available for  emergencies. The signs and symptoms of potential  delayed complications were discussed with the patient.  Return to normal activities  tomorrow. Written  discharge instructions were provided to the patient.  - Resume previous diet today.  - Continue present medications.  - Use Protonix (pantoprazole) 40 mg PO BID for 2  months.  Procedure Code(s):     --- Professional ---  60296, Esophagogastroduodenoscopy, flexible,  transoral; diagnostic, including collection of  specimen(s) by brushing or washing, when performed  (separate procedure)  Diagnosis Code(s):     --- Professional ---  K44.9, Diaphragmatic hernia without obstruction or  gangrene  K29.70, Gastritis, unspecified, without bleeding  K26.9, Duodenal ulcer, unspecified as acute or  chronic, without hemorrhage or perforation  K92.1, Melena (includes Hematochezia)  CPT copyright 2023 American Medical Association. All rights reserved.  The codes documented in this report are preliminary and upon  review may  be revised to meet current compliance requirements.  ________________________  ERASMO DOLL MD~LAVON  10/11/2024 1:53:49 PM  This report has been signed electronically.  Number of Addenda: 0  Note Initiated On: 10/11/2024 11:52 AM

## 2024-10-11 NOTE — PLAN OF CARE
Care Coordination Discharge Plan Note     Discharge Needs Assessment  Concerns to be Addressed: care coordination/care conferences, discharge planning  Current Discharge Risk:      Anticipated Discharge Plan  Anticipated Discharge Disposition: skilled nursing facility, nursing home/snf care       Patient Choice  Offered/Gave Vendor List: yes  Patient's Choice of Community Agency(s):      Patient and/or patient guardian/advocate was made aware of their right to choose a provider. A list of eligible providers was presented and reviewed with the patient and/or patient guardian/advocate in written and/or verbal form. The list delineates providers in the patient’s desired geographic area who are participating in the Medicare program and/or providers contracted with the patient’s primary insurance. The Medicare list and quality ratings were obtained from the Medicare.gov [medicare.gov] website.    ---------------------------------------------------------------------------------------------------------------------    Interdisciplinary Discharge Plan Review:  Participants:physician, nursing, social work/services, family/lay caregiver    Concerns Comments: [facility] and [facility] are not able to accept patient.  Waiting to hear back from Laura Diana and Vernell Guzman.    Discharge Plan:   Disposition/Destination: Skilled Nursing Facility - Other  / Skilled Nursing Facility  Discharge Facility:    Community Resources:      Discharge Transportation:  Is Out of Hospital DNR needed at Discharge: no  Does patient need discharge transport? Yes

## 2024-10-11 NOTE — GI OP NOTE
EGD - three clean based duodenal ulcers, large hh  Plan  - ulcers likely source of melena at home and anemia  - d/w son.  Given above findings he is ok holding off on colonoscopy especially with CT looking ok.  Understands that by no looking something could be missed or delayed.  - advance diet  - protonix 40 mg bid for 2 months  - call if needed

## 2024-10-11 NOTE — PLAN OF CARE
Problem: Adult Inpatient Plan of Care  Goal: Plan of Care Review  Outcome: Progressing  Flowsheets (Taken 10/11/2024 1815)  Outcome Evaluation: pt AAOx3 but forgetful at times. pt [place]. pt went for upper endo and is ordering dinner now. pt refusing repositioning sometimes. pt max to turb. BLE dressings CDI. pt denying pain. bed alarm in place. call bell within reach  Goal: Absence of Hospital-Acquired Illness or Injury  Outcome: Progressing  Goal: Optimal Comfort and Wellbeing  Outcome: Progressing   Plan of Care Review  Outcome Evaluation: pt AAOx3 but forgetful at times. pt [place]. pt went for upper endo and is ordering dinner now. pt refusing repositioning sometimes. pt max to turb. BLE dressings CDI. pt denying pain. bed alarm in place. call bell within reach

## 2024-10-11 NOTE — ANESTHESIA POSTPROCEDURE EVALUATION
Patient: Sofie Stewart    Procedure Summary       Date: 10/11/24 Room / Location:  GI 1 / [facility]    Anesthesia Start: 1341 Anesthesia Stop: 1351    Procedure: ENDOSCOPY, ESOPHAGUS (Esophagus) Diagnosis:       Iron deficiency anemia due to chronic blood loss      (Iron deficiency anemia due to chronic blood loss [D50.0])    Providers: Hubert Fuchs MD Responsible Provider: Ruthann Frias MD    Anesthesia Type: MAC ASA Status: 3            Anesthesia Type: MAC  PACU Vitals  10/11/2024 1351 - 10/11/2024 1429        10/11/2024  1354 10/11/2024  1409 10/11/2024  1424       BP: 131/60 144/61 138/64     Temp: 36.6 °C (97.9 °F) -- --     Pulse: 69 69 74     Resp: 18 18 18     SpO2: 100 % 98 % 97 %               Anesthesia Post Evaluation    Pain management: adequate  Patient participation: complete - patient participated  Level of consciousness: awake and alert  Cardiovascular status: acceptable  Airway Patency: adequate  Respiratory status: acceptable  Hydration status: acceptable  Anesthetic complications: no

## 2024-10-12 PROBLEM — I10 HTN (HYPERTENSION): Status: RESOLVED | Noted: 2024-02-29 | Resolved: 2024-10-12

## 2024-10-12 PROCEDURE — 99231 SBSQ HOSP IP/OBS SF/LOW 25: CPT | Performed by: HOSPITALIST

## 2024-10-12 PROCEDURE — 63700000 HC SELF-ADMINISTRABLE DRUG: Performed by: INTERNAL MEDICINE

## 2024-10-12 PROCEDURE — 63600000 HC DRUGS/DETAIL CODE: Mod: JZ | Performed by: INTERNAL MEDICINE

## 2024-10-12 PROCEDURE — 25800000 HC PHARMACY IV SOLUTIONS: Performed by: INTERNAL MEDICINE

## 2024-10-12 PROCEDURE — 12000000 HC ROOM AND CARE MED/SURG

## 2024-10-12 PROCEDURE — 99232 SBSQ HOSP IP/OBS MODERATE 35: CPT | Performed by: STUDENT IN AN ORGANIZED HEALTH CARE EDUCATION/TRAINING PROGRAM

## 2024-10-12 RX ADMIN — PANTOPRAZOLE SODIUM 40 MG: 40 TABLET, DELAYED RELEASE ORAL at 20:33

## 2024-10-12 RX ADMIN — PRAVASTATIN SODIUM 20 MG: 20 TABLET ORAL at 20:33

## 2024-10-12 RX ADMIN — ENOXAPARIN SODIUM 40 MG: 40 INJECTION SUBCUTANEOUS at 18:23

## 2024-10-12 RX ADMIN — FLUTICASONE PROPIONATE 1 SPRAY: 50 SPRAY, METERED NASAL at 10:29

## 2024-10-12 RX ADMIN — LEVOTHYROXINE SODIUM 88 MCG: 0.09 TABLET ORAL at 05:08

## 2024-10-12 RX ADMIN — PANTOPRAZOLE SODIUM 40 MG: 40 TABLET, DELAYED RELEASE ORAL at 10:28

## 2024-10-12 RX ADMIN — SODIUM CHLORIDE 125 MG: 9 INJECTION, SOLUTION INTRAVENOUS at 10:33

## 2024-10-12 ASSESSMENT — COGNITIVE AND FUNCTIONAL STATUS - GENERAL
MOVING TO AND FROM BED TO CHAIR: 2 - A LOT
WALKING IN HOSPITAL ROOM: 1 - TOTAL
CLIMB 3 TO 5 STEPS WITH RAILING: 1 - TOTAL
STANDING UP FROM CHAIR USING ARMS: 1 - TOTAL

## 2024-10-12 NOTE — PROGRESS NOTES
Hematology/Oncology -  Daily Progress Note       SUBJECTIVE   Interval History: She was seen lying in bed comfortably.  She denies denies any bleeding.  She has no chest pain, shortness of breath. She has hard time hearing.    She had EGD, showing nonbleeding duodenal ulcers yesterday.     OBJECTIVE      Vital signs in last 24 hours:  Temp:  [36.6 °C (97.8 °F)-37.1 °C (98.8 °F)] 37.1 °C (98.8 °F)  Heart Rate:  [65-74] 65  Resp:  [16-20] 17  BP: (131-155)/(60-71) 145/67    Intake/Output Summary (Last 24 hours) at 10/12/2024 0965  Last data filed at 10/12/2024 0630  Gross per 24 hour   Intake 260 ml   Output 1600 ml   Net -1340 ml       PHYSICAL EXAMINATION          Physical Examination:  Physical Exam  Constitutional:       Appearance: She is not ill-appearing.   Cardiovascular:      Rate and Rhythm: Normal rate and regular rhythm.   Pulmonary:      Effort: Pulmonary effort is normal.      Breath sounds: Normal breath sounds.   Abdominal:      Palpations: Abdomen is soft.   Skin:     Coloration: Skin is not jaundiced.   Neurological:      General: No focal deficit present.      Mental Status: She is alert.   Psychiatric:         Mood and Affect: Mood normal.         No data recorded   ammonium lactate   Topical BID    enoxaparin  40 mg subcutaneous Daily (6p)    ferric gluconate (FERRLECIT) 125 mg in sodium chloride 0.9 % 100 mL IVPB  125 mg intravenous Daily    fluticasone propionate  1 spray Each Nostril Daily    levothyroxine  88 mcg oral Daily (6:30a)    pantoprazole  40 mg oral BID    pravastatin  20 mg oral Nightly        LABS / IMAGING / TELE      Labs  Recent Results (from the past 24 hours)   Ultrasound NATHALIE extremity    Collection Time: 10/11/24 11:11 AM   Result Value Ref Range    BSA 1.78 m2    Right arm  mmHg    Right posterior tibial 197 mmHg    Right Post Tibial Index 1.37     Right Dorsalis pedis 186 mmHg    Right Dorsalis pedis Index 1.29     Right NATHALIE 1.37     Left posterior tibial 212 mmHg     Left Post Tibial Index 1.47     Left Dorsalis pedis 191 mmHg    Left Dorsalis pedis Index 1.33     Left NATHALIE 1.47    ]    Imaging  Ultrasound NATHALIE extremity    Result Date: 10/11/2024  Normal arterial perfusion to both ankles        ASSESSMENT AND PLAN      Iron deficiency anemia  Assessment & Plan  She is acute anemia that developed over the last 4 months. (12 g/dl > 6.7 g/dl)  She has normal white blood cell count with a fairly normal differential.  Platelet count is slightly elevated reactive to iron deficiency anemia  She has low iron, iron saturation.  Ferritin not sent.  Folate and B12 levels are normal  CMP shows normal renal function, normal total protein and fairly normal electrolytes.    Will check reticulocyte count and reticulated hemoglobin.  Addendum: Retic hemoglobin low at 21 consistent with iron deficiency anemia.  Reticulocyte count low or hypoproliferative  GI consulted and CT of the abdomen and pelvis ordered.  No acute pathology found.  ?  Endoscopic evaluation  Starting Ferrlecit 125 mg intravenously daily x 8 infusions  Will also send peripheral blood flow cytometry however suspicion for an underlying lymphoproliferative disorder low with findings of iron deficiency anemia.    10/10/24  She tolerates IV iron very well.  Will continue.  GI is on board for possible scope pending discussion with the family members.  She denies any bleeding at this point.  She is not symptomatic.  Okay to transfuse to keep hemoglobin above 8 if she has chest pain or shortness of breath.  Otherwise we can keep HGB above 7.    10/12/24  She had a EGD on October 11, found to have nonbleeding duodenal ulcers.  Her son is not interested in colonoscopy at this point.  As such we will continue IV iron to give her totally 1000 mg iron.  Depending on the timing of her discharge we will arrange the rest as outpatient.  Transfuse to keep hemoglobin above 7.  Will continue to follow along.         VTE Assessment: Pad[place]     Code Status: Full Code  Estimated discharge date: 10/14/2024     Hieu Galarza MD, PhD  10/12/2024  9:51 AM

## 2024-10-12 NOTE — PLAN OF CARE
Plan of Care Review  Outcome Evaluation: Leg dressings changed by podietry. waffle boots in place.Safety maintained. [place], hearing aids charged.Awaiting SNIF placement

## 2024-10-12 NOTE — PLAN OF CARE
Plan of Care Review  Plan of Care Reviewed With: patient  Progress: no change  Outcome Evaluation: Patient is alert and orienated but forgetful.  She is hard of hearing.  Patient turned and repositioned during the night.  Orders received for waffle boots.  Some discomfort while moving her legs but otherwise denies any pain.  Wounds on legs and heels are covered with gauze and ace wrap, CDI.  Purwic in place.  Vital signs stable, call in reach and bed alarm active.

## 2024-10-13 LAB
ANION GAP SERPL CALC-SCNC: 8 MEQ/L (ref 3–15)
BASOPHILS # BLD: 0.03 K/UL (ref 0.01–0.1)
BASOPHILS NFR BLD: 0.4 %
BUN SERPL-MCNC: 16 MG/DL (ref 7–25)
CALCIUM SERPL-MCNC: 8.1 MG/DL (ref 8.6–10.3)
CHLORIDE SERPL-SCNC: 100 MEQ/L (ref 98–107)
CO2 SERPL-SCNC: 26 MEQ/L (ref 21–31)
CREAT SERPL-MCNC: 0.6 MG/DL (ref 0.6–1.2)
DIFFERENTIAL METHOD BLD: ABNORMAL
EGFRCR SERPLBLD CKD-EPI 2021: >60 ML/MIN/1.73M*2
EOSINOPHIL # BLD: 0.15 K/UL (ref 0.04–0.36)
EOSINOPHIL NFR BLD: 1.9 %
ERYTHROCYTE [DISTWIDTH] IN BLOOD BY AUTOMATED COUNT: 16.6 % (ref 11.7–14.4)
GLUCOSE SERPL-MCNC: 97 MG/DL (ref 70–99)
HCT VFR BLD AUTO: 29.2 % (ref 35–45)
HGB BLD-MCNC: 9.3 G/DL (ref 11.8–15.7)
IMM GRANULOCYTES # BLD AUTO: 0.11 K/UL (ref 0–0.08)
IMM GRANULOCYTES NFR BLD AUTO: 1.4 %
LYMPHOCYTES # BLD: 1.06 K/UL (ref 1.2–3.5)
LYMPHOCYTES NFR BLD: 13.5 %
MAGNESIUM SERPL-MCNC: 1.7 MG/DL (ref 1.8–2.5)
MCH RBC QN AUTO: 28 PG (ref 28–33.2)
MCHC RBC AUTO-ENTMCNC: 31.8 G/DL (ref 32.2–35.5)
MCV RBC AUTO: 88 FL (ref 83–98)
MONOCYTES # BLD: 0.83 K/UL (ref 0.28–0.8)
MONOCYTES NFR BLD: 10.5 %
NEUTROPHILS # BLD: 5.69 K/UL (ref 1.7–7)
NEUTS SEG NFR BLD: 72.3 %
NRBC BLD-RTO: 0 %
PLATELET # BLD AUTO: 314 K/UL (ref 150–369)
PMV BLD AUTO: 9.5 FL (ref 9.4–12.3)
POTASSIUM SERPL-SCNC: 3.9 MEQ/L (ref 3.5–5.1)
RBC # BLD AUTO: 3.32 M/UL (ref 3.93–5.22)
SODIUM SERPL-SCNC: 134 MEQ/L (ref 136–145)
WBC # BLD AUTO: 7.87 K/UL (ref 3.8–10.5)

## 2024-10-13 PROCEDURE — 25800000 HC PHARMACY IV SOLUTIONS: Performed by: INTERNAL MEDICINE

## 2024-10-13 PROCEDURE — 63700000 HC SELF-ADMINISTRABLE DRUG: Performed by: INTERNAL MEDICINE

## 2024-10-13 PROCEDURE — 63600000 HC DRUGS/DETAIL CODE: Mod: JZ | Performed by: INTERNAL MEDICINE

## 2024-10-13 PROCEDURE — 63700000 HC SELF-ADMINISTRABLE DRUG: Performed by: HOSPITALIST

## 2024-10-13 PROCEDURE — 12000000 HC ROOM AND CARE MED/SURG

## 2024-10-13 PROCEDURE — 63600000 HC DRUGS/DETAIL CODE: Mod: JZ | Performed by: HOSPITALIST

## 2024-10-13 PROCEDURE — 85025 COMPLETE CBC W/AUTO DIFF WBC: CPT | Performed by: STUDENT IN AN ORGANIZED HEALTH CARE EDUCATION/TRAINING PROGRAM

## 2024-10-13 PROCEDURE — 83735 ASSAY OF MAGNESIUM: CPT | Performed by: STUDENT IN AN ORGANIZED HEALTH CARE EDUCATION/TRAINING PROGRAM

## 2024-10-13 PROCEDURE — 80048 BASIC METABOLIC PNL TOTAL CA: CPT | Performed by: STUDENT IN AN ORGANIZED HEALTH CARE EDUCATION/TRAINING PROGRAM

## 2024-10-13 PROCEDURE — 36415 COLL VENOUS BLD VENIPUNCTURE: CPT | Performed by: STUDENT IN AN ORGANIZED HEALTH CARE EDUCATION/TRAINING PROGRAM

## 2024-10-13 PROCEDURE — 99232 SBSQ HOSP IP/OBS MODERATE 35: CPT | Performed by: HOSPITALIST

## 2024-10-13 RX ORDER — POLYETHYLENE GLYCOL 3350 17 G/17G
17 POWDER, FOR SOLUTION ORAL DAILY
Status: DISCONTINUED | OUTPATIENT
Start: 2024-10-13 | End: 2024-10-16 | Stop reason: HOSPADM

## 2024-10-13 RX ORDER — POTASSIUM CHLORIDE 20 MEQ/1
20 TABLET, EXTENDED RELEASE ORAL AS NEEDED
Status: DISCONTINUED | OUTPATIENT
Start: 2024-10-13 | End: 2024-10-16 | Stop reason: HOSPADM

## 2024-10-13 RX ORDER — IBUPROFEN/PSEUDOEPHEDRINE HCL 200MG-30MG
6 TABLET ORAL NIGHTLY
Status: DISCONTINUED | OUTPATIENT
Start: 2024-10-13 | End: 2024-10-16 | Stop reason: HOSPADM

## 2024-10-13 RX ORDER — POTASSIUM CHLORIDE 20 MEQ/1
40 TABLET, EXTENDED RELEASE ORAL AS NEEDED
Status: DISCONTINUED | OUTPATIENT
Start: 2024-10-13 | End: 2024-10-16 | Stop reason: HOSPADM

## 2024-10-13 RX ADMIN — ENOXAPARIN SODIUM 40 MG: 40 INJECTION SUBCUTANEOUS at 17:38

## 2024-10-13 RX ADMIN — LEVOTHYROXINE SODIUM 88 MCG: 0.09 TABLET ORAL at 06:19

## 2024-10-13 RX ADMIN — SODIUM CHLORIDE 125 MG: 9 INJECTION, SOLUTION INTRAVENOUS at 08:47

## 2024-10-13 RX ADMIN — PANTOPRAZOLE SODIUM 40 MG: 40 TABLET, DELAYED RELEASE ORAL at 22:33

## 2024-10-13 RX ADMIN — PRAVASTATIN SODIUM 20 MG: 20 TABLET ORAL at 22:33

## 2024-10-13 RX ADMIN — Medication 6 MG: at 22:34

## 2024-10-13 RX ADMIN — MAGNESIUM SULFATE HEPTAHYDRATE 2 G: 40 INJECTION, SOLUTION INTRAVENOUS at 13:33

## 2024-10-13 RX ADMIN — POLYETHYLENE GLYCOL 3350 17 G: 17 POWDER, FOR SOLUTION ORAL at 13:32

## 2024-10-13 RX ADMIN — PANTOPRAZOLE SODIUM 40 MG: 40 TABLET, DELAYED RELEASE ORAL at 08:46

## 2024-10-13 ASSESSMENT — COGNITIVE AND FUNCTIONAL STATUS - GENERAL
WALKING IN HOSPITAL ROOM: 1 - TOTAL
MOVING TO AND FROM BED TO CHAIR: 1 - TOTAL
CLIMB 3 TO 5 STEPS WITH RAILING: 1 - TOTAL
STANDING UP FROM CHAIR USING ARMS: 1 - TOTAL
WALKING IN HOSPITAL ROOM: 1 - TOTAL
STANDING UP FROM CHAIR USING ARMS: 2 - A LOT
MOVING TO AND FROM BED TO CHAIR: 2 - A LOT
CLIMB 3 TO 5 STEPS WITH RAILING: 1 - TOTAL

## 2024-10-13 NOTE — ASSESSMENT & PLAN NOTE
- Patient was expressing paranoid thoughts which is unusual for her  - Normal UA and normal CT scan of the brain  - TSH, vitamin B12, folate normal  - Dc'd bethanechol   - Improving  - Likely has underlying dementia which became more prominent with change in location

## 2024-10-13 NOTE — PROGRESS NOTES
Hospital Medicine Service -  Daily Progress Note       SUBJECTIVE   Interval History:   Sofie Stewart was seen and examined AM   no acute overnight event reported  patient reported no chest pain, sob, N/V/D, overt bleeding.    AAOx3, calm, NAD, in bed, no new co    DCP: Medically stable for DC, SW is working on SNF      OBJECTIVE      Vital signs in last 24 hours:  Vitals:    10/13/24 0830   BP: 138/64   Pulse: 70   Resp: 17   Temp: 36.7 °C (98.1 °F)   SpO2: 92%       Intake/Output Summary (Last 24 hours) at 10/13/2024 1151  Last data filed at 10/13/2024 0415  Gross per 24 hour   Intake 120 ml   Output 1000 ml   Net -880 ml     PHYSICAL EXAMINATION      General Appearance:  Awake, Alert, no distress     Head:    Normocephalic, without obvious abnormality, atraumatic   Neck: Supple      Lungs:   Bilateral equal expansion  No labored breathing     Heart:  S1 and S2 normal  no rub or gallop     Abdomen:   Soft  no masses  no organomegaly     Vascular: Bilateral radial pulse equally palpated      Extremities: no calf tenderness      Behavior/Emotional: Mood stable      Skin:                                     no rash     Neuro:                                 Spontaneous move bilateral upper and lower extremity                                                No focal deficits   LINES, CATHETERS, DRAINS, AIRWAYS, AND WOUNDS   Lines, Drains, Airways, Wounds:  Peripheral IV (Adult) 10/08/24 Anterior;Left;Proximal Forearm (Active)   Number of days: 5       Wound Pressure Injury Right Heel (Active)   Number of days: 4       Wound Pressure Injury Left Heel (Active)   Number of days: 4       Wound Abrasion Right Calf (Active)   Number of days: 4       Wound Abrasion Right Pretibial (Active)   Number of days: 4       Wound Blister(s) Proximal;Right Pretibial (Active)   Number of days: 4       Wound Abrasion Anterior;Left Ankle (Active)   Number of days: 4       Wound Blister(s) Left Pretibial (Active)   Number of  days: 4       Comments:      LABS / IMAGING / TELE      Labs  CMP Results         10/13/24 10/11/24 10/09/24     0400 0933 0555     135 137    K 3.9 3.9 3.5    Cl 100 104 105    CO2 26 24 23    Glucose 97 93 72    BUN 16 15 13    Creatinine 0.6 0.7 0.6    Calcium 8.1 7.8 7.9    Anion Gap 8 7 9    EGFR >60.0 >60.0 >60.0           Comment for K at 0555 on 10/09/24: Results obtained on plasma. Plasma Potassium values may be up to 0.4 mEQ/L less than serum values. The differences may be greater for patients with high platelet or white cell counts.    Comment for EGFR at 0400 on 10/13/24: Calculation based on the Chronic Kidney Disease Epidemiology Collaboration (CKD-EPI) equation refit without adjustment for race.    Comment for EGFR at 0933 on 10/11/24: Calculation based on the Chronic Kidney Disease Epidemiology Collaboration (CKD-EPI) equation refit without adjustment for race.    Comment for EGFR at 0555 on 10/09/24: Calculation based on the Chronic Kidney Disease Epidemiology Collaboration (CKD-EPI) equation refit without adjustment for race.          CBC Results         10/13/24 10/11/24 10/10/24     0400 0933 0317    WBC 7.87 6.41 5.62    RBC 3.32 2.84 2.75    HGB 9.3 7.9 7.8    HCT 29.2 24.8 23.9    MCV 88.0 87.3 86.9    MCH 28.0 27.8 28.4    MCHC 31.8 31.9 32.6     327 383          Troponin I Results         10/08/24 10/08/24 02/29/24     2220 2029 1755    HS Troponin I 17.0 17.8 13.7          PT/PTT Results         02/29/24     1600    PT 12.1    INR 0.9          Lab Results   Component Value Date     (H) 10/08/2024    TSH 1.56 10/08/2024     Imaging  X-RAY FOOT LEFT 3+ VIEWS    Result Date: 10/11/2024  IMPRESSION: Superficial plantar heel wound.  No radiographic evidence for osteomyelitis.     CT ABDOMEN PELVIS WITH IV CONTRAST    Result Date: 10/9/2024  IMPRESSION: 1. No acute inflammatory process in the abdomen or pelvis. 2. Small bilateral pleural effusions with associated atelectasis  3. Moderate sized hiatal hernia     Ultrasound venous leg bilateral    Result Date: 10/9/2024  IMPRESSION: No deep venous thrombosis in the lower extremities. Subcutaneous edema in the calves.    X-RAY WRIST LEFT 2 VIEWS    Result Date: 10/9/2024  IMPRESSION: See comment.    CT HEAD WITHOUT IV CONTRAST    Result Date: 10/8/2024  IMPRESSION: 1. There is no acute intracranial abnormality.  No intracranial hemorrhage or other evidence for traumatic injury. 2. Moderate microvascular ischemic white matter changes are present with moderate global cerebral volume loss.   ECG/Telemetry  ASSESSMENT AND PLAN        PUD (peptic ulcer disease)  Assessment & Plan  - S/p EGD showed 3 duodenal ulcers, nonbleeding.  Gastritis also seen.  - Continue PPI    Debility  Assessment & Plan  - PT/OT rec SNF    Skin ulcer of heel, limited to breakdown of skin (CMS/HCC)  Assessment & Plan  - Present on admission.  - Pod on board, appreciate recs  - Xrays with superficial heel ulcer  - NATHALIE US showed Normal arterial perfusion to both ankles     Pain and swelling of left wrist  Assessment & Plan  - Patient reported recent fall  - Xrays neg for fracture      Edema  Assessment & Plan  - Likley secondary to Norvasc use, dc'd  - LE US this admission neg for DVT  - Continue AmLactin, Ace wrap.      Iron deficiency anemia  Assessment & Plan  - Hemoglobin of 6.7 on admission > s/p 1 unit PRBC, hgb 7.4 thereafter  - Hgb 7.9 today  - GI and heme/onc on board  - S/p EGD yesterday showing multiple duodenal ulcers and gastritis.  On PPI.  - Patient's son wishes to avoid colonoscopy since a potential answer for the iron deficiency anemia has been ascertained.  GI did warn the son that they would not be able to rule out colon cancer without the colonoscopy.  He is okay with that.  - Continue IV iron while hospitalized, then start oral iron supplementation at the time of discharge.    Hypothyroid  Assessment & Plan  - Continue synthroid    * Toxic  encephalopathy  Assessment & Plan  - Patient was expressing paranoid thoughts which is unusual for her  - Normal UA and normal CT scan of the brain  - Dc'd bethanechol   - Improving      Disposition Planning: Pending progression     VTE Assessment: Padua    VTE Prophylaxis Plan: Continue current DVT Prophylaxis   Code Status: Full Code  Estimated Discharge Date: 10/14/2024    This patient note has been dictated using speech recognition software. Inadvertent speech recognition errors should be disregarded. Please do not hesitate to call [facility] office for clarifications.     Traci Trejo MD  10/13/2024

## 2024-10-13 NOTE — PROGRESS NOTES
[facility] Medicine     Daily Progress Note       SUBJECTIVE   Interval History: Patient seen and examined at bedside. Able to tell me the results of the EGD from yesterday. Denies pain, nausea, or additional complaints.      OBJECTIVE      Vital signs in last 24 hours:  Temp:  [36.7 °C (98.1 °F)-37.1 °C (98.8 °F)] 36.7 °C (98.1 °F)  Heart Rate:  [65-72] 72  Resp:  [16-17] 16  BP: (141-156)/(67-73) 141/73    Intake/Output Summary (Last 24 hours) at 10/12/2024 6057  Last data filed at 10/12/2024 1845  Gross per 24 hour   Intake 120 ml   Output 1000 ml   Net -880 ml       PHYSICAL EXAMINATION      Physical Exam  Constitutional:       General: She is not in acute distress.  HENT:      Head: Normocephalic and atraumatic.      Nose: Nose normal.      Mouth/Throat:      Mouth: Mucous membranes are moist.   Eyes:      Extraocular Movements: Extraocular movements intact.      Pupils: Pupils are equal, round, and reactive to light.   Cardiovascular:      Rate and Rhythm: Normal rate and regular rhythm.      Heart sounds: Normal heart sounds.   Pulmonary:      Breath sounds: Normal breath sounds.   Abdominal:      General: Bowel sounds are normal.      Palpations: Abdomen is soft.      Tenderness: There is no abdominal tenderness.   Skin:     General: Skin is warm and dry.   Neurological:      General: No focal deficit present.      Mental Status: She is alert.      Comments: AAOx2 (person and place), [place]            LINES, CATHETERS, DRAINS, AIRWAYS, AND WOUNDS   Lines, Drains, and Airways:  Wounds (agree with documentation and present on admission):  Peripheral IV (Adult) 10/08/24 Anterior;Left;Proximal Forearm (Active)   Number of days: 4       External Urinary Catheter Female (one size) (Active)   Number of days: 4       Wound Pressure Injury Right Heel (Active)   Number of days: 3       Wound Pressure Injury Left Heel (Active)   Number of days: 3       Wound Abrasion Right Calf (Active)   Number of days: 3       Wound  Abrasion Right Pretibial (Active)   Number of days: 3       Wound Blister(s) Proximal;Right Pretibial (Active)   Number of days: 3       Wound Abrasion Anterior;Left Ankle (Active)   Number of days: 3       Wound Blister(s) Left Pretibial (Active)   Number of days: 3         Comments:    LABS / IMAGING / TELE      Labs  Lab Results   Component Value Date    WBC 6.41 10/11/2024    HGB 7.9 (L) 10/11/2024    HCT 24.8 (L) 10/11/2024    MCV 87.3 10/11/2024     10/11/2024     Lab Results   Component Value Date    GLUCOSE 93 10/11/2024    CALCIUM 7.8 (L) 10/11/2024     (L) 10/11/2024    K 3.9 10/11/2024    CO2 24 10/11/2024     10/11/2024    BUN 15 10/11/2024    CREATININE 0.7 10/11/2024           Imaging  Ultrasound NATHALIE extremity   Final Result      X-RAY FOOT LEFT 3+ VIEWS   Final Result   IMPRESSION:   Superficial plantar heel wound.  No radiographic evidence for osteomyelitis.         CT ABDOMEN PELVIS WITH IV CONTRAST   Final Result   IMPRESSION:   1. No acute inflammatory process in the abdomen or pelvis.      2. Small bilateral pleural effusions with associated atelectasis      3. Moderate sized hiatal hernia               X-RAY WRIST LEFT 2 VIEWS   Final Result   IMPRESSION: See comment.      Ultrasound venous leg bilateral   Final Result   IMPRESSION:   No deep venous thrombosis in the lower extremities. Subcutaneous edema in the   calves.      CT HEAD WITHOUT IV CONTRAST   Final Result   IMPRESSION:   1. There is no acute intracranial abnormality.  No intracranial hemorrhage or   other evidence for traumatic injury.   2. Moderate microvascular ischemic white matter changes are present with   moderate global cerebral volume loss.      ECG 12 lead   Final Result            ASSESSMENT AND PLAN      PUD (peptic ulcer disease)  Assessment & Plan  - S/p EGD showed 3 duodenal ulcers, nonbleeding.  Gastritis also seen.  - Continue PPI    Debility  Assessment & Plan  - PT/OT rec SNF    Skin ulcer of  heel, limited to breakdown of skin (CMS/HCC)  Assessment & Plan  - Present on admission.  - Pod on board, appreciate recs  - Xrays with superficial heel ulcer  - NATHALIE US showed Normal arterial perfusion to both ankles     Pain and swelling of left wrist  Assessment & Plan  - Patient reported recent fall  - Xrays neg for fracture      Edema  Assessment & Plan  - Likley secondary to Norvasc use, dc'd  - LE US this admission neg for DVT  - Continue AmLactin, Ace wrap.      Iron deficiency anemia  Assessment & Plan  - Hemoglobin of 6.7 on admission > s/p 1 unit PRBC, hgb 7.4 thereafter  - Hgb 7.9 today  - GI and heme/onc on board  - S/p EGD yesterday showing multiple duodenal ulcers and gastritis.  On PPI.  - Patient's son wishes to avoid colonoscopy since a potential answer for the iron deficiency anemia has been ascertained.  GI did warn the son that they would not be able to rule out colon cancer without the colonoscopy.  He is okay with that.  - Continue IV iron while hospitalized, then start oral iron supplementation at the time of discharge.    Hypothyroid  Assessment & Plan  - Continue synthroid    * Toxic encephalopathy  Assessment & Plan  - Patient was expressing paranoid thoughts which is unusual for her  - Normal UA and normal CT scan of the brain  - Dc'd bethanechol   - Improving          VTE Assessment: Padua    VTE Prophylaxis:  Current anticoagulants:  enoxaparin (LOVENOX) syringe 40 mg, subcutaneous, Daily (6p)      Code Status: Full Code      Estimated Discharge Date: 10/14/2024   Disposition Planning: Pending placement     Liana Parisi,   10/12/2024

## 2024-10-13 NOTE — ASSESSMENT & PLAN NOTE
- Hemoglobin of 6.7 on admission > s/p 1 unit PRBC, hgb 7.4 thereafter  - GI and heme/onc on board  - S/p EGD 10/11 showing multiple duodenal ulcers and gastritis. On PPI.  - Patient's son wishes to avoid colonoscopy since a potential answer for the iron deficiency anemia has been ascertained. GI did warn the son that they would not be able to rule out colon cancer without the colonoscopy. He is okay with that.  - Continue IV iron while hospitalized, then start oral iron supplementation at the time of discharge.

## 2024-10-13 NOTE — ASSESSMENT & PLAN NOTE
- Present on admission  - Podiatry on board, appreciate recs  - Xrays with superficial heel ulcer  - NATHALIE US showed normal arterial perfusion to both ankles

## 2024-10-13 NOTE — PLAN OF CARE
Problem: Adult Inpatient Plan of Care  Goal: Plan of Care Review  Outcome: Progressing  Flowsheets (Taken 10/13/2024 1851)  Progress: no change  Outcome Evaluation: repositioning for comfort. denies pain. podiatry to do dresing change of b/l legs in next day or 2. dressings c/d/i and waffle boots in place. patient tolerating diet, voiding. no BM yet but passing flatus.  Plan of Care Reviewed With: patient   Plan of Care Review  Plan of Care Reviewed With: patient  Progress: no change  Outcome Evaluation: repositioning for comfort. denies pain. podiatry to do dresing change of b/l legs in next day or 2. dressings c/d/i and waffle boots in place. patient tolerating diet, voiding. no BM yet but passing flatus.

## 2024-10-14 LAB
ANION GAP SERPL CALC-SCNC: 5 MEQ/L (ref 3–15)
BUN SERPL-MCNC: 18 MG/DL (ref 7–25)
CALCIUM SERPL-MCNC: 7.8 MG/DL (ref 8.6–10.3)
CHLORIDE SERPL-SCNC: 99 MEQ/L (ref 98–107)
CO2 SERPL-SCNC: 26 MEQ/L (ref 21–31)
CREAT SERPL-MCNC: 0.6 MG/DL (ref 0.6–1.2)
EGFRCR SERPLBLD CKD-EPI 2021: >60 ML/MIN/1.73M*2
ERYTHROCYTE [DISTWIDTH] IN BLOOD BY AUTOMATED COUNT: 17.3 % (ref 11.7–14.4)
GLUCOSE SERPL-MCNC: 97 MG/DL (ref 70–99)
HCT VFR BLD AUTO: 26.6 % (ref 35–45)
HGB BLD-MCNC: 8.5 G/DL (ref 11.8–15.7)
MAGNESIUM SERPL-MCNC: 2.1 MG/DL (ref 1.8–2.5)
MCH RBC QN AUTO: 28.1 PG (ref 28–33.2)
MCHC RBC AUTO-ENTMCNC: 32 G/DL (ref 32.2–35.5)
MCV RBC AUTO: 87.8 FL (ref 83–98)
PHOSPHATE SERPL-MCNC: 2.8 MG/DL (ref 2.4–4.7)
PLATELET # BLD AUTO: 281 K/UL (ref 150–369)
PMV BLD AUTO: 9.5 FL (ref 9.4–12.3)
POTASSIUM SERPL-SCNC: 4.2 MEQ/L (ref 3.5–5.1)
RBC # BLD AUTO: 3.03 M/UL (ref 3.93–5.22)
SODIUM SERPL-SCNC: 130 MEQ/L (ref 136–145)
WBC # BLD AUTO: 5.95 K/UL (ref 3.8–10.5)

## 2024-10-14 PROCEDURE — 12000000 HC ROOM AND CARE MED/SURG

## 2024-10-14 PROCEDURE — 63700000 HC SELF-ADMINISTRABLE DRUG: Performed by: HOSPITALIST

## 2024-10-14 PROCEDURE — 63600000 HC DRUGS/DETAIL CODE: Mod: JZ | Performed by: INTERNAL MEDICINE

## 2024-10-14 PROCEDURE — 63700000 HC SELF-ADMINISTRABLE DRUG: Performed by: INTERNAL MEDICINE

## 2024-10-14 PROCEDURE — 99233 SBSQ HOSP IP/OBS HIGH 50: CPT | Performed by: FAMILY MEDICINE

## 2024-10-14 PROCEDURE — 36415 COLL VENOUS BLD VENIPUNCTURE: CPT | Performed by: HOSPITALIST

## 2024-10-14 PROCEDURE — 80048 BASIC METABOLIC PNL TOTAL CA: CPT | Performed by: HOSPITALIST

## 2024-10-14 PROCEDURE — 83735 ASSAY OF MAGNESIUM: CPT | Performed by: HOSPITALIST

## 2024-10-14 PROCEDURE — 25800000 HC PHARMACY IV SOLUTIONS: Performed by: INTERNAL MEDICINE

## 2024-10-14 PROCEDURE — 85027 COMPLETE CBC AUTOMATED: CPT | Performed by: HOSPITALIST

## 2024-10-14 PROCEDURE — 99231 SBSQ HOSP IP/OBS SF/LOW 25: CPT | Performed by: INTERNAL MEDICINE

## 2024-10-14 PROCEDURE — 84100 ASSAY OF PHOSPHORUS: CPT | Performed by: HOSPITALIST

## 2024-10-14 RX ADMIN — Medication 6 MG: at 20:52

## 2024-10-14 RX ADMIN — SODIUM CHLORIDE 125 MG: 9 INJECTION, SOLUTION INTRAVENOUS at 09:17

## 2024-10-14 RX ADMIN — LEVOTHYROXINE SODIUM 88 MCG: 0.09 TABLET ORAL at 05:56

## 2024-10-14 RX ADMIN — PANTOPRAZOLE SODIUM 40 MG: 40 TABLET, DELAYED RELEASE ORAL at 20:52

## 2024-10-14 RX ADMIN — POLYETHYLENE GLYCOL 3350 17 G: 17 POWDER, FOR SOLUTION ORAL at 09:17

## 2024-10-14 RX ADMIN — PRAVASTATIN SODIUM 20 MG: 20 TABLET ORAL at 20:52

## 2024-10-14 RX ADMIN — ENOXAPARIN SODIUM 40 MG: 40 INJECTION SUBCUTANEOUS at 18:31

## 2024-10-14 RX ADMIN — PANTOPRAZOLE SODIUM 40 MG: 40 TABLET, DELAYED RELEASE ORAL at 09:17

## 2024-10-14 RX ADMIN — FLUTICASONE PROPIONATE 1 SPRAY: 50 SPRAY, METERED NASAL at 09:16

## 2024-10-14 ASSESSMENT — COGNITIVE AND FUNCTIONAL STATUS - GENERAL
STANDING UP FROM CHAIR USING ARMS: 2 - A LOT
STANDING UP FROM CHAIR USING ARMS: 2 - A LOT
WALKING IN HOSPITAL ROOM: 1 - TOTAL
MOVING TO AND FROM BED TO CHAIR: 2 - A LOT
WALKING IN HOSPITAL ROOM: 1 - TOTAL
CLIMB 3 TO 5 STEPS WITH RAILING: 1 - TOTAL
MOVING TO AND FROM BED TO CHAIR: 2 - A LOT
CLIMB 3 TO 5 STEPS WITH RAILING: 1 - TOTAL

## 2024-10-14 NOTE — PROGRESS NOTES
Subjective:   Patient seen at bedside for continued management of bilateral lower extremity venous stasis dermatitis with BLE wounds and left heel ulcer. NAD. No overnight events. Dressing clean, dry, and intact. Denies any N/V/F/C/CP/SOB.     ROS:   Past Medical/Surgical history, Allergies, Meds reviewed in detail as charted  FH/SH reviewed in detail as charted  Review of Systems:  Head and Neck: No complaints  Chest : No complaints  Abdomen: No Complaints  Constitutional: Unremarkable     Vitals: I have reviewed the patient's current vital signs as documented in the patient's EMR.    Radiology:   ULTRASOUND NATHALIE EXTREMITY  Resting Study Conclusions:  Right resting NATHALIE of 1.37 is normal.   Left resting NATHALIE of 1.47 is normal.   Comments:    Ankle pulse volume recording waveforms have normal amplitude and contour.     Labs:   CBC Results         10/14/24 10/13/24 10/11/24     0540 0400 0933    WBC 5.95 7.87 6.41    RBC 3.03 3.32 2.84    HGB 8.5 9.3 7.9    HCT 26.6 29.2 24.8    MCV 87.8 88.0 87.3    MCH 28.1 28.0 27.8    MCHC 32.0 31.8 31.9     314 327             Objective:   -Vascular: DP pulses are 1/4 palpable B/L. PT pulses are faintly palpable B/L.   Mild B/L edema to the foot, ankle and leg. Capillary refill time is less than 3 seconds B/L.   Skin temperature is warm to warm from leg to toes B/L.   -Ortho: + active df/pf of all digits B/L. Ankle ROM slightly decreased B/L. MMT slightly decreased in all planes tested B/L.  Pain on palpation over the plantar left heel  Foot deformities  Equinus noted B/L.   No pain noted in the posterior calf upon palpation.   -Neuro: Light touch and protective sensation is intact B/L    -Derm:   BLE: Chronic venous stasis changes with severe xerosis  RLE:  -Superficial granular wound measuring approximately 2cm x 1 cm x 0.1 cm to the medial calf.  Mild drainage, no fluctuance, crepitus, or malodor noted.  -4 superficial granular wounds noted to the anterior right shin each  measuring approximately 1 cm x 0.5 cm x 0.1 cm.  No CSI.    -Very early stages of DTI to plantar heel with surrounding xerotic skin. No further break down.    LLE:  -Full-thickness fibrogranular wound noted to plantar heel measuring approximately 0.9 cm x 0.5 cm x 0.3 cm.  Wound does not probe deep.  moderate serous drainage noted.  No fluctuance, crepitus, malodor noted.  Wound edges are hyperkeratotic.  -Superficial granular wound noted to posterior leg measuring approximately 4 cm x 4 cm x 0.1 cm.  No CSI.      Assessment:  Sofie Stewart is a 89 y.o. female presents with bilateral lower extremity venous stasis dermatitis with BLE wounds and left heel ulcer    Plan:  -Patient was evaluated and treated with all questions and concerns addressed  -Patient was dressed with alginate and abd to the left heel, Mepilex to right heel, and Xeroform with 4 x 4 gauze and Rosa to bilateral legs.  Amlactin applied to legs.   Bilateral legs wrapped with Ace wrap for compression. Dressings will be changed while in house freq: Every 2 to 3 days  -Patient may weight bear to BLE.   -Labs reviewed: See above  -NATHALIE/PVR.  See above, right NATHALIE 1.37, left NATHALIE 1.47  -DVT prophylaxsis and pain control per primary team.  -Rest of care per primary team  -Please contact on call podiatry resident with any questions or concerns.    Michelle Matthew DPM  PGY-2 #[id number]

## 2024-10-14 NOTE — PROGRESS NOTES
Hospital Medicine     Daily Progress Note       SUBJECTIVE   Interval History: Pt is hard of hearing. She is no acute distress. She is alert and oriented x3.      OBJECTIVE      Vital signs in last 24 hours:  Temp:  [36.8 °C (98.3 °F)-37 °C (98.6 °F)] 36.9 °C (98.4 °F)  Heart Rate:  [65-75] 65  Resp:  [19-20] 19  BP: (134-168)/(63-71) 134/64    Intake/Output Summary (Last 24 hours) at 10/14/2024 1735  Last data filed at 10/14/2024 1315  Gross per 24 hour   Intake 1080 ml   Output 1000 ml   Net 80 ml       PHYSICAL EXAMINATION      Physical Exam  Constitutional:       General: She is not in acute distress.  HENT:      Head: Normocephalic.      Nose: Nose normal.      Mouth/Throat:      Mouth: Mucous membranes are moist.   Eyes:      General: No scleral icterus.  Cardiovascular:      Heart sounds: Normal heart sounds.   Pulmonary:      Breath sounds: Normal breath sounds.   Abdominal:      General: There is no distension.      Palpations: Abdomen is soft.   Musculoskeletal:         General: No swelling.   Skin:     Coloration: Skin is not jaundiced.   Neurological:      Mental Status: She is alert. Mental status is at baseline.            LINES, CATHETERS, DRAINS, AIRWAYS, AND WOUNDS   Lines, Drains, and Airways:  Wounds (agree with documentation and present on admission):  Peripheral IV (Adult) 10/13/24 Anterior;Right Forearm (Active)   Number of days: 1       Wound Pressure Injury Right Heel (Active)   Number of days: 5       Wound Pressure Injury Left Heel (Active)   Number of days: 5       Wound Abrasion Right Calf (Active)   Number of days: 5       Wound Abrasion Right Pretibial (Active)   Number of days: 5       Wound Blister(s) Proximal;Right Pretibial (Active)   Number of days: 5       Wound Abrasion Anterior;Left Ankle (Active)   Number of days: 5       Wound Blister(s) Left Pretibial (Active)   Number of days: 5         Comments:    LABS / IMAGING / TELE      Labs  Lab Results   Component Value Date     WBC 5.95 10/14/2024    HGB 8.5 (L) 10/14/2024    HCT 26.6 (L) 10/14/2024    MCV 87.8 10/14/2024     10/14/2024     Lab Results   Component Value Date    GLUCOSE 97 10/14/2024    CALCIUM 7.8 (L) 10/14/2024     (L) 10/14/2024    K 4.2 10/14/2024    CO2 26 10/14/2024    CL 99 10/14/2024    BUN 18 10/14/2024    CREATININE 0.6 10/14/2024       ASSESSMENT AND PLAN      * Toxic encephalopathy  Assessment & Plan  - Patient was expressing paranoid thoughts which is unusual for her  - Normal UA and normal CT scan of the brain  - TSH, vitamin B12, folate normal  - Dc'd bethanechol   - Improving  - Likely has underlying dementia which became more prominent with change in location     PUD (peptic ulcer disease)  Assessment & Plan  - S/p EGD showed 3 duodenal ulcers, nonbleeding. Gastritis also seen.  - Continue PPI    Debility  Assessment & Plan  - PT/OT rec SNF    Skin ulcer of heel, limited to breakdown of skin (CMS/[facility])  Assessment & Plan  - Present on admission  - Podiatry on board, appreciate recs  - Xrays with superficial heel ulcer  - NATHALIE US showed normal arterial perfusion to both ankles     Pain and swelling of left wrist  Assessment & Plan  - Patient reported recent fall  - Xrays neg for fracture    Edema  Assessment & Plan  - Likley secondary to Norvasc use, dc'd  - LE US this admission neg for DVT  - Continue AmLactin, Ace wrap    Iron deficiency anemia  Assessment & Plan  - Hemoglobin of 6.7 on admission > s/p 1 unit PRBC, hgb 7.4 thereafter  - GI and heme/onc on board  - S/p EGD 10/11 showing multiple duodenal ulcers and gastritis. On PPI.  - Patient's son wishes to avoid colonoscopy since a potential answer for the iron deficiency anemia has been ascertained. GI did warn the son that they would not be able to rule out colon cancer without the colonoscopy. He is okay with that.  - Continue IV iron while hospitalized, then start oral iron supplementation at the time of  discharge.    Hypothyroid  Assessment & Plan  - Continue home med synthroid        VTE Assessment: Padua    VTE Prophylaxis:  Current anticoagulants:  enoxaparin (LOVENOX) syringe 40 mg, subcutaneous, Daily (6p)      Code Status: Full Code      Estimated Discharge Date: 10/15/2024   Disposition Planning: Pending SNF placement.      Ashley Moreno MD  10/14/2024

## 2024-10-14 NOTE — PROGRESS NOTES
Hematology/Oncology -  Daily Progress Note       SUBJECTIVE   Interval History: 10/14/24 -patient reports feeling well.  Denies any concerns to me.  Difficult to converse due to being very hard of hearing.     OBJECTIVE      Vital signs in last 24 hours:  Temp:  [36.8 °C (98.3 °F)-37 °C (98.6 °F)] 36.9 °C (98.4 °F)  Heart Rate:  [65-75] 65  Resp:  [19-20] 19  BP: (134-168)/(63-71) 134/64    Intake/Output Summary (Last 24 hours) at 10/14/2024 1818  Last data filed at 10/14/2024 1315  Gross per 24 hour   Intake 840 ml   Output 1000 ml   Net -160 ml       PHYSICAL EXAMINATION          Physical Examination:     Constitutional:       Appearance: She is not ill-appearing.  No signs of distress.  Very hard of hearing  Cardiovascular:      Rate and Rhythm: Normal rate and regular rhythm.   Pulmonary:      Effort: Pulmonary effort is normal at rest on room air  Abdominal:      Palpations: Abdomen is soft and nondistended.   Skin:     Coloration: Skin is not jaundiced.   Neurological:      General: No focal deficit present.      Mental Status: She is alert.   Psychiatric:         Mood and Affect: Mood normal.      LABS / IMAGING / TELE      Labs  Recent Results (from the past 24 hours)   Magnesium    Collection Time: 10/14/24  5:40 AM   Result Value Ref Range    Magnesium 2.1 1.8 - 2.5 mg/dL   Basic metabolic panel    Collection Time: 10/14/24  5:40 AM   Result Value Ref Range    Sodium 130 (L) 136 - 145 mEQ/L    Potassium 4.2 3.5 - 5.1 mEQ/L    Chloride 99 98 - 107 mEQ/L    CO2 26 21 - 31 mEQ/L    BUN 18 7 - 25 mg/dL    Creatinine 0.6 0.6 - 1.2 mg/dL    Glucose 97 70 - 99 mg/dL    Calcium 7.8 (L) 8.6 - 10.3 mg/dL    eGFR >60.0 >=60.0 mL/min/1.73m*2    Anion Gap 5 3 - 15 mEQ/L   CBC    Collection Time: 10/14/24  5:40 AM   Result Value Ref Range    WBC 5.95 3.80 - 10.50 K/uL    RBC 3.03 (L) 3.93 - 5.22 M/uL    Hemoglobin 8.5 (L) 11.8 - 15.7 g/dL    Hematocrit 26.6 (L) 35.0 - 45.0 %    MCV 87.8 83.0 - 98.0 fL    MCH 28.1  28.0 - 33.2 pg    MCHC 32.0 (L) 32.2 - 35.5 g/dL    RDW 17.3 (H) 11.7 - 14.4 %    Platelets 281 150 - 369 K/uL    MPV 9.5 9.4 - 12.3 fL   Phosphorus    Collection Time: 10/14/24  5:40 AM   Result Value Ref Range    Phosphorus 2.8 2.4 - 4.7 mg/dL       Imaging  No results found.  Not applicable    ASSESSMENT AND PLAN      Iron deficiency anemia  Assessment & Plan  She is acute anemia that developed over the last 4 months. (12 g/dl > 6.7 g/dl)  She has normal white blood cell count with a fairly normal differential.  Platelet count is slightly elevated reactive to iron deficiency anemia  She has low iron, iron saturation.  Ferritin not sent.  Folate and B12 levels are normal  CMP shows normal renal function, normal total protein and fairly normal electrolytes.    Will check reticulocyte count and reticulated hemoglobin.  Addendum: Retic hemoglobin low at 21 consistent with iron deficiency anemia.  Reticulocyte count low or hypoproliferative  GI consulted and CT of the abdomen and pelvis ordered.  No acute pathology found.  ?  Endoscopic evaluation  Starting Ferrlecit 125 mg intravenously daily x 8 infusions  Will also send peripheral blood flow cytometry however suspicion for an underlying lymphoproliferative disorder low with findings of iron deficiency anemia.    10/10/24  She tolerates IV iron very well.  Will continue.  GI is on board for possible scope pending discussion with the family members.  She denies any bleeding at this point.  She is not symptomatic.  Okay to transfuse to keep hemoglobin above 8 if she has chest pain or shortness of breath.  Otherwise we can keep HGB above 7.    10/12/24  She had a EGD on October 11, found to have nonbleeding duodenal ulcers.  Her son is not interested in colonoscopy at this point.  As such we will continue IV iron to give her totally 1000 mg iron.  Depending on the timing of her discharge we will arrange the rest as outpatient.  Transfuse to keep hemoglobin above  7.  Will continue to follow along.    10/14/24 -Labs reviewed and overall findings reflect stable H&H.  -We have not encountered any overt bleeding.  At this point plan is for supportive measures and no further endoscopic evaluation.  -Recommend continued parenteral iron supplementation with close follow-up in outpatient setting upon discharge as patient may need additional iron supplementation +/- TRACIE therapy for her chronic anemia issues.  -With no evidence or concern for overt bleeding can reduce CBC checks to every other day helping reduce iatrogenic blood loss.         Code Status: Full Code     Will follow along peripherally.  Please call us any questions or concerns you may have.  Thanks    Jake Jasmine MD  10/14/2024  6:18 PM

## 2024-10-14 NOTE — PLAN OF CARE
Care Coordination Discharge Plan Note     Discharge Needs Assessment  Concerns to be Addressed: care coordination/care conferences, discharge planning  Current Discharge Risk:      Anticipated Discharge Plan  Anticipated Discharge Disposition: skilled nursing facility, nursing home/senior living care       Patient Choice  Offered/Gave Vendor List: yes  Patient's Choice of Community Agency(s):      Patient and/or patient guardian/advocate was made aware of their right to choose a provider. A list of eligible providers was presented and reviewed with the patient and/or patient guardian/advocate in written and/or verbal form. The list delineates providers in the patient’s desired geographic area who are participating in the Medicare program and/or providers contracted with the patient’s primary insurance. The Medicare list and quality ratings were obtained from the Medicare.gov [medicare.gov] website.    ---------------------------------------------------------------------------------------------------------------------    Interdisciplinary Discharge Plan Review:  Participants:physician, nursing, social work/services, family/lay caregiver    Concerns Comments: Spoke to patient's son Raymond to inform that [facility] not [facility] available.  [facility] may have a bed available pending auth when patient is medically stable.    ADDENDUM [id number]: Son is waiting to speak to [facility].  Secure chat sent to [facility] to call son.      ADDENDUM 1526: [facility] that [facility] may have a bed available.  She is going to reach out to son to discuss.   Discharge Plan:   Disposition/Destination: Skilled Nursing Facility - Other  / Skilled Nursing Facility  Discharge Facility:    Community Resources:      Discharge Transportation:  Is Out of Hospital DNR needed at Discharge: no  Does patient need discharge transport? Yes

## 2024-10-14 NOTE — PLAN OF CARE
Problem: Adult Inpatient Plan of Care  Goal: Plan of Care Review  10/14/2024 0356 by Mariana Regan, RN  Outcome: Progressing  Flowsheets (Taken 10/14/2024 0356)  Progress: improving  Outcome Evaluation: Pt denies any pain this shift. BLLE with dressings intact, waffle boots on. Pt is continent and incontinent of bladder, no BM this shift but states she is passing flatus. Bed alarm on for safety, call bell within reach.  Plan of Care Reviewed With: patient  10/14/2024 0356 by Mariana Regan, RN  Outcome: Progressing       None

## 2024-10-15 LAB
ANION GAP SERPL CALC-SCNC: 8 MEQ/L (ref 3–15)
BUN SERPL-MCNC: 25 MG/DL (ref 7–25)
CALCIUM SERPL-MCNC: 8 MG/DL (ref 8.6–10.3)
CHLORIDE SERPL-SCNC: 100 MEQ/L (ref 98–107)
CO2 SERPL-SCNC: 25 MEQ/L (ref 21–31)
CREAT SERPL-MCNC: 0.6 MG/DL (ref 0.6–1.2)
EGFRCR SERPLBLD CKD-EPI 2021: >60 ML/MIN/1.73M*2
ERYTHROCYTE [DISTWIDTH] IN BLOOD BY AUTOMATED COUNT: 17.7 % (ref 11.7–14.4)
GLUCOSE SERPL-MCNC: 95 MG/DL (ref 70–99)
HCT VFR BLD AUTO: 28.3 % (ref 35–45)
HGB BLD-MCNC: 8.9 G/DL (ref 11.8–15.7)
MCH RBC QN AUTO: 28 PG (ref 28–33.2)
MCHC RBC AUTO-ENTMCNC: 31.4 G/DL (ref 32.2–35.5)
MCV RBC AUTO: 89 FL (ref 83–98)
PLATELET # BLD AUTO: 310 K/UL (ref 150–369)
PMV BLD AUTO: 9.8 FL (ref 9.4–12.3)
POTASSIUM SERPL-SCNC: 4.3 MEQ/L (ref 3.5–5.1)
RBC # BLD AUTO: 3.18 M/UL (ref 3.93–5.22)
SODIUM SERPL-SCNC: 133 MEQ/L (ref 136–145)
WBC # BLD AUTO: 8.85 K/UL (ref 3.8–10.5)

## 2024-10-15 PROCEDURE — 63600000 HC DRUGS/DETAIL CODE: Mod: JZ | Performed by: INTERNAL MEDICINE

## 2024-10-15 PROCEDURE — 99232 SBSQ HOSP IP/OBS MODERATE 35: CPT | Performed by: FAMILY MEDICINE

## 2024-10-15 PROCEDURE — 63700000 HC SELF-ADMINISTRABLE DRUG: Performed by: INTERNAL MEDICINE

## 2024-10-15 PROCEDURE — 85027 COMPLETE CBC AUTOMATED: CPT | Performed by: FAMILY MEDICINE

## 2024-10-15 PROCEDURE — 63700000 HC SELF-ADMINISTRABLE DRUG: Performed by: HOSPITALIST

## 2024-10-15 PROCEDURE — 80048 BASIC METABOLIC PNL TOTAL CA: CPT | Performed by: FAMILY MEDICINE

## 2024-10-15 PROCEDURE — 63700000 HC SELF-ADMINISTRABLE DRUG: Performed by: FAMILY MEDICINE

## 2024-10-15 PROCEDURE — 36415 COLL VENOUS BLD VENIPUNCTURE: CPT | Performed by: FAMILY MEDICINE

## 2024-10-15 PROCEDURE — 25800000 HC PHARMACY IV SOLUTIONS: Performed by: INTERNAL MEDICINE

## 2024-10-15 PROCEDURE — 97530 THERAPEUTIC ACTIVITIES: CPT | Mod: GO

## 2024-10-15 PROCEDURE — 97530 THERAPEUTIC ACTIVITIES: CPT | Mod: GP,CQ

## 2024-10-15 PROCEDURE — 12000000 HC ROOM AND CARE MED/SURG

## 2024-10-15 RX ORDER — NYSTATIN 100000 [USP'U]/G
POWDER TOPICAL 2 TIMES DAILY
Status: DISCONTINUED | OUTPATIENT
Start: 2024-10-15 | End: 2024-10-16 | Stop reason: HOSPADM

## 2024-10-15 RX ADMIN — Medication 6 MG: at 20:37

## 2024-10-15 RX ADMIN — LEVOTHYROXINE SODIUM 88 MCG: 0.09 TABLET ORAL at 05:19

## 2024-10-15 RX ADMIN — SENNOSIDES AND DOCUSATE SODIUM 2 TABLET: 8.6; 5 TABLET ORAL at 20:37

## 2024-10-15 RX ADMIN — NYSTATIN: 100000 POWDER TOPICAL at 20:37

## 2024-10-15 RX ADMIN — NYSTATIN: 100000 POWDER TOPICAL at 12:49

## 2024-10-15 RX ADMIN — SODIUM CHLORIDE 125 MG: 9 INJECTION, SOLUTION INTRAVENOUS at 08:39

## 2024-10-15 RX ADMIN — POLYETHYLENE GLYCOL 3350 17 G: 17 POWDER, FOR SOLUTION ORAL at 08:47

## 2024-10-15 RX ADMIN — PANTOPRAZOLE SODIUM 40 MG: 40 TABLET, DELAYED RELEASE ORAL at 08:46

## 2024-10-15 RX ADMIN — PANTOPRAZOLE SODIUM 40 MG: 40 TABLET, DELAYED RELEASE ORAL at 20:37

## 2024-10-15 RX ADMIN — FLUTICASONE PROPIONATE 1 SPRAY: 50 SPRAY, METERED NASAL at 08:43

## 2024-10-15 RX ADMIN — PRAVASTATIN SODIUM 20 MG: 20 TABLET ORAL at 20:37

## 2024-10-15 RX ADMIN — ENOXAPARIN SODIUM 40 MG: 40 INJECTION SUBCUTANEOUS at 18:15

## 2024-10-15 ASSESSMENT — COGNITIVE AND FUNCTIONAL STATUS - GENERAL
HELP NEEDED FOR PERSONAL GROOMING: 2 - A LOT
HELP NEEDED FOR BATHING: 2 - A LOT
MOVING TO AND FROM BED TO CHAIR: 2 - A LOT
MOVING TO AND FROM BED TO CHAIR: 2 - A LOT
TOILETING: 1 - TOTAL
STANDING UP FROM CHAIR USING ARMS: 2 - A LOT
DRESSING REGULAR LOWER BODY CLOTHING: 1 - TOTAL
AFFECT: CONFUSED
CLIMB 3 TO 5 STEPS WITH RAILING: 1 - TOTAL
STANDING UP FROM CHAIR USING ARMS: 2 - A LOT
AFFECT: CONFUSED
DRESSING REGULAR UPPER BODY CLOTHING: 2 - A LOT
EATING MEALS: 3 - A LITTLE
WALKING IN HOSPITAL ROOM: 2 - A LOT
CLIMB 3 TO 5 STEPS WITH RAILING: 1 - TOTAL
WALKING IN HOSPITAL ROOM: 2 - A LOT

## 2024-10-15 NOTE — PROGRESS NOTES
Occupational Therapy -  Daily Treatment/Progress Note     Patient: Sofie Stewart  Location: [address]  MRN: [id number]  Today's date: 10/15/2024    HISTORY OF PRESENT ILLNESS     Sofie is a 89 y.o. female admitted on 10/8/2024 with Delusion (CMS/HCC) [F22]  Hallucination [R44.3]  Anemia, unspecified type [D64.9]  Edema, unspecified type [R60.9]. Principal problem is Toxic encephalopathy.    Past Medical History  Sofie has a past medical history of Hypertension, Hypothyroidism, and Lipid disorder.    History of Present Illness   89 y.o. female with a past medical history of hypertension and hypercholesterolemia who recently was transition to assisted living from independent living.  She began having delusions and hallucinations at the assisted living.  This is unlike her as she does not have a history of dementia or mental health issues.  She was expressing thoughts that she thought her son was out to get her money and planning to kill her.  She also states that she was hearing voices and having visual hallucinations where she was seeing red spots.  When I saw her she was trying to sleep.  She is extraordinarily hard of hearing.  She appears to be lucid for the ER staff and for the nursing staff.  The family had expressed to the ER that this is unusual behavior for her and only started after she moved to assisted living the last couple weeks.  The patient had a negative evaluation in the emergency room but was severely anemic with a hemoglobin of 6.7.  The patient states that she may have been having dark's stools but she is not sure.  She was heme-negative in the emergency room.   PRIOR LEVEL OF FUNCTION AND LIVING ENVIRONMENT     Prior Level of Function      Flowsheet Row Most Recent Value   Dominant Hand right   Ambulation completely dependent   Transferring assistive person   Toileting assistive person   Bathing assistive person   Dressing assistive person   Eating independent   IADLs  assistive person   Prior Level of Function Comment Pt reports assist from [facility] staff with w/c transfers, per EMR 1-person assist. Pt reports use of w/c as primary mode of mobility. Reports assist with all ADLs from staff.   Assistive Device Currently Used at Home wheelchair             Prior Living Environment      Flowsheet Row Most Recent Value   People in Home facility resident   Current Living Arrangements assisted living facility   Home Accessibility wheelchair accessible   Living Environment Comment [facility]          Occupational Profile      Flowsheet Row Most Recent Value   Occupational History/Life Experiences recently transitioned from ILF to personal care unit   Performance Patterns assistance with ADLs   Environmental Supports and Barriers facility support          VITALS AND PAIN      Therapy Pain/Vitals - 10/15/24 1132          Pain/Comfort/Sleep    Pain Charting Type Pain Assessment     (0-10) Pain Rating: Rest 0     (0-10) Pain Rating: Activity 0        Vitals    Pulse 70     SpO2 97 %     /65     BP Method Automatic                        Objective   OBJECTIVE     Start time:  1133  End time:  1154  Session Length: 21 min  Mode of Treatment: co-treatment  Reason for co-treatment: priority for auth    General Observations  Patient received reclined, in bed. She was agreeable to therapy, no issues or concerns identified by nurse prior to session. B/L waffle boots and ace wraps on LEs    Precautions: fall       Limitations/Impairments: hearing, safety/cognitive      OT Eval and Treat - 10/15/24 1133          Cognition    Orientation Status oriented to;person;disoriented to;time;place   place=nursing home    Affect/Mental Status confused     Follows Commands follows one-step commands;75-90% accuracy;delayed response/completion;increased processing time needed;repetition of directions required     Cognitive Function executive function deficit;memory deficit;safety deficit      Executive Function Deficit information processing;insight/awareness of deficits;self-monitoring/self-correction;planning/decision-making;problem-solving/reasoning     Memory Deficit moderate deficit;short-term memory;long-term memory     Safety Deficit insight into deficits/self-awareness;judgment;safety precautions awareness;awareness of need for assistance;problem-solving     Comment, Cognition pleasant and cooperative, receptive to cues and education, decreased insight into deficits and need for assistance        Bed Mobility    Bed Mobility Activities supine to sit;left     [place] moderate assist (50-74% patient effort);2 person assist     Safety/Cues increased time to complete;moderate;verbal cues;tactile cues;hand placement;sequencing;technique;maintaining center of gravity over base of support     Assistive Device head of bed elevated;bed rails;draw sheet     Comment assistance with LE and trunk control. left lateral lean once EOB, verbal and tactile cues required for upright posture.        Mobility Belt    Mobility Belt Used During Session yes        Sit/Stand Transfer    Surface edge of bed     [place] moderate assist (50-74% patient effort);2 person assist     Safety/Cues moderate;verbal cues;hand placement;technique;sequencing;maintaining center of gravity over base of support     Assistive Device walker, front-wheeled     Transfer Comments slow to rise, flexed forward posture, cues for upright stance. unable to initiate steps        Surface-to-Surface Transfers    Transfer Location bed to chair     Transfer Technique lateral     [place] moderate assist (50-74% patient effort);2 person assist     Safety/Cues increased time to complete;moderate;verbal cues;sequencing;technique     Assistive Device none     Transfer Comments anterior/posterior approach, low pivot to chair        Lower Body Dressing    Tasks don;doff;socks;underwear     [place] dependent (less than 25% patient effort)      Position supine;supported standing     Adaptive Equipment none        Balance    Static Sitting Balance mild impairment;sitting, edge of bed     Dynamic Sitting Balance moderate impairment;sitting, edge of bed     Sit to Stand Dynamic Balance moderate impairment;supported     Static Standing Balance moderate impairment;supported     Dynamic Standing Balance unable to perform activity     Comment, Balance Mod A x2 low pivot to chair. left lateral lean sitting EOB        Impairments/Safety Issues    Impairments Affecting Function balance;cognition;endurance/activity tolerance;range of motion (ROM);strength;pain;postural/trunk control     Cognitive Impairments, Safety/Performance insight into deficits/self-awareness;safety precaution awareness;sequencing abilities;judgment;problem-solving/reasoning     Functional Endurance Fair                                  Session Outcome  Patient reclined, in chair at end of session, chair alarm on, all needs met, call light in reach, personal items in reach. Nursing notified about patient's performance, patient's position, and patient's response to therapy/activity.    AM-PAC™ - ADL (Current Function)     Putting on/taking off regular lower body clothing 1 - Total   Bathing 2 - A Lot   Toileting 1 - Total   Putting on/taking off regular upper body clothing 2 - A Lot   Help for taking care of personal grooming 2 - A Lot   Eating meals 3 - A Little   AM-PAC™ ADL Score 11      ASSESSMENT AND PLAN     Progress Summary   OT treat complete, ADL AM-PAC score of 11. Patient continues to present Mod A x2 supine to sit, sit<>stand and low pivot transfer from bed to chair, dependent LB dressing. Patient will continue to benefit from skilled OT services to maximize independence with self care and functional mobility. Recommend SNF once medically stable.      Patient/Family Therapy Goal Statement: return to PLOF    OT Plan      Flowsheet Row Most Recent Value   Rehab Potential fair, will  monitor progress closely at 10/10/2024 1107   Therapy Frequency 3 times/wk at 10/10/2024 1107   Planned Therapy Interventions activity tolerance training, adaptive equipment training, BADL retraining, functional balance retraining, ROM/therapeutic exercise, transfer/mobility retraining, patient/caregiver education/training, occupation/activity based interventions at 10/10/2024 1107            OT Discharge Recommendations      Flowsheet Row Most Recent Value   OT Recommended Discharge Disposition skilled nursing facility at 10/10/2024 1107   Anticipated Equipment Needs if Discharged Home (OT) none at 10/10/2024 1107                 OT Goals      Flowsheet Row Most Recent Value   Bed Mobility Goal 1    Activity/Assistive Device bed mobility activities, all at 10/10/2024 1107   [place] minimum assist (75% or more patient effort) at 10/10/2024 1107   Time Frame by discharge at 10/10/2024 1107   Progress/Outcome goal ongoing at 10/10/2024 1107   Transfer Goal 1    Activity/Assistive Device all transfers at 10/10/2024 1107   [place] minimum assist (75% or more patient effort) at 10/10/2024 1107   Time Frame by discharge at 10/10/2024 1107   Progress/Outcome goal ongoing at 10/10/2024 1107   Dressing Goal 1    Activity/Adaptive Equipment upper body dressing at 10/10/2024 1107   [place] moderate assist (50-74% patient effort) at 10/10/2024 1107   Time Frame by discharge at 10/10/2024 1107   Progress/Outcome goal ongoing at 10/10/2024 1107   Grooming Goal 1    Activity/Assistive Device grooming skills, all at 10/10/2024 1107   [place] minimum assist (75% or more patient effort) at 10/10/2024 1107   Time Frame by discharge at 10/10/2024 1107   Progress/Outcome goal ongoing at 10/10/2024 1107

## 2024-10-15 NOTE — PROGRESS NOTES
Hospital Medicine     Daily Progress Note       SUBJECTIVE   Interval History: Pt sleeping. She is medically stable for discharge. Awaiting placement.      OBJECTIVE      Vital signs in last 24 hours:  Temp:  [36.3 °C (97.4 °F)-36.6 °C (97.9 °F)] 36.3 °C (97.4 °F)  Heart Rate:  [70] 70  Resp:  [16-18] 16  BP: (144-149)/(65-67) 144/65    Intake/Output Summary (Last 24 hours) at 10/15/2024 1349  Last data filed at 10/15/2024 0545  Gross per 24 hour   Intake 240 ml   Output --   Net 240 ml       PHYSICAL EXAMINATION      Physical Exam  Constitutional:       General: She is not in acute distress.  HENT:      Head: Normocephalic.      Nose: Nose normal.      Mouth/Throat:      Mouth: Mucous membranes are moist.   Eyes:      General: No scleral icterus.  Cardiovascular:      Heart sounds: Normal heart sounds.   Pulmonary:      Breath sounds: Normal breath sounds.   Abdominal:      General: There is no distension.      Palpations: Abdomen is soft.   Musculoskeletal:         General: No swelling.   Skin:     Coloration: Skin is not jaundiced.   Neurological:      Mental Status: She is alert. Mental status is at baseline.        LINES, CATHETERS, DRAINS, AIRWAYS, AND WOUNDS   Lines, Drains, and Airways:  Wounds (agree with documentation and present on admission):  Peripheral IV (Adult) 10/13/24 Anterior;Right Forearm (Active)   Number of days: 2       Wound Pressure Injury Right Heel (Active)   Number of days: 6       Wound Pressure Injury Left Heel (Active)   Number of days: 6       Wound Abrasion Right Calf (Active)   Number of days: 6       Wound Abrasion Right Pretibial (Active)   Number of days: 6       Wound Blister(s) Proximal;Right Pretibial (Active)   Number of days: 6       Wound Abrasion Anterior;Left Ankle (Active)   Number of days: 6       Wound Blister(s) Left Pretibial (Active)   Number of days: 6         Comments:    LABS / IMAGING / TELE      Labs  Lab Results   Component Value Date    WBC 8.85  10/15/2024    HGB 8.9 (L) 10/15/2024    HCT 28.3 (L) 10/15/2024    MCV 89.0 10/15/2024     10/15/2024     Lab Results   Component Value Date    GLUCOSE 95 10/15/2024    CALCIUM 8.0 (L) 10/15/2024     (L) 10/15/2024    K 4.3 10/15/2024    CO2 25 10/15/2024     10/15/2024    BUN 25 10/15/2024    CREATININE 0.6 10/15/2024       ASSESSMENT AND PLAN      * Toxic encephalopathy  Assessment & Plan  - Patient was expressing paranoid thoughts which is unusual for her  - Normal UA and normal CT scan of the brain  - TSH, vitamin B12, folate normal  - Dc'd bethanechol   - Improving  - Likely has underlying dementia which became more prominent with change in location     PUD (peptic ulcer disease)  Assessment & Plan  - S/p EGD showed 3 duodenal ulcers, nonbleeding. Gastritis also seen.  - Continue PPI    Debility  Assessment & Plan  - PT/OT rec SNF    Skin ulcer of heel, limited to breakdown of skin (CMS/HCC)  Assessment & Plan  - Present on admission  - Podiatry on board, appreciate recs  - Xrays with superficial heel ulcer  - NATHALIE US showed normal arterial perfusion to both ankles     Pain and swelling of left wrist  Assessment & Plan  - Patient reported recent fall  - Xrays neg for fracture    Edema  Assessment & Plan  - Likley secondary to Norvasc use, dc'd  - LE US this admission neg for DVT  - Continue AmLactin, Ace wrap    Iron deficiency anemia  Assessment & Plan  - Hemoglobin of 6.7 on admission > s/p 1 unit PRBC, hgb 7.4 thereafter  - GI and heme/onc on board  - S/p EGD 10/11 showing multiple duodenal ulcers and gastritis. On PPI.  - Patient's son wishes to avoid colonoscopy since a potential answer for the iron deficiency anemia has been ascertained. GI did warn the son that they would not be able to rule out colon cancer without the colonoscopy. He is okay with that.  - Continue IV iron while hospitalized, then start oral iron supplementation at the time of discharge.    Hypothyroid  Assessment &  Plan  - Continue home med synthroid        VTE Assessment: Padua    VTE Prophylaxis:  Current anticoagulants:  enoxaparin (LOVENOX) syringe 40 mg, subcutaneous, Daily (6p)      Code Status: Full Code      Estimated Discharge Date: 10/16/2024     Disposition Planning: Pending SNF placement.      Ashley Moreno MD  10/15/2024

## 2024-10-15 NOTE — PROGRESS NOTES
Physical Therapy -  Daily Treatment/Progress Note     Patient: Sofie Stewart  Location: [address]  MRN: [id number]  Today's date: 10/15/2024    HISTORY OF PRESENT ILLNESS     Sofie is a 89 y.o. female admitted on 10/8/2024 with Delusion (CMS/HCC) [F22]  Hallucination [R44.3]  Anemia, unspecified type [D64.9]  Edema, unspecified type [R60.9]. Principal problem is Toxic encephalopathy.    Past Medical History  Sofie has a past medical history of Hypertension, Hypothyroidism, and Lipid disorder.    History of Present Illness   89 y.o. female with a past medical history of hypertension and hypercholesterolemia who recently was transition to assisted living from independent living.  She began having delusions and hallucinations at the assisted living.  This is unlike her as she does not have a history of dementia or mental health issues.  She was expressing thoughts that she thought her son was out to get her money and planning to kill her.  She also states that she was hearing voices and having visual hallucinations where she was seeing red spots.  When I saw her she was trying to sleep.  She is extraordinarily hard of hearing.  She appears to be lucid for the ER staff and for the nursing staff.  The family had expressed to the ER that this is unusual behavior for her and only started after she moved to assisted living the last couple weeks.  The patient had a negative evaluation in the emergency room but was severely anemic with a hemoglobin of 6.7.  The patient states that she may have been having dark's stools but she is not sure.  She was heme-negative in the emergency room.   PRIOR LEVEL OF FUNCTION AND LIVING ENVIRONMENT     Prior Level of Function      Flowsheet Row Most Recent Value   Dominant Hand right   Ambulation completely dependent   Transferring assistive person   Toileting assistive person   Bathing assistive person   Dressing assistive person   Eating independent   IADLs  assistive person   Prior Level of Function Comment Pt reports assist from [facility] staff with w/c transfers, per EMR 1-person assist. Pt reports use of w/c as primary mode of mobility. Reports assist with all ADLs from staff.   Assistive Device Currently Used at Home wheelchair             Prior Living Environment      Flowsheet Row Most Recent Value   People in Home facility resident   Current Living Arrangements assisted living facility   Home Accessibility wheelchair accessible   Living Environment Comment [facility]          VITALS AND PAIN     PT Vitals      Date/Time Pulse SpO2 BP BP Method [facility]   10/15/24 1132 70 97 % 144/65 Automatic JONATHAN          PT Pain      Date/Time Pain Type Rating: Rest Rating: Activity [facility]   10/15/24 1132 Pain Assessment 0 0 JONATHAN             Objective   OBJECTIVE     Start time:  1132  End time:  1151  Session Length: 19 min  Mode of Treatment: co-treatment  Reason for co-treatment: Auth    General Observations  Patient received in bed, reclined. She was agreeable to therapy, no issues or concerns identified by nurse prior to session. Awake    Precautions: fall       Limitations/Impairments: hearing, safety/cognitive      PT Eval and Treat - 10/15/24 1132          Cognition    Orientation Status oriented to;person     Affect/Mental Status confused     Follows Commands follows one-step commands;75-90% accuracy;delayed response/completion;increased processing time needed;repetition of directions required        Bed Mobility    [place] moderate assist (50-74% patient effort);2 person assist     Comment to L Pt. assisting        Mobility Belt    Mobility Belt Used During Session yes        Sit/Stand Transfer    [place] moderate assist (50-74% patient effort);2 person assist     Transfer Comments Not able to weight shift to step to chair        Surface-to-Surface Transfers    Transfer Location bed to chair     [place] moderate assist (50-74% patient effort);2 person  assist     Safety/Cues increased time to complete     Transfer Comments Pt. partial stands three times to move to chair fair endurance this session. Arm taken off chair        Impairments/Safety Issues    Impairments Affecting Function balance;cognition;endurance/activity tolerance;range of motion (ROM);strength;pain     Cognitive Impairments, Safety/Performance insight into deficits/self-awareness;safety precaution awareness;sequencing abilities;judgment;problem-solving/reasoning                                    Education Documentation  Signs/Symptoms, taught by Waldemar Goldsmith PTA at 10/15/2024 11:58 AM.  Learner: Patient  Readiness: Acceptance  Method: Explanation, Demonstration  Response: Verbalizes Understanding, Needs Reinforcement  Comment: SPT to chair        Session Outcome  Patient reclined, in chair at end of session, chair alarm on. Nursing notified about change in vital signs, patient's response to therapy/activity, and other (see comments).    AM-PAC™ - Mobility (Current Function)     Turning form your back to your side while in flat bed without using bedrails 2 - A Lot   Moving from lying on your back to sitting on the side of a flat bed without using bedrails 2 - A Lot   Moving to and from a bed to a chair 2 - A Lot   Standing up from a chair using your arms 2 - A Lot   To walk in a hospital room 2 - A Lot   Climbing 3-5 steps with a railing 1 - Total   AM-PAC™ Mobility Score 11      ASSESSMENT AND PLAN     Progress Summary  AMPAC11 Bed mobiiy Mod A of 1 Transfers Mod A of 2 ant. Post. assist with arm of chair without AD  off patient with fair undurance. Pt will benefit from cont. PT    Patient/Family Therapy Goals Statement: To get better    PT Plan      Flowsheet Row Most Recent Value   Rehab Potential fair, will monitor progress closely at 10/15/2024 1132   Therapy Frequency 3 times/wk at 10/15/2024 1132   Planned Therapy Interventions balance training, bed mobility training, strengthening,  patient/family education, transfer training, ROM (range of motion), wheelchair management/propulsion training at 10/15/2024 1132            PT Discharge Recommendations      Flowsheet Row Most Recent Value   PT Recommended Discharge Disposition skilled nursing facility at 10/15/2024 1132   Anticipated Equipment Needs if Discharged Home (PT) none at 10/15/2024 1132                 PT Goals      Flowsheet Row Most Recent Value   Bed Mobility Goal 1    Activity/Assistive Device sit to supine/supine to sit at 10/10/2024 1109   [place] minimum assist (75% or more patient effort) at 10/10/2024 1109   Time Frame by discharge at 10/10/2024 1109   Progress/Outcome goal ongoing at 10/10/2024 1109   Transfer Goal 1    Activity/Assistive Device sit-to-stand/stand-to-sit, bed-to-chair/chair-to-bed, other (see comments)  [LRAD versus no AD] at 10/10/2024 1109   [place] minimum assist (75% or more patient effort) at 10/10/2024 1109   Time Frame by discharge at 10/10/2024 1109   Progress/Outcome goal ongoing at 10/10/2024 1109

## 2024-10-15 NOTE — PLAN OF CARE
Plan of Care Review  Plan of Care Reviewed With: patient  Progress: improving  Outcome Evaluation: Patient is alert and orienated but forgetful.  She is hard of hearing.  Patient turned and repositioned during the night.  Waffle boots in place  Some discomfort while moving her legs but otherwise denies any pain.  Wounds on legs and heels are covered with gauze and ace wrap, CDI. Incontience care provided during the shift.  Vital signs stable, call in reach and bed alarm active.  Awaiting discharge to SNF 10/15/24.

## 2024-10-16 VITALS
HEIGHT: 61 IN | BODY MASS INDEX: 30.51 KG/M2 | HEART RATE: 65 BPM | TEMPERATURE: 97.9 F | SYSTOLIC BLOOD PRESSURE: 140 MMHG | RESPIRATION RATE: 18 BRPM | DIASTOLIC BLOOD PRESSURE: 65 MMHG | WEIGHT: 161.6 LBS | OXYGEN SATURATION: 95 %

## 2024-10-16 PROCEDURE — 63700000 HC SELF-ADMINISTRABLE DRUG: Performed by: INTERNAL MEDICINE

## 2024-10-16 PROCEDURE — 25800000 HC PHARMACY IV SOLUTIONS: Performed by: INTERNAL MEDICINE

## 2024-10-16 PROCEDURE — 99239 HOSP IP/OBS DSCHRG MGMT >30: CPT | Performed by: FAMILY MEDICINE

## 2024-10-16 PROCEDURE — 63700000 HC SELF-ADMINISTRABLE DRUG: Performed by: HOSPITALIST

## 2024-10-16 PROCEDURE — 63600000 HC DRUGS/DETAIL CODE: Mod: JZ | Performed by: INTERNAL MEDICINE

## 2024-10-16 RX ORDER — AMMONIUM LACTATE 12 G/100G
CREAM TOPICAL 2 TIMES DAILY
Qty: 140 G | Refills: 0 | Status: SHIPPED | OUTPATIENT
Start: 2024-10-16 | End: 2024-11-15

## 2024-10-16 RX ORDER — NYSTATIN 100000 [USP'U]/G
POWDER TOPICAL 2 TIMES DAILY
Qty: 30 G | Refills: 1 | Status: SHIPPED | OUTPATIENT
Start: 2024-10-16 | End: 2024-11-15

## 2024-10-16 RX ADMIN — Medication: at 08:36

## 2024-10-16 RX ADMIN — POLYETHYLENE GLYCOL 3350 17 G: 17 POWDER, FOR SOLUTION ORAL at 08:35

## 2024-10-16 RX ADMIN — LEVOTHYROXINE SODIUM 88 MCG: 0.09 TABLET ORAL at 05:14

## 2024-10-16 RX ADMIN — SODIUM CHLORIDE 125 MG: 9 INJECTION, SOLUTION INTRAVENOUS at 08:35

## 2024-10-16 RX ADMIN — FLUTICASONE PROPIONATE 1 SPRAY: 50 SPRAY, METERED NASAL at 08:35

## 2024-10-16 RX ADMIN — PANTOPRAZOLE SODIUM 40 MG: 40 TABLET, DELAYED RELEASE ORAL at 08:39

## 2024-10-16 RX ADMIN — NYSTATIN: 100000 POWDER TOPICAL at 08:35

## 2024-10-16 ASSESSMENT — COGNITIVE AND FUNCTIONAL STATUS - GENERAL
MOVING TO AND FROM BED TO CHAIR: 2 - A LOT
WALKING IN HOSPITAL ROOM: 2 - A LOT
CLIMB 3 TO 5 STEPS WITH RAILING: 1 - TOTAL
STANDING UP FROM CHAIR USING ARMS: 2 - A LOT

## 2024-10-16 NOTE — NURSING NOTE
Patient left facility on stretcher accompanied by 2 EMT's with all personal belongings. Nurse to nurse report given to Shikha

## 2024-10-16 NOTE — UM PHYSICIAN REVIEW NOTE
Utilization Secondary Review Note      Patient Name: Sofie Stewart      MRN: [id number]]    Admission: 10/8 - 10/16    Presented w/ AMS, delusions, hallucinations.   Decreased oral intake.    No prior hx of psych issues.  Recently moved to [place].   Hgb 6.7 in ED (new, prior level 12).  Hemoccult negative   Imaging unremarkable     10/9 - heme consult - labs c/w JOSH, started on ferrelicit infusions   Pt w/ leg pain and dyspnea as well as left wrist swelling   1 unit PRBCs w/ Hgb 7.4   Seen by GI   Kelli placed on hold    10/10 - hallucinations improved   Hgb 7.8   Seen by PT/OT - rec for SNF   Podiatry for left heel ulcers, BLE venous stasis - local care    10/11 - EGD - gastritis, nonbleeding duodenal ulcers - may be source of slow bleed   Family opted to hold off on cscope    10/12 - 10/16 - awaiting placement at SNF   Continued on IV iron    10/16 - discharged         A request will be placed to [facility] for Peer to Peer.    Roxana Aguilar MD  10/16/2024

## 2024-10-16 NOTE — DISCHARGE INSTRUCTIONS
Pt's mental status is back to baseline after stopping her Bethanacol. Do not resume this medication.

## 2024-10-16 NOTE — PLAN OF CARE
Plan of Care Review  Plan of Care Reviewed With: patient  Progress: improving  Outcome Evaluation: Patient is alert and orienated but forgetful.  She is hard of hearing.  Patient turned and repositioned during the night.  Waffle boots in place  Some discomfort while moving her legs but otherwise denies any pain.  Wounds on legs and heels are covered with gauze and ace wrap, CDI.  Dressings are changed by podiatry.  Incontience care provided during the shift.  Vital signs stable, call in reach and bed alarm active.  Awaiting discharge to SNF 10/16/24.

## 2024-10-16 NOTE — PLAN OF CARE
Care Coordination Discharge Plan Summary    Admission Assessment Summary    General Information  Readmission Within the last 30 days: no previous admission in last 30 days  Does patient have a :    Patient-Specific Goals (include timeframe):      Living Arrangements  Arrived From: home  Current Living Arrangements: assisted living facility  People in Home: facility resident  Home Accessibility: wheelchair accessible  Living Arrangement Comments: [facility]    Social Drivers of Health - Screenings  Housing Stability  In the last 12 months, was there a time when you were not able to pay the mortgage or rent on time?: No  At any time in the past 12 months, were you homeless or living in a shelter (including now)?: No  Utility Access  In the past 12 months has the electric, gas, oil, or water company threatened to shut off services in your home?: No  Transportation Needs  In the past 12 months, has lack of transportation kept you from medical appointments or from getting medications?: No  In the past 12 months, has lack of transportation kept you from meetings, work, or from getting things needed for daily living?: No    Functional Status Prior to Admission  Assistive Device/Animal Currently Used at Home: wheelchair  Functional Status Comments: needs assistance with ADLS- she was a 1 person assist and could stand and pivot to wheelchair  IADL Comments: [facility] completes IADLS for her    Discharge Needs Assessment    Concerns to be Addressed: care coordination/care conferences, discharge planning  Current Discharge Risk:    Anticipated Changes Related to Illness: none    Discharge Plan Summary    Patient Choice  Offered/Gave Vendor List: yes  Patient's Choice of Community Agency(s): [facility]  Patient and/or patient guardian/advocate was made aware of their right to choose a provider. A list of eligible providers was presented and reviewed with the patient and/or patient  guardian/advocate in written and/or verbal form. The list delineates providers in the patient’s desired geographic area who are participating in the Medicare program and/or providers contracted with the patient’s primary insurance. The Medicare list and quality ratings were obtained from the Medicare.gov [medicare.gov] website.    Concerns / Comments: Patient has been accepted to [facility] and authorization was obtained.  Spoke to patient's son Raymond who is going to be coming in for the transport.  Transportation was set up for 11AM via [employer] Trip #[id number].    ADDENDUM 1002: Secure chat sent to [facility] to place discharge order as she stated patient is medically stable.  Transport is scheduled for 11AM.     ADDENDUM 1014: IMM presented and explained to patient and son.  Both agree with discharge plan.  Signed IMM was placed in scan bin.      Discharge Plan:  Disposition/Destination: Skilled Nursing Facility - Other  / Skilled Nursing Facility  Destination - Admitted Since 10/8/2024       Service Provider Services Address Phone Fax Patient Preferred Last Updated    [facility] [address] [facility] [address], [place] [address] [phone] -- Carmelina Dunbar MSW 10/15/2024 1151            Connection to Community  Patient declined offered resources.  Community Resources:      Discharge Transportation:  Is Out of Hospital DNR needed at Discharge: no  Does patient need discharge transport? Yes  Discharge Transportation Vendor: [employer] ([phone])  What day is the transport expected?: 10/16/24  What time is the transport expected?: 1100 (Trip #[id number])

## 2024-10-17 PROCEDURE — 99310 SBSQ NF CARE HIGH MDM 45: CPT | Performed by: REGISTERED NURSE

## 2024-10-18 PROCEDURE — 99306 1ST NF CARE HIGH MDM 50: CPT | Performed by: INTERNAL MEDICINE

## 2024-10-18 RX ORDER — FERROUS SULFATE 325(65) MG
325 TABLET, DELAYED RELEASE (ENTERIC COATED) ORAL
Qty: 30 TABLET | Refills: 0 | Status: SHIPPED | OUTPATIENT
Start: 2024-10-18 | End: 2024-11-17

## 2024-10-18 RX ORDER — PANTOPRAZOLE SODIUM 40 MG/1
40 TABLET, DELAYED RELEASE ORAL DAILY
Qty: 30 TABLET | Refills: 0 | Status: SHIPPED | OUTPATIENT
Start: 2024-10-18 | End: 2024-11-17

## 2024-10-18 NOTE — DISCHARGE SUMMARY
Hospital Medicine    Inpatient Discharge Summary        BRIEF OVERVIEW   Patient: Sofie Stewart (1/3/1931)    Admitting Provider: Sofie Cooper MD  Attending Provider: Ashley Moreno MD    PCP: Brandi Weeks [phone]    Admission Date: 10/8/2024  Discharge Date: 10/18/2024     DISCHARGE DIAGNOSES      Primary Discharge Diagnosis  Toxic encephalopathy    Secondary Discharge Diagnoses  Active Hospital Problems    Diagnosis Date Noted    Toxic encephalopathy 10/08/2024     Priority: High    PUD (peptic ulcer disease) 10/11/2024     Priority: Medium    Skin ulcer of heel, limited to breakdown of skin (CMS/[facility]) 10/10/2024     Priority: Medium    Debility 10/10/2024     Priority: Medium    Iron deficiency anemia 10/09/2024     Priority: Medium    Edema 10/09/2024     Priority: Medium    Pain and swelling of left wrist 10/09/2024     Priority: Medium    Hypothyroid 02/29/2024     Priority: Low      Resolved Hospital Problems    Diagnosis Date Noted Date Resolved    HTN (hypertension) 02/29/2024 10/12/2024                  SUMMARY OF HOSPITALIZATION     Presenting Problem/History of Present Illness  This is a 89 y.o. year-old female admitted on 10/8/2024 with Toxic encephalopathy.      Hospital Course    Pt had presented with confusion and paranoid thoughts, which was unlike her. During this hospitalization, she had thorough workup including CT head, UA, TSH, vitamin B12, and folate which were all normal. Her home medication Bethanechol was discontinued and her mental status improved to baseline. She also had Hgb of 6.7 on admission and received 1U PRBC transfusion. She had EGD done on 10/11 which showed multiple duodenal ulcers and gastritis, she was started on PPI. Son didn't want to proceed with colonoscopy. She received IV iron while in the hospital. She had pain in her L wrist and XR was negative for fracture. She also had ulcer on her heel and XR showed superficial ulcer. NATHALIE showed  normal arterial perfusion to both ankles. Pt is now at baseline mental status and she is medically stable to be discharged today. Do not resume her Bethanechol.     Exam on Day of Discharge  Physical Exam  Constitutional:       General: She is not in acute distress.  HENT:      Head: Normocephalic.      Nose: Nose normal.      Mouth/Throat:      Mouth: Mucous membranes are moist.   Eyes:      General: No scleral icterus.  Cardiovascular:      Heart sounds: Normal heart sounds.   Pulmonary:      Breath sounds: Normal breath sounds.   Abdominal:      General: There is no distension.      Palpations: Abdomen is soft.   Musculoskeletal:         General: No swelling.   Skin:     Coloration: Skin is not jaundiced.   Neurological:      Mental Status: She is alert. Mental status is at baseline.     Consults During Admission  IP CONSULT TO HEMATOLOGY/ONCOLOGY  IP CONSULT TO GASTROENTEROLOGY  IP CONSULT TO WOUND OSTOMY CONTINENCE  IP CONSULT TO PODIATRY    DISCHARGE MEDICATIONS               Medication List        START taking these medications      ammonium lactate 12 % cream  Commonly known as: AMLACTIN  Apply topically 2 (two) times a day.     ferrous sulfate 325 mg (65 mg iron) EC tablet  Take 1 tablet (325 mg total) by mouth daily with breakfast.  Dose: 325 mg     nystatin 100,000 unit/gram powder  Commonly known as: MYCOSTATIN  Apply topically 2 (two) times a day.     pantoprazole 40 mg EC tablet  Commonly known as: PROTONIX  Take 1 tablet (40 mg total) by mouth daily.  Dose: 40 mg            CONTINUE taking these medications      fluticasone propionate 50 mcg/actuation nasal spray  Commonly known as: FLONASE  Administer 1 spray into each nostril daily.  Dose: 1 spray     levothyroxine 88 mcg tablet  Commonly known as: SYNTHROID  Take 88 mcg by mouth daily.  Dose: 88 mcg     pravastatin 20 mg tablet  Commonly known as: PRAVACHOL  Take 20 mg by mouth nightly.  Dose: 20 mg     sennosides-docusate sodium 8.6-50  mg  Commonly known as: SENOKOT-S  Take 2 tablets by mouth daily as needed for constipation.  Dose: 2 tablet     traMADoL 50 mg tablet  Commonly known as: ULTRAM  Take 25 mg by mouth every 8 (eight) hours as needed for pain.  Dose: 25 mg            STOP taking these medications      bethanechol 10 mg tablet  Commonly known as: URECHOLINE     docusate sodium 100 mg capsule  Commonly known as: COLACE               Instructions for after discharge       Activity:  As Tolerated      Admission H&P Valid:  Yes      Discharge Condtion:  Stabilized      Discharge Potential:  Length of Stay < 30 Days      Discharge Summary:  Enclosed      Discharge diet      Casey Orange AM snack, Ensure Plus High Protein Vanilla lunch    Diet Type / Texture: Soft and Bite Sized SB6    Follow Up:  With Primary MD within 1 week discharge from facility      Free of Communicable Disease:   Yes      Level of Care:  Skilled      Patient Aware of Diagnosis: Yes      Rehab Potential:  Good      Treatment Options: Full Resuscitation      Vital Signs Per Facility Standard      Weights Per Facility Standard                 PROCEDURES / LABS / IMAGING      Operative Procedures  - EGD 10/11    Pertinent Labs  Lab Results   Component Value Date    WBC 8.85 10/15/2024    HGB 8.9 (L) 10/15/2024    HCT 28.3 (L) 10/15/2024    MCV 89.0 10/15/2024     10/15/2024     Lab Results   Component Value Date    GLUCOSE 95 10/15/2024    CALCIUM 8.0 (L) 10/15/2024     (L) 10/15/2024    K 4.3 10/15/2024    CO2 25 10/15/2024     10/15/2024    BUN 25 10/15/2024    CREATININE 0.6 10/15/2024           Pertinent Imaging  X-RAY FOOT LEFT 3+ VIEWS    Result Date: 10/11/2024  IMPRESSION: Superficial plantar heel wound.  No radiographic evidence for osteomyelitis.     CT ABDOMEN PELVIS WITH IV CONTRAST    Result Date: 10/9/2024  IMPRESSION: 1. No acute inflammatory process in the abdomen or pelvis. 2. Small bilateral pleural effusions with associated atelectasis  3. Moderate sized hiatal hernia     Ultrasound venous leg bilateral    Result Date: 10/9/2024  IMPRESSION: No deep venous thrombosis in the lower extremities. Subcutaneous edema in the calves.    X-RAY WRIST LEFT 2 VIEWS    Result Date: 10/9/2024  IMPRESSION: See comment.    CT HEAD WITHOUT IV CONTRAST    Result Date: 10/8/2024  IMPRESSION: 1. There is no acute intracranial abnormality.  No intracranial hemorrhage or other evidence for traumatic injury. 2. Moderate microvascular ischemic white matter changes are present with moderate global cerebral volume loss.     OUTPATIENT  FOLLOW-UP / REFERRALS / PENDING TESTS        Outpatient Follow-Up Appointments  Encounter Information    This patient does not currently have any appointments scheduled.         Referrals  No orders of the defined types were placed in this encounter.      Test Results Pending at Discharge      Important Issues to Address in Follow-Up  - Follow up with PCP    DISCHARGE DISPOSITION AND DESTINATION      Disposition: Skilled Nursing Facility - Other   Destination: Skilled Nursing Facility                            Code Status At Discharge: Full Code    Physician Order for Life-Sustaining Treatment Document Status        No documents found                     >30 mins spent for coordination/counselling related to discharge plan, including final examination of patient, discussion regarding discharge instructions, reconciliation/prescription of medications, preparation of discharge records, etc.

## 2024-10-29 PROCEDURE — 99308 SBSQ NF CARE LOW MDM 20: CPT | Performed by: REGISTERED NURSE

## 2024-11-14 PROCEDURE — 99309 SBSQ NF CARE MODERATE MDM 30: CPT | Performed by: REGISTERED NURSE

## 2024-12-19 PROCEDURE — 99308 SBSQ NF CARE LOW MDM 20: CPT | Performed by: REGISTERED NURSE

## 2025-01-10 PROCEDURE — 99306 1ST NF CARE HIGH MDM 50: CPT | Performed by: INTERNAL MEDICINE

## 2025-01-13 PROCEDURE — 99309 SBSQ NF CARE MODERATE MDM 30: CPT | Performed by: INTERNAL MEDICINE

## 2025-01-16 PROCEDURE — 99347 HOME/RES VST EST SF MDM 20: CPT | Performed by: REGISTERED NURSE

## 2025-02-25 PROCEDURE — 99308 SBSQ NF CARE LOW MDM 20: CPT | Performed by: REGISTERED NURSE

## 2025-03-26 PROCEDURE — 99309 SBSQ NF CARE MODERATE MDM 30: CPT | Performed by: INTERNAL MEDICINE

## 2025-04-29 PROCEDURE — 99308 SBSQ NF CARE LOW MDM 20: CPT | Performed by: REGISTERED NURSE

## 2025-05-21 PROCEDURE — 99308 SBSQ NF CARE LOW MDM 20: CPT | Performed by: INTERNAL MEDICINE

## 2025-06-26 PROCEDURE — 99308 SBSQ NF CARE LOW MDM 20: CPT | Performed by: REGISTERED NURSE

## 2025-07-22 PROCEDURE — 99308 SBSQ NF CARE LOW MDM 20: CPT | Performed by: REGISTERED NURSE

## 2025-08-26 PROCEDURE — 99308 SBSQ NF CARE LOW MDM 20: CPT | Performed by: REGISTERED NURSE

## (undated) DEVICE — MOUTHPIECE 60FR ENDO BITEBLOCK

## (undated) DEVICE — GOWN SIRUS POLYRNF BRTHSLV LG 30/CS

## (undated) DEVICE — CUSTOM PROCEDURE KIT